# Patient Record
Sex: FEMALE | Race: WHITE | Employment: FULL TIME | URBAN - METROPOLITAN AREA
[De-identification: names, ages, dates, MRNs, and addresses within clinical notes are randomized per-mention and may not be internally consistent; named-entity substitution may affect disease eponyms.]

---

## 2018-04-10 ENCOUNTER — HOSPITAL ENCOUNTER (EMERGENCY)
Age: 43
Discharge: HOME | End: 2018-04-10
Attending: EMERGENCY MEDICINE
Payer: COMMERCIAL

## 2018-04-10 VITALS
DIASTOLIC BLOOD PRESSURE: 84 MMHG | BODY MASS INDEX: 34.46 KG/M2 | WEIGHT: 220 LBS | OXYGEN SATURATION: 99 % | TEMPERATURE: 98.3 F | HEART RATE: 106 BPM | SYSTOLIC BLOOD PRESSURE: 120 MMHG | RESPIRATION RATE: 16 BRPM

## 2018-04-10 DIAGNOSIS — L03.211 CELLULITIS OF FACE: Primary | ICD-10-CM

## 2018-04-10 DIAGNOSIS — H00.012 HORDEOLUM EXTERNUM OF RIGHT LOWER EYELID: ICD-10-CM

## 2018-04-10 PROCEDURE — 99282 EMERGENCY DEPT VISIT SF MDM: CPT

## 2018-04-10 RX ORDER — SULFAMETHOXAZOLE AND TRIMETHOPRIM 800; 160 MG/1; MG/1
2 TABLET ORAL 2 TIMES DAILY
Qty: 40 TABLET | Refills: 0 | Status: SHIPPED | OUTPATIENT
Start: 2018-04-10 | End: 2018-04-20

## 2018-04-10 RX ORDER — CEPHALEXIN 500 MG/1
500 CAPSULE ORAL 4 TIMES DAILY
Qty: 40 CAPSULE | Refills: 0 | Status: SHIPPED | OUTPATIENT
Start: 2018-04-10 | End: 2018-04-20

## 2018-04-10 RX ORDER — GENTAMICIN SULFATE 3 MG/ML
1 SOLUTION/ DROPS OPHTHALMIC EVERY 4 HOURS
Qty: 1 BOTTLE | Refills: 0 | Status: SHIPPED | OUTPATIENT
Start: 2018-04-10 | End: 2018-04-17

## 2018-04-10 ASSESSMENT — PAIN SCALES - GENERAL: PAINLEVEL_OUTOF10: 4

## 2018-04-10 ASSESSMENT — PAIN DESCRIPTION - FREQUENCY: FREQUENCY: CONTINUOUS

## 2018-04-10 ASSESSMENT — PAIN DESCRIPTION - DESCRIPTORS: DESCRIPTORS: SORE

## 2018-04-10 ASSESSMENT — PAIN DESCRIPTION - PAIN TYPE: TYPE: ACUTE PAIN

## 2018-04-10 ASSESSMENT — PAIN DESCRIPTION - PROGRESSION
CLINICAL_PROGRESSION: NOT CHANGED
CLINICAL_PROGRESSION: NOT CHANGED

## 2018-04-12 ENCOUNTER — HOSPITAL ENCOUNTER (EMERGENCY)
Age: 43
Discharge: HOME | End: 2018-04-12
Attending: EMERGENCY MEDICINE
Payer: COMMERCIAL

## 2018-04-12 VITALS
SYSTOLIC BLOOD PRESSURE: 159 MMHG | HEART RATE: 104 BPM | TEMPERATURE: 98.4 F | BODY MASS INDEX: 33.34 KG/M2 | RESPIRATION RATE: 16 BRPM | OXYGEN SATURATION: 98 % | WEIGHT: 220 LBS | HEIGHT: 68 IN | DIASTOLIC BLOOD PRESSURE: 91 MMHG

## 2018-04-12 DIAGNOSIS — L30.9 DERMATITIS: Primary | ICD-10-CM

## 2018-04-12 LAB
ANION GAP SERPL CALCULATED.3IONS-SCNC: 14 MMOL/L (ref 7–16)
BASOPHILS ABSOLUTE: 0.05 E9/L (ref 0–0.2)
BASOPHILS RELATIVE PERCENT: 0.5 % (ref 0–2)
BUN BLDV-MCNC: 14 MG/DL (ref 6–20)
CALCIUM SERPL-MCNC: 9.6 MG/DL (ref 8.6–10.2)
CHLORIDE BLD-SCNC: 100 MMOL/L (ref 98–107)
CO2: 27 MMOL/L (ref 22–29)
CREAT SERPL-MCNC: 0.9 MG/DL (ref 0.5–1)
EOSINOPHILS ABSOLUTE: 0.08 E9/L (ref 0.05–0.5)
EOSINOPHILS RELATIVE PERCENT: 0.8 % (ref 0–6)
GFR AFRICAN AMERICAN: >60
GFR NON-AFRICAN AMERICAN: >60 ML/MIN/1.73
GLUCOSE BLD-MCNC: 176 MG/DL (ref 74–109)
HCT VFR BLD CALC: 43.1 % (ref 34–48)
HEMOGLOBIN: 14.2 G/DL (ref 11.5–15.5)
IMMATURE GRANULOCYTES #: 0.05 E9/L
IMMATURE GRANULOCYTES %: 0.5 % (ref 0–5)
LYMPHOCYTES ABSOLUTE: 3.03 E9/L (ref 1.5–4)
LYMPHOCYTES RELATIVE PERCENT: 29.9 % (ref 20–42)
MCH RBC QN AUTO: 28.1 PG (ref 26–35)
MCHC RBC AUTO-ENTMCNC: 32.9 % (ref 32–34.5)
MCV RBC AUTO: 85.3 FL (ref 80–99.9)
MONOCYTES ABSOLUTE: 0.5 E9/L (ref 0.1–0.95)
MONOCYTES RELATIVE PERCENT: 4.9 % (ref 2–12)
NEUTROPHILS ABSOLUTE: 6.44 E9/L (ref 1.8–7.3)
NEUTROPHILS RELATIVE PERCENT: 63.4 % (ref 43–80)
PDW BLD-RTO: 13.8 FL (ref 11.5–15)
PLATELET # BLD: 314 E9/L (ref 130–450)
PMV BLD AUTO: 10.2 FL (ref 7–12)
POTASSIUM SERPL-SCNC: 4.6 MMOL/L (ref 3.5–5)
RBC # BLD: 5.05 E12/L (ref 3.5–5.5)
SODIUM BLD-SCNC: 141 MMOL/L (ref 132–146)
WBC # BLD: 10.2 E9/L (ref 4.5–11.5)

## 2018-04-12 PROCEDURE — 36415 COLL VENOUS BLD VENIPUNCTURE: CPT

## 2018-04-12 PROCEDURE — 85025 COMPLETE CBC W/AUTO DIFF WBC: CPT

## 2018-04-12 PROCEDURE — 80048 BASIC METABOLIC PNL TOTAL CA: CPT

## 2018-04-12 PROCEDURE — 99283 EMERGENCY DEPT VISIT LOW MDM: CPT

## 2018-04-12 ASSESSMENT — PAIN DESCRIPTION - FREQUENCY: FREQUENCY: CONTINUOUS

## 2018-04-12 ASSESSMENT — PAIN DESCRIPTION - DESCRIPTORS: DESCRIPTORS: BURNING

## 2018-04-12 ASSESSMENT — PAIN DESCRIPTION - ONSET: ONSET: ON-GOING

## 2018-04-12 ASSESSMENT — PAIN DESCRIPTION - PAIN TYPE: TYPE: ACUTE PAIN

## 2018-04-12 ASSESSMENT — PAIN SCALES - GENERAL: PAINLEVEL_OUTOF10: 7

## 2018-04-12 ASSESSMENT — PAIN DESCRIPTION - ORIENTATION: ORIENTATION: RIGHT

## 2018-04-12 ASSESSMENT — PAIN DESCRIPTION - LOCATION: LOCATION: EYE;FACE

## 2018-05-07 ENCOUNTER — HOSPITAL ENCOUNTER (OUTPATIENT)
Dept: GENERAL RADIOLOGY | Age: 43
Discharge: HOME | End: 2018-05-09
Payer: COMMERCIAL

## 2018-05-07 DIAGNOSIS — Z85.3 PERSONAL HISTORY OF MALIGNANT NEOPLASM OF BREAST: ICD-10-CM

## 2018-05-07 PROCEDURE — 77066 DX MAMMO INCL CAD BI: CPT

## 2018-08-30 ENCOUNTER — HOSPITAL ENCOUNTER (OUTPATIENT)
Dept: RADIATION ONCOLOGY | Age: 43
Discharge: HOME | End: 2018-08-30
Payer: COMMERCIAL

## 2018-08-30 VITALS
BODY MASS INDEX: 35.61 KG/M2 | SYSTOLIC BLOOD PRESSURE: 112 MMHG | HEART RATE: 88 BPM | RESPIRATION RATE: 18 BRPM | DIASTOLIC BLOOD PRESSURE: 76 MMHG | WEIGHT: 234.2 LBS | TEMPERATURE: 98.1 F

## 2018-08-30 DIAGNOSIS — C50.511 MALIGNANT NEOPLASM OF LOWER-OUTER QUADRANT OF RIGHT FEMALE BREAST, UNSPECIFIED ESTROGEN RECEPTOR STATUS (HCC): Primary | ICD-10-CM

## 2018-08-30 PROCEDURE — 99212 OFFICE O/P EST SF 10 MIN: CPT

## 2018-08-30 PROCEDURE — 99213 OFFICE O/P EST LOW 20 MIN: CPT | Performed by: NURSE PRACTITIONER

## 2018-08-30 RX ORDER — ERTUGLIFLOZIN 5 MG/1
5 TABLET, FILM COATED ORAL DAILY
COMMUNITY
Start: 2018-08-24 | End: 2022-05-12

## 2018-08-30 NOTE — PROGRESS NOTES
Radiation Oncology   Follow Up Note        8/30/2018    Itz Morel  Vitals:    08/30/18 1004   BP: 112/76   Pulse: 88   Resp: 18   Temp: 98.1 °F (36.7 °C)     Wt Readings from Last 1 Encounters:   08/30/18 234 lb 3.2 oz (106.2 kg)         Gianluca Orantes MD,      CC: Patient is here for follow up for post-radiation completion. HPI:  Itz Morel is a pleasant 38 y.o. female with a diagnosis of invasive adenocarcinoma right breast, status post conservative surgery, chemotherapy, and adjuvant RT. Stage TII Na M0 (2B) ER positive [place] negative HER-2 negative. The patient underwent a course of radiation therapy to the right supraclavicular fossa, the right axilla and also the right breast, which was completed on 9/15/16. She completed 6040 cGy in 33 fractions. States that she is doing well. Notices improved fatigue. Sometimes she notes swelling to her right upper chest wall to which she applies ice and the swelling goes down. Denies redness, pain, or warmth. This happens after using her arms a lot as well. Denies any new lumps, bumps or discharge from the nipple. States that she does do monthly self breast exams. Patient is following with Dr Sahil Carrasco for medical oncology. Last seen in office in April 2018. Tolerating Tamoxifen well but notes increased weight gain. She has tried dieting and exercise with no results. Last mammogram on 5/7/18, see results below. Next appt tomorrow. Plans to discuss weight gain with Dr Sahil Carrasco at this time as well.     Past Medical History:   Diagnosis Date    Breast cancer ([address]     Cancer ([facility]     Breast    Diabetes mellitus ([facility]     Headache(784.0)     migraines    Hyperlipidemia     Hypertension     Lumbar herniated disc 2012    x3    Migraine          Past Surgical History:   Procedure Laterality Date    ANKLE SURGERY      BREAST BIOPSY      BREAST LUMPECTOMY      CHOLECYSTECTOMY      HYSTERECTOMY      LEEP      4 PROCEDURES    TIBIA FRACTURE SURGERY SYRINGE 0.5CC/31GX5/16 31G X 5/16\" 0.5 ML MISC   0    glucose blood VI test strips (ONE TOUCH ULTRA TEST) strip Check blood sugar 4 times a day 150 each 12    Blood Glucose Monitoring Suppl (ONE TOUCH ULTRA MINI) W/DEVICE KIT Check blood sugar bid 1 kit 0    ONE TOUCH LANCETS MISC Check blood sugar bid 100 each 12    naproxen (NAPROSYN) 500 MG tablet Take 1 tablet by mouth 2 times daily for 7 days 14 tablet 0     No current facility-administered medications for this encounter. REVIEW OF SYSTEMS:      Constitutional:  No fever chills or rigors. Eyes: No changes in vision, discharge, or pain  ENT: No Headaches, hearing loss or vertigo. No mouth sores or sore throat. No change in taste or smell. Cardiovascular: No chest discomfort, dyspnea on exertion, palpitations or loss of consciousness or phlebitis. Respiratory: Has no cough or wheezing, Has no sputum production. Has no hemoptysis, has no pleuritic pain. Gastrointestinal: No abdominal pain, appetite loss, blood in stools. No change in bowel habits. No hematemesis   Genitourinary: Patient acknowledges no dysuria, trouble voiding, or hematuria. No nocturia or increased frequency. Musculoskeletal: No gait disturbance, weakness or joint complaints. Integumentary: No rash or pruritis. Neurological: No headache, diplopia, change in muscle strength, numbness or tingling. No change in gait, balance, coordination, mood, affect, memory, mentation, behavior. Psychiatric: No anxiety, or depression. Endocrine: No temperature intolerance. No excessive thirst, fluid intake, or urination. No tremor. Hematologic/Lymphatic: No abnormal bruising or bleeding, blood clots or swollen lymph nodes. Allergic/Immunologic: No nasal congestion or hives.             PHYSICAL EXAMINATION:   Vitals:    08/30/18 1004   BP: 112/76   Pulse: 88   Resp: 18   Temp: 98.1 °F (36.7 °C)   TempSrc: Oral   Weight: 234 lb 3.2 oz (106.2 kg)     Constitutional: A well developed, well nourished 38 y.o. female who is alert, oriented, cooperative and in no apparent distress. Head was normocephalic and atraumatic. EENT: EOMI REJI. MMM. No icterus. External canals are patent and no discharge was appreciated. Septum was midline, mucosa was without erythema, exudates or cobblestoning. No thrush was noted. Neck: Supple without thyromegaly. Trachea was midline. No carotid bruits. No stridor or subglottic wheeze. Respiratory: Chest expansion was symmetrical. Breath sounds bilaterally were clear to auscultation. No wheezes, rhonchi or rales. No intercostal retraction or use of accessory muscles. Cardiovascular: Regular without murmur, clicks, gallops or rubs. Abdomen: Obese, rounded and soft without organomegaly. No rebound, guarding or  rigidity. Lymphatic: No lymphadenopathy. Musculoskeletal: Ambulates without assistance. Normal curvature of the spine. No gross muscle weakness. Muscle size, tone and strength are normal. No involuntary movements. Extremities:  No lower extremity edema, ulcerations, tenderness, varicosities or erythema. Coordination appears adequate. Sensory function appears intact. Skin:  Warm and dry. Examination of color, turgor and pigmentation was relatively normal. No bruises or skin rashes. Old surgical scars are noted. Neurological/Psychiatric: General behavior, level of consciousness, thought content is normal. The patient's emotional status is normal.  Cranial nerves II-XII are grossly intact. Breasts: Left breast normal without mass, skin or nipple changes or axillary nodes. Surgical scars noted to right breast with scar tissue palpable. Otherwise right breast with no mass, skin or nipple changes or axillary nodes.         IMAGING:    MAMMOGRAM Bilateral, 5/7/18:  Narrative   TISSUE DENSITY:   There are scattered fibroglandular densities (Type 2 density).       MAMMOGRAM FINDINGS:   Finding 1:   There is a post-surgical scar seen in the

## 2018-09-11 ENCOUNTER — TELEPHONE (OUTPATIENT)
Dept: RADIATION ONCOLOGY | Age: 43
End: 2018-09-11

## 2018-09-11 NOTE — TELEPHONE ENCOUNTER
Attempted to contact patient per referral, questions about weight loss. Patient unavailable, voicemail left, encouraged return call as able.  Sky Jerez RD,,LD

## 2018-09-12 ENCOUNTER — HOSPITAL ENCOUNTER (EMERGENCY)
Age: 43
Discharge: HOME | End: 2018-09-12
Attending: EMERGENCY MEDICINE
Payer: COMMERCIAL

## 2018-09-12 VITALS
TEMPERATURE: 98.4 F | HEART RATE: 94 BPM | DIASTOLIC BLOOD PRESSURE: 80 MMHG | SYSTOLIC BLOOD PRESSURE: 123 MMHG | RESPIRATION RATE: 16 BRPM | HEIGHT: 68 IN | WEIGHT: 243 LBS | OXYGEN SATURATION: 100 % | BODY MASS INDEX: 36.83 KG/M2

## 2018-09-12 DIAGNOSIS — J06.9 ACUTE UPPER RESPIRATORY INFECTION: Primary | ICD-10-CM

## 2018-09-12 DIAGNOSIS — R05.9 COUGH: ICD-10-CM

## 2018-09-12 DIAGNOSIS — S29.019A THORACIC MYOFASCIAL STRAIN, INITIAL ENCOUNTER: ICD-10-CM

## 2018-09-12 PROCEDURE — 99282 EMERGENCY DEPT VISIT SF MDM: CPT

## 2018-09-12 PROCEDURE — G0381 LEV 2 HOSP TYPE B ED VISIT: HCPCS

## 2018-09-12 RX ORDER — IBUPROFEN 800 MG/1
800 TABLET ORAL EVERY 8 HOURS PRN
Qty: 21 TABLET | Refills: 0 | Status: SHIPPED | OUTPATIENT
Start: 2018-09-12 | End: 2018-10-18 | Stop reason: ALTCHOICE

## 2018-09-12 RX ORDER — BROMPHENIRAMINE MALEATE, PSEUDOEPHEDRINE HYDROCHLORIDE, AND DEXTROMETHORPHAN HYDROBROMIDE 2; 30; 10 MG/5ML; MG/5ML; MG/5ML
5 SYRUP ORAL 4 TIMES DAILY PRN
Qty: 120 ML | Refills: 1 | Status: SHIPPED | OUTPATIENT
Start: 2018-09-12 | End: 2018-09-22

## 2018-09-12 ASSESSMENT — PAIN DESCRIPTION - DESCRIPTORS: DESCRIPTORS: RADIATING

## 2018-09-12 ASSESSMENT — PAIN DESCRIPTION - ORIENTATION: ORIENTATION: LOWER

## 2018-09-12 ASSESSMENT — PAIN SCALES - GENERAL: PAINLEVEL_OUTOF10: 6

## 2018-09-12 ASSESSMENT — PAIN DESCRIPTION - PAIN TYPE: TYPE: ACUTE PAIN

## 2018-09-12 ASSESSMENT — ENCOUNTER SYMPTOMS
GASTROINTESTINAL NEGATIVE: 1
RHINORRHEA: 1
COUGH: 1
BACK PAIN: 1
EYES NEGATIVE: 1

## 2018-09-12 ASSESSMENT — PAIN DESCRIPTION - LOCATION: LOCATION: BACK

## 2018-09-12 NOTE — ED PROVIDER NOTES
Cough, cold symptoms, threw mid back out coughing so hard            Review of Systems   Constitutional: Positive for activity change. HENT: Positive for congestion and rhinorrhea. Eyes: Negative. Respiratory: Positive for cough. Cardiovascular: Negative. Gastrointestinal: Negative. Endocrine: Negative. Genitourinary: Negative. Musculoskeletal: Positive for back pain. Skin: Negative. Neurological: Negative. Hematological: Negative. Psychiatric/Behavioral: Negative. All other systems reviewed and are negative. Physical Exam   Constitutional: She is oriented to person, place, and time. She appears well-developed and well-nourished. HENT:   Head: Normocephalic. Right Ear: A middle ear effusion is present. Left Ear: A middle ear effusion is present. Nose: Mucosal edema and rhinorrhea present. Eyes: Pupils are equal, round, and reactive to light. Neck: Normal range of motion. Cardiovascular: Normal rate and regular rhythm. Pulmonary/Chest: Effort normal.   Abdominal: Soft. Musculoskeletal: Normal range of motion. Neurological: She is alert and oriented to person, place, and time. Skin: Skin is warm. Nursing note and vitals reviewed. --------------------------------------------- PAST HISTORY ---------------------------------------------  Past Medical History:  has a past medical history of Breast cancer ([facility] Diabetes mellitus ([facility] Headache(784.0); Hyperlipidemia; Hypertension; Lumbar herniated disc; and Migraine. Past Surgical History:  has a past surgical history that includes Cholecystectomy; Tubal ligation; LEEP; Tonsillectomy; Tibia fracture surgery; Ankle surgery; Breast biopsy; Breast lumpectomy; and Hysterectomy. Social History:  reports that she quit smoking about 3 years ago. She smoked 0.00 packs per day for 0.00 years.  She has never used smokeless tobacco. She reports that she does not drink alcohol or use hours as needed for Pain   Previous Medications    B-D INS SYRINGE 0.5CC/31GX5/16 31G X 5/16\" 0.5 ML MISC        BLOOD GLUCOSE MONITORING SUPPL (FREESTYLE LITE) BIRDIE        BLOOD GLUCOSE MONITORING SUPPL (ONE TOUCH ULTRA MINI) W/DEVICE KIT    Check blood sugar bid    GLUCOSE BLOOD VI TEST STRIPS (ONE TOUCH ULTRA TEST) STRIP    Check blood sugar 4 times a day    INSULIN GLARGINE (BASAGLAR KWIKPEN) 100 UNIT/ML INJECTION PEN    Inject 60 Units into the skin nightly    INSULIN PEN NEEDLE 32G X 6 MM MISC    1 Device by Does not apply route daily    INSULIN SYRINGE .5CC/29G (AIMSCO INSULIN SYR ULTRA THIN) 29G X 1/2\" 0.5 ML MISC    Use as directed    METFORMIN (GLUCOPHAGE) 500 MG TABLET    Take 1 tablet by mouth 2 times daily (with meals)    NOVOLOG 100 UNIT/ML INJECTION VIAL    inject 10 units subcutaneously three times a day if needed    OMEPRAZOLE (PRILOSEC) 20 MG DELAYED RELEASE CAPSULE    Take 20 mg by mouth daily    ONE TOUCH LANCETS MISC    Check blood sugar bid    STEGLATRO 5 MG TABS    Take 10 mg by mouth daily    TAMOXIFEN (NOLVADEX) 10 MG TABLET    Take 20 mg by mouth daily   Modified Medications    No medications on file   Discontinued Medications    NAPROXEN (NAPROSYN) 500 MG TABLET    Take 1 tablet by mouth 2 times daily for 7 days         Procedures    BISI Mcdonough DO  09/12/18 3098

## 2018-09-25 ENCOUNTER — TELEPHONE (OUTPATIENT)
Dept: RADIATION ONCOLOGY | Age: 43
End: 2018-09-25

## 2018-10-18 ENCOUNTER — HOSPITAL ENCOUNTER (EMERGENCY)
Age: 43
Discharge: HOME | End: 2018-10-18
Attending: EMERGENCY MEDICINE
Payer: COMMERCIAL

## 2018-10-18 ENCOUNTER — APPOINTMENT (OUTPATIENT)
Dept: CT IMAGING | Age: 43
End: 2018-10-18
Payer: COMMERCIAL

## 2018-10-18 VITALS
TEMPERATURE: 98.4 F | DIASTOLIC BLOOD PRESSURE: 76 MMHG | RESPIRATION RATE: 14 BRPM | SYSTOLIC BLOOD PRESSURE: 143 MMHG | OXYGEN SATURATION: 98 % | HEIGHT: 68 IN | HEART RATE: 84 BPM | BODY MASS INDEX: 30.31 KG/M2 | WEIGHT: 200 LBS

## 2018-10-18 DIAGNOSIS — R59.1 LYMPHADENOPATHY: ICD-10-CM

## 2018-10-18 DIAGNOSIS — R07.9 CHEST PAIN, UNSPECIFIED TYPE: Primary | ICD-10-CM

## 2018-10-18 LAB
ANION GAP SERPL CALCULATED.3IONS-SCNC: 14 MMOL/L (ref 7–16)
BUN BLDV-MCNC: 14 MG/DL (ref 6–20)
CALCIUM SERPL-MCNC: 9.1 MG/DL (ref 8.6–10.2)
CHLORIDE BLD-SCNC: 100 MMOL/L (ref 98–107)
CHP ED QC CHECK: YES
CO2: 23 MMOL/L (ref 22–29)
CREAT SERPL-MCNC: 0.6 MG/DL (ref 0.5–1)
EKG ATRIAL RATE: 79 BPM
EKG P AXIS: 47 DEGREES
EKG P-R INTERVAL: 140 MS
EKG Q-T INTERVAL: 378 MS
EKG QRS DURATION: 90 MS
EKG QTC CALCULATION (BAZETT): 433 MS
EKG R AXIS: 47 DEGREES
EKG T AXIS: 32 DEGREES
EKG VENTRICULAR RATE: 79 BPM
GFR AFRICAN AMERICAN: >60
GFR NON-AFRICAN AMERICAN: >60 ML/MIN/1.73
GLUCOSE BLD-MCNC: 177 MG/DL (ref 74–109)
GLUCOSE BLD-MCNC: 182 MG/DL
HCT VFR BLD CALC: 43.2 % (ref 34–48)
HEMOGLOBIN: 14 G/DL (ref 11.5–15.5)
MCH RBC QN AUTO: 27.4 PG (ref 26–35)
MCHC RBC AUTO-ENTMCNC: 32.4 % (ref 32–34.5)
MCV RBC AUTO: 84.5 FL (ref 80–99.9)
PDW BLD-RTO: 13.2 FL (ref 11.5–15)
PLATELET # BLD: 289 E9/L (ref 130–450)
PMV BLD AUTO: 10.8 FL (ref 7–12)
POC ANION GAP: 19
POC BUN: 14
POC CHLORIDE: 102
POC CO2: 23
POC CREATININE: 0.6
POC POTASSIUM: 4
POC SODIUM: 139
POTASSIUM SERPL-SCNC: 4.1 MMOL/L (ref 3.5–5)
RBC # BLD: 5.11 E12/L (ref 3.5–5.5)
SODIUM BLD-SCNC: 137 MMOL/L (ref 132–146)
TROPONIN: <0.01 NG/ML (ref 0–0.03)
WBC # BLD: 9.4 E9/L (ref 4.5–11.5)

## 2018-10-18 PROCEDURE — 85027 COMPLETE CBC AUTOMATED: CPT

## 2018-10-18 PROCEDURE — 84484 ASSAY OF TROPONIN QUANT: CPT

## 2018-10-18 PROCEDURE — 99285 EMERGENCY DEPT VISIT HI MDM: CPT

## 2018-10-18 PROCEDURE — 93005 ELECTROCARDIOGRAM TRACING: CPT

## 2018-10-18 PROCEDURE — 36415 COLL VENOUS BLD VENIPUNCTURE: CPT

## 2018-10-18 PROCEDURE — 80048 BASIC METABOLIC PNL TOTAL CA: CPT

## 2018-10-18 PROCEDURE — 6360000004 HC RX CONTRAST MEDICATION: Performed by: RADIOLOGY

## 2018-10-18 PROCEDURE — 71275 CT ANGIOGRAPHY CHEST: CPT

## 2018-10-18 PROCEDURE — 6370000000 HC RX 637 (ALT 250 FOR IP): Performed by: EMERGENCY MEDICINE

## 2018-10-18 RX ORDER — ATORVASTATIN CALCIUM 20 MG/1
20 TABLET, FILM COATED ORAL DAILY
COMMUNITY
End: 2020-11-16

## 2018-10-18 RX ORDER — TAMOXIFEN CITRATE 20 MG/1
20 TABLET ORAL NIGHTLY
COMMUNITY

## 2018-10-18 RX ORDER — ASPIRIN 81 MG/1
324 TABLET, CHEWABLE ORAL ONCE
Status: COMPLETED | OUTPATIENT
Start: 2018-10-18 | End: 2018-10-18

## 2018-10-18 RX ADMIN — IOPAMIDOL 80 ML: 755 INJECTION, SOLUTION INTRAVENOUS at 11:01

## 2018-10-18 RX ADMIN — ASPIRIN 81 MG CHEWABLE TABLET 324 MG: 81 TABLET CHEWABLE at 10:06

## 2018-10-18 ASSESSMENT — PAIN DESCRIPTION - PAIN TYPE: TYPE: ACUTE PAIN

## 2018-10-18 ASSESSMENT — PAIN SCALES - GENERAL: PAINLEVEL_OUTOF10: 6

## 2018-10-18 ASSESSMENT — PAIN DESCRIPTION - LOCATION: LOCATION: CHEST

## 2018-10-18 ASSESSMENT — PAIN DESCRIPTION - FREQUENCY: FREQUENCY: INTERMITTENT

## 2018-10-18 ASSESSMENT — ENCOUNTER SYMPTOMS
ABDOMINAL PAIN: 0
VOMITING: 0
SHORTNESS OF BREATH: 1
NAUSEA: 0

## 2018-10-18 ASSESSMENT — PAIN DESCRIPTION - DESCRIPTORS: DESCRIPTORS: STABBING

## 2018-10-18 ASSESSMENT — PAIN DESCRIPTION - ORIENTATION: ORIENTATION: LEFT

## 2018-10-18 NOTE — ED NOTES
Pt clothes changed and gown placed  Cardiac monitoring and continuous SpO2       Inessa Dupont  10/18/18 [id number]

## 2018-10-22 ENCOUNTER — HOSPITAL ENCOUNTER (OUTPATIENT)
Dept: NUCLEAR MEDICINE | Age: 43
Discharge: HOME | End: 2018-10-24
Payer: COMMERCIAL

## 2018-10-22 ENCOUNTER — HOSPITAL ENCOUNTER (OUTPATIENT)
Dept: PULMONOLOGY | Age: 43
Discharge: HOME | End: 2018-10-22
Payer: COMMERCIAL

## 2018-10-22 DIAGNOSIS — R06.02 BREATH SHORTNESS: ICD-10-CM

## 2018-10-22 PROCEDURE — 3430000000 HC RX DIAGNOSTIC RADIOPHARMACEUTICAL: Performed by: RADIOLOGY

## 2018-10-22 PROCEDURE — A9540 TC99M MAA: HCPCS | Performed by: RADIOLOGY

## 2018-10-22 PROCEDURE — 78582 LUNG VENTILAT&PERFUS IMAGING: CPT

## 2018-10-22 PROCEDURE — A9558 XE133 XENON 10MCI: HCPCS | Performed by: RADIOLOGY

## 2018-10-22 RX ORDER — XENON XE-133 10 MCI/1
26.5 GAS RESPIRATORY (INHALATION)
Status: COMPLETED | OUTPATIENT
Start: 2018-10-22 | End: 2018-10-22

## 2018-10-22 RX ADMIN — XENON XE-133 26.5 MILLICURIE: 10 GAS RESPIRATORY (INHALATION) at 14:55

## 2018-10-22 RX ADMIN — Medication 7.6 MILLICURIE: at 15:05

## 2019-01-07 ENCOUNTER — HOSPITAL ENCOUNTER (EMERGENCY)
Age: 44
Discharge: HOME | End: 2019-01-07
Attending: EMERGENCY MEDICINE
Payer: COMMERCIAL

## 2019-01-07 VITALS
OXYGEN SATURATION: 98 % | HEART RATE: 98 BPM | TEMPERATURE: 98.7 F | RESPIRATION RATE: 16 BRPM | BODY MASS INDEX: 34.53 KG/M2 | HEIGHT: 67 IN | WEIGHT: 220 LBS | DIASTOLIC BLOOD PRESSURE: 91 MMHG | SYSTOLIC BLOOD PRESSURE: 134 MMHG

## 2019-01-07 DIAGNOSIS — J01.90 ACUTE SINUSITIS, RECURRENCE NOT SPECIFIED, UNSPECIFIED LOCATION: Primary | ICD-10-CM

## 2019-01-07 PROCEDURE — G0381 LEV 2 HOSP TYPE B ED VISIT: HCPCS

## 2019-01-07 PROCEDURE — 99282 EMERGENCY DEPT VISIT SF MDM: CPT

## 2019-01-07 RX ORDER — BROMPHENIRAMINE MALEATE, PSEUDOEPHEDRINE HYDROCHLORIDE, AND DEXTROMETHORPHAN HYDROBROMIDE 2; 30; 10 MG/5ML; MG/5ML; MG/5ML
5 SYRUP ORAL 4 TIMES DAILY PRN
Qty: 120 ML | Refills: 0 | Status: SHIPPED | OUTPATIENT
Start: 2019-01-07 | End: 2019-03-08

## 2019-01-07 RX ORDER — AMOXICILLIN AND CLAVULANATE POTASSIUM 875; 125 MG/1; MG/1
1 TABLET, FILM COATED ORAL 2 TIMES DAILY
Qty: 20 TABLET | Refills: 0 | Status: SHIPPED | OUTPATIENT
Start: 2019-01-07 | End: 2019-01-17

## 2019-01-07 ASSESSMENT — ENCOUNTER SYMPTOMS
EYE PAIN: 0
ABDOMINAL DISTENTION: 0
SHORTNESS OF BREATH: 0
SORE THROAT: 0
BACK PAIN: 0
WHEEZING: 0
EYE DISCHARGE: 0
VOMITING: 0
RHINORRHEA: 1
SINUS PRESSURE: 0
NAUSEA: 0
EYE REDNESS: 0
COUGH: 1
DIARRHEA: 0

## 2019-01-16 ENCOUNTER — HOSPITAL ENCOUNTER (OUTPATIENT)
Dept: ULTRASOUND IMAGING | Age: 44
End: 2019-01-16
Payer: COMMERCIAL

## 2019-01-16 ENCOUNTER — HOSPITAL ENCOUNTER (OUTPATIENT)
Dept: MAMMOGRAPHY | Age: 44
Discharge: HOME | End: 2019-01-18
Payer: COMMERCIAL

## 2019-01-16 DIAGNOSIS — Z09 FOLLOW-UP EXAM, 3-6 MONTHS SINCE PREVIOUS EXAM: ICD-10-CM

## 2019-01-16 PROCEDURE — G0279 TOMOSYNTHESIS, MAMMO: HCPCS

## 2019-03-08 ENCOUNTER — HOSPITAL ENCOUNTER (EMERGENCY)
Age: 44
Discharge: HOME | End: 2019-03-08
Attending: EMERGENCY MEDICINE
Payer: COMMERCIAL

## 2019-03-08 VITALS
SYSTOLIC BLOOD PRESSURE: 129 MMHG | RESPIRATION RATE: 14 BRPM | DIASTOLIC BLOOD PRESSURE: 86 MMHG | BODY MASS INDEX: 31.39 KG/M2 | TEMPERATURE: 98.1 F | WEIGHT: 200 LBS | OXYGEN SATURATION: 98 % | HEART RATE: 104 BPM | HEIGHT: 67 IN

## 2019-03-08 DIAGNOSIS — J01.00 ACUTE MAXILLARY SINUSITIS, RECURRENCE NOT SPECIFIED: ICD-10-CM

## 2019-03-08 DIAGNOSIS — J02.9 ACUTE PHARYNGITIS, UNSPECIFIED ETIOLOGY: Primary | ICD-10-CM

## 2019-03-08 PROCEDURE — 99282 EMERGENCY DEPT VISIT SF MDM: CPT

## 2019-03-08 RX ORDER — AZITHROMYCIN 250 MG/1
TABLET, FILM COATED ORAL
Qty: 1 PACKET | Refills: 0 | Status: SHIPPED | OUTPATIENT
Start: 2019-03-08 | End: 2019-03-18

## 2019-03-08 RX ORDER — ECHINACEA PURPUREA EXTRACT 125 MG
1 TABLET ORAL PRN
Qty: 1 BOTTLE | Refills: 3 | Status: SHIPPED | OUTPATIENT
Start: 2019-03-08 | End: 2021-07-23

## 2019-03-08 ASSESSMENT — ENCOUNTER SYMPTOMS
ALLERGIC/IMMUNOLOGIC NEGATIVE: 1
RESPIRATORY NEGATIVE: 1
GASTROINTESTINAL NEGATIVE: 1
EYES NEGATIVE: 1
SORE THROAT: 1

## 2019-03-08 ASSESSMENT — PAIN SCALES - GENERAL: PAINLEVEL_OUTOF10: 8

## 2019-03-08 ASSESSMENT — PAIN DESCRIPTION - DESCRIPTORS: DESCRIPTORS: SORE

## 2019-03-08 ASSESSMENT — PAIN DESCRIPTION - LOCATION: LOCATION: THROAT

## 2019-05-28 ENCOUNTER — HOSPITAL ENCOUNTER (OUTPATIENT)
Dept: MAMMOGRAPHY | Age: 44
Discharge: HOME | End: 2019-05-30
Payer: COMMERCIAL

## 2019-05-28 ENCOUNTER — HOSPITAL ENCOUNTER (OUTPATIENT)
Dept: ULTRASOUND IMAGING | Age: 44
Discharge: HOME | End: 2019-05-28
Payer: COMMERCIAL

## 2019-05-28 DIAGNOSIS — R92.2 BREAST DENSITY: ICD-10-CM

## 2019-05-28 DIAGNOSIS — C50.511 MALIGNANT NEOPLASM OF LOWER-OUTER QUADRANT OF RIGHT FEMALE BREAST, UNSPECIFIED ESTROGEN RECEPTOR STATUS (HCC): ICD-10-CM

## 2019-05-28 PROCEDURE — 76642 ULTRASOUND BREAST LIMITED: CPT

## 2019-05-28 PROCEDURE — G0279 TOMOSYNTHESIS, MAMMO: HCPCS

## 2019-05-28 PROCEDURE — 77063 BREAST TOMOSYNTHESIS BI: CPT

## 2019-05-28 PROCEDURE — 77066 DX MAMMO INCL CAD BI: CPT

## 2019-08-23 ENCOUNTER — HOSPITAL ENCOUNTER (OUTPATIENT)
Age: 44
Discharge: HOME | End: 2019-08-23
Payer: COMMERCIAL

## 2019-08-23 ENCOUNTER — HOSPITAL ENCOUNTER (OUTPATIENT)
Dept: CT IMAGING | Age: 44
Discharge: HOME | End: 2019-08-23
Payer: COMMERCIAL

## 2019-08-23 DIAGNOSIS — C50.511 MALIGNANT NEOPLASM OF LOWER-OUTER QUADRANT OF RIGHT FEMALE BREAST, UNSPECIFIED ESTROGEN RECEPTOR STATUS (HCC): ICD-10-CM

## 2019-08-23 LAB
ALBUMIN SERPL-MCNC: 3.8 G/DL (ref 3.5–5.2)
ALP BLD-CCNC: 142 U/L (ref 35–104)
ALT SERPL-CCNC: 47 U/L (ref 0–32)
ANION GAP SERPL CALCULATED.3IONS-SCNC: 11 MMOL/L (ref 7–16)
AST SERPL-CCNC: 43 U/L (ref 0–31)
BASOPHILS ABSOLUTE: 0.05 E9/L (ref 0–0.2)
BASOPHILS RELATIVE PERCENT: 0.8 % (ref 0–2)
BILIRUB SERPL-MCNC: <0.2 MG/DL (ref 0–1.2)
BILIRUBIN URINE: NEGATIVE
BLOOD, URINE: NEGATIVE
BUN BLDV-MCNC: 16 MG/DL (ref 6–20)
CALCIUM SERPL-MCNC: 9 MG/DL (ref 8.6–10.2)
CHLORIDE BLD-SCNC: 103 MMOL/L (ref 98–107)
CHOLESTEROL, TOTAL: 197 MG/DL (ref 0–199)
CLARITY: CLEAR
CO2: 27 MMOL/L (ref 22–29)
COLOR: YELLOW
CREAT SERPL-MCNC: 0.7 MG/DL (ref 0.5–1)
CREATININE URINE: 64 MG/DL (ref 29–226)
EOSINOPHILS ABSOLUTE: 0.13 E9/L (ref 0.05–0.5)
EOSINOPHILS RELATIVE PERCENT: 2 % (ref 0–6)
GFR AFRICAN AMERICAN: >60
GFR NON-AFRICAN AMERICAN: >60 ML/MIN/1.73
GLUCOSE BLD-MCNC: 255 MG/DL (ref 74–99)
GLUCOSE URINE: 250 MG/DL
HBA1C MFR BLD: 7.3 % (ref 4–5.6)
HCT VFR BLD CALC: 43.4 % (ref 34–48)
HDLC SERPL-MCNC: 36 MG/DL
HEMOGLOBIN: 13.8 G/DL (ref 11.5–15.5)
IMMATURE GRANULOCYTES #: 0.04 E9/L
IMMATURE GRANULOCYTES %: 0.6 % (ref 0–5)
KETONES, URINE: NEGATIVE MG/DL
LDL CHOLESTEROL CALCULATED: 128 MG/DL (ref 0–99)
LEUKOCYTE ESTERASE, URINE: NEGATIVE
LYMPHOCYTES ABSOLUTE: 2.09 E9/L (ref 1.5–4)
LYMPHOCYTES RELATIVE PERCENT: 32.4 % (ref 20–42)
MCH RBC QN AUTO: 27.8 PG (ref 26–35)
MCHC RBC AUTO-ENTMCNC: 31.8 % (ref 32–34.5)
MCV RBC AUTO: 87.3 FL (ref 80–99.9)
MICROALBUMIN UR-MCNC: <12 MG/L
MICROALBUMIN/CREAT UR-RTO: ABNORMAL (ref 0–30)
MONOCYTES ABSOLUTE: 0.45 E9/L (ref 0.1–0.95)
MONOCYTES RELATIVE PERCENT: 7 % (ref 2–12)
NEUTROPHILS ABSOLUTE: 3.69 E9/L (ref 1.8–7.3)
NEUTROPHILS RELATIVE PERCENT: 57.2 % (ref 43–80)
NITRITE, URINE: NEGATIVE
PDW BLD-RTO: 13.3 FL (ref 11.5–15)
PH UA: 5 (ref 5–9)
PLATELET # BLD: 258 E9/L (ref 130–450)
PMV BLD AUTO: 10.5 FL (ref 7–12)
POTASSIUM SERPL-SCNC: 4.4 MMOL/L (ref 3.5–5)
PROTEIN UA: NEGATIVE MG/DL
RBC # BLD: 4.97 E12/L (ref 3.5–5.5)
SODIUM BLD-SCNC: 141 MMOL/L (ref 132–146)
SPECIFIC GRAVITY UA: 1.02 (ref 1–1.03)
TOTAL PROTEIN: 7 G/DL (ref 6.4–8.3)
TRIGL SERPL-MCNC: 167 MG/DL (ref 0–149)
TSH SERPL DL<=0.05 MIU/L-ACNC: 2.27 UIU/ML (ref 0.27–4.2)
UROBILINOGEN, URINE: 0.2 E.U./DL
VITAMIN B-12: 413 PG/ML (ref 211–946)
VITAMIN D 25-HYDROXY: 37 NG/ML (ref 30–100)
VLDLC SERPL CALC-MCNC: 33 MG/DL
WBC # BLD: 6.5 E9/L (ref 4.5–11.5)

## 2019-08-23 PROCEDURE — 85025 COMPLETE CBC W/AUTO DIFF WBC: CPT

## 2019-08-23 PROCEDURE — 6360000004 HC RX CONTRAST MEDICATION: Performed by: RADIOLOGY

## 2019-08-23 PROCEDURE — 80061 LIPID PANEL: CPT

## 2019-08-23 PROCEDURE — 84443 ASSAY THYROID STIM HORMONE: CPT

## 2019-08-23 PROCEDURE — 80053 COMPREHEN METABOLIC PANEL: CPT

## 2019-08-23 PROCEDURE — 81003 URINALYSIS AUTO W/O SCOPE: CPT

## 2019-08-23 PROCEDURE — 83036 HEMOGLOBIN GLYCOSYLATED A1C: CPT

## 2019-08-23 PROCEDURE — 36415 COLL VENOUS BLD VENIPUNCTURE: CPT

## 2019-08-23 PROCEDURE — 82607 VITAMIN B-12: CPT

## 2019-08-23 PROCEDURE — 82306 VITAMIN D 25 HYDROXY: CPT

## 2019-08-23 PROCEDURE — 82570 ASSAY OF URINE CREATININE: CPT

## 2019-08-23 PROCEDURE — 82044 UR ALBUMIN SEMIQUANTITATIVE: CPT

## 2019-08-23 PROCEDURE — 74177 CT ABD & PELVIS W/CONTRAST: CPT

## 2019-08-23 PROCEDURE — 71260 CT THORAX DX C+: CPT

## 2019-08-23 RX ADMIN — IOPAMIDOL 80 ML: 755 INJECTION, SOLUTION INTRAVENOUS at 07:05

## 2019-08-23 RX ADMIN — IOHEXOL 50 ML: 240 INJECTION, SOLUTION INTRATHECAL; INTRAVASCULAR; INTRAVENOUS; ORAL at 07:05

## 2019-08-30 ENCOUNTER — HOSPITAL ENCOUNTER (OUTPATIENT)
Dept: RADIATION ONCOLOGY | Age: 44
Discharge: HOME | End: 2019-08-30
Payer: COMMERCIAL

## 2019-08-30 VITALS
DIASTOLIC BLOOD PRESSURE: 77 MMHG | WEIGHT: 234.4 LBS | TEMPERATURE: 98.4 F | BODY MASS INDEX: 36.71 KG/M2 | RESPIRATION RATE: 18 BRPM | SYSTOLIC BLOOD PRESSURE: 133 MMHG | HEART RATE: 86 BPM

## 2019-08-30 DIAGNOSIS — I89.0 LYMPHEDEMA: ICD-10-CM

## 2019-08-30 DIAGNOSIS — C50.511 MALIGNANT NEOPLASM OF LOWER-OUTER QUADRANT OF RIGHT FEMALE BREAST, UNSPECIFIED ESTROGEN RECEPTOR STATUS (HCC): Primary | ICD-10-CM

## 2019-08-30 PROCEDURE — 99212 OFFICE O/P EST SF 10 MIN: CPT

## 2019-08-30 PROCEDURE — 99212 OFFICE O/P EST SF 10 MIN: CPT | Performed by: RADIOLOGY

## 2019-09-17 ENCOUNTER — TELEPHONE (OUTPATIENT)
Dept: OCCUPATIONAL THERAPY | Age: 44
End: 2019-09-17

## 2019-09-17 NOTE — TELEPHONE ENCOUNTER
Therapist called patient to schedule evaluation. Patient did not answer and therapist left voice message to call therapist at [facility] to schedule evaluation with lymphedema clinic.

## 2019-10-13 ENCOUNTER — APPOINTMENT (OUTPATIENT)
Dept: GENERAL RADIOLOGY | Age: 44
End: 2019-10-13
Payer: COMMERCIAL

## 2019-10-13 ENCOUNTER — HOSPITAL ENCOUNTER (EMERGENCY)
Age: 44
Discharge: HOME | End: 2019-10-13
Payer: COMMERCIAL

## 2019-10-13 VITALS
SYSTOLIC BLOOD PRESSURE: 157 MMHG | OXYGEN SATURATION: 98 % | HEART RATE: 90 BPM | HEIGHT: 67 IN | WEIGHT: 200 LBS | RESPIRATION RATE: 18 BRPM | BODY MASS INDEX: 31.39 KG/M2 | TEMPERATURE: 98.3 F | DIASTOLIC BLOOD PRESSURE: 93 MMHG

## 2019-10-13 DIAGNOSIS — S20.211A CONTUSION OF RIB ON RIGHT SIDE, INITIAL ENCOUNTER: ICD-10-CM

## 2019-10-13 DIAGNOSIS — S63.502A SPRAIN OF LEFT WRIST, INITIAL ENCOUNTER: Primary | ICD-10-CM

## 2019-10-13 PROCEDURE — 99284 EMERGENCY DEPT VISIT MOD MDM: CPT

## 2019-10-13 PROCEDURE — 71046 X-RAY EXAM CHEST 2 VIEWS: CPT

## 2019-10-13 PROCEDURE — 73110 X-RAY EXAM OF WRIST: CPT

## 2019-10-13 RX ORDER — IBUPROFEN 800 MG/1
800 TABLET ORAL EVERY 6 HOURS PRN
Qty: 21 TABLET | Refills: 0 | Status: SHIPPED | OUTPATIENT
Start: 2019-10-13 | End: 2019-12-24 | Stop reason: ALTCHOICE

## 2019-10-13 ASSESSMENT — PAIN SCALES - GENERAL: PAINLEVEL_OUTOF10: 8

## 2019-10-13 ASSESSMENT — PAIN DESCRIPTION - FREQUENCY: FREQUENCY: CONTINUOUS

## 2019-10-13 ASSESSMENT — PAIN DESCRIPTION - PROGRESSION: CLINICAL_PROGRESSION: GRADUALLY WORSENING

## 2019-12-24 ENCOUNTER — HOSPITAL ENCOUNTER (EMERGENCY)
Age: 44
Discharge: HOME | End: 2019-12-24
Attending: EMERGENCY MEDICINE
Payer: COMMERCIAL

## 2019-12-24 VITALS
TEMPERATURE: 98.1 F | DIASTOLIC BLOOD PRESSURE: 94 MMHG | WEIGHT: 240 LBS | HEART RATE: 99 BPM | RESPIRATION RATE: 20 BRPM | OXYGEN SATURATION: 98 % | SYSTOLIC BLOOD PRESSURE: 150 MMHG | BODY MASS INDEX: 37.59 KG/M2

## 2019-12-24 DIAGNOSIS — K08.89 PAIN, DENTAL: Primary | ICD-10-CM

## 2019-12-24 PROCEDURE — G0381 LEV 2 HOSP TYPE B ED VISIT: HCPCS

## 2019-12-24 RX ORDER — TRAMADOL HYDROCHLORIDE 50 MG/1
50 TABLET ORAL EVERY 6 HOURS PRN
Qty: 12 TABLET | Refills: 0 | Status: SHIPPED | OUTPATIENT
Start: 2019-12-24 | End: 2019-12-27

## 2019-12-24 RX ORDER — AMOXICILLIN 500 MG/1
500 CAPSULE ORAL 3 TIMES DAILY
Qty: 30 CAPSULE | Refills: 0 | Status: SHIPPED | OUTPATIENT
Start: 2019-12-24 | End: 2020-01-03

## 2019-12-24 RX ORDER — IBUPROFEN 800 MG/1
800 TABLET ORAL EVERY 8 HOURS PRN
Qty: 30 TABLET | Refills: 0 | Status: SHIPPED | OUTPATIENT
Start: 2019-12-24 | End: 2021-09-06

## 2019-12-24 ASSESSMENT — PAIN DESCRIPTION - ORIENTATION: ORIENTATION: RIGHT;LOWER

## 2019-12-24 ASSESSMENT — ENCOUNTER SYMPTOMS
NAUSEA: 0
EYE PAIN: 0
EYE DISCHARGE: 0
SHORTNESS OF BREATH: 0
WHEEZING: 0
SINUS PRESSURE: 0
COUGH: 0
BACK PAIN: 0
VOMITING: 0
SORE THROAT: 0
EYE REDNESS: 0
ABDOMINAL DISTENTION: 0
DIARRHEA: 0

## 2019-12-24 ASSESSMENT — PAIN DESCRIPTION - DESCRIPTORS: DESCRIPTORS: THROBBING

## 2019-12-24 ASSESSMENT — PAIN DESCRIPTION - ONSET: ONSET: GRADUAL

## 2019-12-24 ASSESSMENT — PAIN DESCRIPTION - LOCATION: LOCATION: TEETH

## 2019-12-24 ASSESSMENT — PAIN - FUNCTIONAL ASSESSMENT: PAIN_FUNCTIONAL_ASSESSMENT: 0-10

## 2019-12-24 ASSESSMENT — PAIN SCALES - GENERAL
PAINLEVEL_OUTOF10: 10
PAINLEVEL_OUTOF10: 10

## 2019-12-24 ASSESSMENT — PAIN DESCRIPTION - PAIN TYPE: TYPE: ACUTE PAIN

## 2019-12-24 ASSESSMENT — PAIN DESCRIPTION - PROGRESSION: CLINICAL_PROGRESSION: GRADUALLY WORSENING

## 2019-12-24 ASSESSMENT — PAIN DESCRIPTION - FREQUENCY: FREQUENCY: CONTINUOUS

## 2020-03-06 ENCOUNTER — HOSPITAL ENCOUNTER (OUTPATIENT)
Dept: MAMMOGRAPHY | Age: 45
Discharge: HOME | End: 2020-03-08
Payer: COMMERCIAL

## 2020-03-06 ENCOUNTER — HOSPITAL ENCOUNTER (OUTPATIENT)
Dept: ULTRASOUND IMAGING | Age: 45
End: 2020-03-06
Payer: COMMERCIAL

## 2020-03-06 PROCEDURE — G0279 TOMOSYNTHESIS, MAMMO: HCPCS

## 2020-09-18 ENCOUNTER — HOSPITAL ENCOUNTER (OUTPATIENT)
Dept: ULTRASOUND IMAGING | Age: 45
End: 2020-09-18
Payer: COMMERCIAL

## 2020-09-18 ENCOUNTER — HOSPITAL ENCOUNTER (OUTPATIENT)
Dept: MAMMOGRAPHY | Age: 45
Discharge: HOME | End: 2020-09-20
Payer: COMMERCIAL

## 2020-09-18 PROCEDURE — G0279 TOMOSYNTHESIS, MAMMO: HCPCS

## 2020-09-21 ENCOUNTER — HOSPITAL ENCOUNTER (EMERGENCY)
Age: 45
Discharge: HOME | End: 2020-09-21
Attending: EMERGENCY MEDICINE
Payer: COMMERCIAL

## 2020-09-21 ENCOUNTER — APPOINTMENT (OUTPATIENT)
Dept: GENERAL RADIOLOGY | Age: 45
End: 2020-09-21
Payer: COMMERCIAL

## 2020-09-21 VITALS
DIASTOLIC BLOOD PRESSURE: 91 MMHG | WEIGHT: 240 LBS | OXYGEN SATURATION: 97 % | HEART RATE: 90 BPM | BODY MASS INDEX: 37.67 KG/M2 | TEMPERATURE: 98.2 F | HEIGHT: 67 IN | SYSTOLIC BLOOD PRESSURE: 157 MMHG | RESPIRATION RATE: 18 BRPM

## 2020-09-21 LAB
ANION GAP SERPL CALCULATED.3IONS-SCNC: 12 MMOL/L (ref 7–16)
BASOPHILS ABSOLUTE: 0.07 E9/L (ref 0–0.2)
BASOPHILS RELATIVE PERCENT: 0.8 % (ref 0–2)
BUN BLDV-MCNC: 13 MG/DL (ref 6–20)
CALCIUM SERPL-MCNC: 9.2 MG/DL (ref 8.6–10.2)
CHLORIDE BLD-SCNC: 97 MMOL/L (ref 98–107)
CO2: 27 MMOL/L (ref 22–29)
CREAT SERPL-MCNC: 0.8 MG/DL (ref 0.5–1)
D DIMER: <200 NG/ML DDU
EOSINOPHILS ABSOLUTE: 0.12 E9/L (ref 0.05–0.5)
EOSINOPHILS RELATIVE PERCENT: 1.4 % (ref 0–6)
GFR AFRICAN AMERICAN: >60
GFR NON-AFRICAN AMERICAN: >60 ML/MIN/1.73
GLUCOSE BLD-MCNC: 282 MG/DL (ref 74–99)
HCT VFR BLD CALC: 41.2 % (ref 34–48)
HEMOGLOBIN: 13.1 G/DL (ref 11.5–15.5)
IMMATURE GRANULOCYTES #: 0.05 E9/L
IMMATURE GRANULOCYTES %: 0.6 % (ref 0–5)
LYMPHOCYTES ABSOLUTE: 2.96 E9/L (ref 1.5–4)
LYMPHOCYTES RELATIVE PERCENT: 33.6 % (ref 20–42)
MCH RBC QN AUTO: 27.3 PG (ref 26–35)
MCHC RBC AUTO-ENTMCNC: 31.8 % (ref 32–34.5)
MCV RBC AUTO: 85.8 FL (ref 80–99.9)
MONOCYTES ABSOLUTE: 0.52 E9/L (ref 0.1–0.95)
MONOCYTES RELATIVE PERCENT: 5.9 % (ref 2–12)
NEUTROPHILS ABSOLUTE: 5.1 E9/L (ref 1.8–7.3)
NEUTROPHILS RELATIVE PERCENT: 57.7 % (ref 43–80)
PDW BLD-RTO: 13.3 FL (ref 11.5–15)
PLATELET # BLD: 272 E9/L (ref 130–450)
PMV BLD AUTO: 10.2 FL (ref 7–12)
POTASSIUM REFLEX MAGNESIUM: 3.7 MMOL/L (ref 3.5–5)
RBC # BLD: 4.8 E12/L (ref 3.5–5.5)
SODIUM BLD-SCNC: 136 MMOL/L (ref 132–146)
TROPONIN: <0.01 NG/ML (ref 0–0.03)
WBC # BLD: 8.8 E9/L (ref 4.5–11.5)

## 2020-09-21 PROCEDURE — 71045 X-RAY EXAM CHEST 1 VIEW: CPT

## 2020-09-21 PROCEDURE — 93005 ELECTROCARDIOGRAM TRACING: CPT | Performed by: EMERGENCY MEDICINE

## 2020-09-21 PROCEDURE — 84484 ASSAY OF TROPONIN QUANT: CPT

## 2020-09-21 PROCEDURE — 99283 EMERGENCY DEPT VISIT LOW MDM: CPT

## 2020-09-21 PROCEDURE — 85378 FIBRIN DEGRADE SEMIQUANT: CPT

## 2020-09-21 PROCEDURE — 99282 EMERGENCY DEPT VISIT SF MDM: CPT

## 2020-09-21 PROCEDURE — 80048 BASIC METABOLIC PNL TOTAL CA: CPT

## 2020-09-21 PROCEDURE — 85025 COMPLETE CBC W/AUTO DIFF WBC: CPT

## 2020-09-21 PROCEDURE — 6370000000 HC RX 637 (ALT 250 FOR IP): Performed by: EMERGENCY MEDICINE

## 2020-09-21 RX ORDER — ASPIRIN 81 MG/1
324 TABLET, CHEWABLE ORAL ONCE
Status: COMPLETED | OUTPATIENT
Start: 2020-09-21 | End: 2020-09-21

## 2020-09-21 RX ADMIN — ASPIRIN 81 MG CHEWABLE TABLET 324 MG: 81 TABLET CHEWABLE at 21:26

## 2020-09-21 ASSESSMENT — ENCOUNTER SYMPTOMS
EYE REDNESS: 0
CONSTIPATION: 0
DIARRHEA: 0
EYE PAIN: 0
BACK PAIN: 0
VOMITING: 0
EYE DISCHARGE: 0
SINUS PRESSURE: 0
RHINORRHEA: 0
ABDOMINAL PAIN: 0
COUGH: 0
SORE THROAT: 0
NAUSEA: 0
WHEEZING: 0
BLOOD IN STOOL: 0
ABDOMINAL DISTENTION: 0
SHORTNESS OF BREATH: 1

## 2020-09-21 ASSESSMENT — PAIN DESCRIPTION - LOCATION: LOCATION: CHEST

## 2020-09-21 ASSESSMENT — PAIN SCALES - GENERAL: PAINLEVEL_OUTOF10: 6

## 2020-09-22 NOTE — ED PROVIDER NOTES
HPI  Lydia Ndiaye is a 45 y.o. female with a PMHx significant for hypertension, hyperlipidemia, migraines, type 2 diabetes mellitus and breast cancer (in remission) who presents with several days of intermittent chest pain that last for seconds at a time. The patient describes the pain as a sharp stabbing sensation that localizes to her mid chest.  She states that at one point she thought she felt it in her left shoulder, but states it has mostly just been in her chest.  She states it is present both at rest and with activity. She states that nothing seems to make it better or worse. She says that she has taken ibuprofen for the discomfort but has not gotten any meaningful relief of symptoms. She also endorses a few episodes of mild intermittent shortness of breath. Patient states she has no history of breathing problems in the past.  The patient denies recent trauma, fever, chills, fatigue, HA, dizziness, vision changes, congestion, rhinorrhea, sore throat, palpitations, hx of MI, hx of blood clots, LE edema, cough, wheezing, abdominal pain, N/V/D/C, hematochezia, melena, dysuria, hematuria, generalized weakness and paresthesias. The patient is currently taking no blood thinners. Tobacco Hx:   reports that she quit smoking about 5 years ago. She smoked 0.00 packs per day for 0.00 years. She has never used smokeless tobacco.    Alcohol Hx:   reports no history of alcohol use. Illicit Drug Hx:  Reports no history of illicit drug use. The history is provided by the patient. Last Tetanus (if applicable): N/A    Review of Systems   Constitutional: Negative for chills, diaphoresis, fatigue and fever. HENT: Negative for congestion, ear pain, postnasal drip, rhinorrhea, sinus pressure and sore throat. Eyes: Negative for pain, discharge and redness. Respiratory: Positive for shortness of breath. Negative for cough and wheezing.     Cardiovascular: Positive for chest pain (Re-created with deep breathing and palpation). Negative for palpitations and leg swelling. Gastrointestinal: Negative for abdominal distention, abdominal pain, blood in stool, constipation, diarrhea, nausea and vomiting. Genitourinary: Negative for difficulty urinating, dysuria, flank pain, frequency and hematuria. Musculoskeletal: Negative for arthralgias, back pain, myalgias and neck pain. Skin: Negative for rash and wound. Neurological: Negative for dizziness, syncope, weakness, light-headedness, numbness and headaches. Hematological: Negative for adenopathy. All other systems reviewed and are negative. Physical Exam  Vitals signs and nursing note reviewed. Constitutional:       General: She is awake. She is not in acute distress. Appearance: She is not diaphoretic. HENT:      Head: Normocephalic and atraumatic. Right Ear: External ear normal.      Left Ear: External ear normal.      Nose: Nose normal. No congestion or rhinorrhea. Mouth/Throat:      Mouth: Mucous membranes are moist.      Pharynx: Oropharynx is clear. No oropharyngeal exudate or posterior oropharyngeal erythema. Eyes:      General: No scleral icterus. Right eye: No discharge. Left eye: No discharge. Extraocular Movements: Extraocular movements intact. Conjunctiva/sclera: Conjunctivae normal.   Neck:      Musculoskeletal: Normal range of motion and neck supple. No neck rigidity or muscular tenderness. Cardiovascular:      Rate and Rhythm: Normal rate and regular rhythm. Heart sounds: Normal heart sounds. No murmur. No friction rub. No gallop. Comments: Upper extremity and lower extremity distal pulses intact bilaterally +2/4  Pulmonary:      Effort: Pulmonary effort is normal. No respiratory distress. Breath sounds: Normal breath sounds. No wheezing, rhonchi or rales. Comments: Lungs clear to auscultation bilaterally. Good air movement noted throughout.   Chest:      Chest wall: Tenderness (Mid chest) present. Abdominal:      General: Bowel sounds are normal. There is no distension. Palpations: Abdomen is soft. Tenderness: There is no abdominal tenderness. There is no guarding or rebound. Musculoskeletal: Normal range of motion. General: No tenderness or deformity. Right lower leg: No edema. Left lower leg: No edema. Lymphadenopathy:      Cervical: No cervical adenopathy. Skin:     General: Skin is warm and dry. Capillary Refill: Capillary refill takes less than 2 seconds. Findings: No erythema or rash. Neurological:      General: No focal deficit present. Mental Status: She is alert and oriented to person, place, and time. Sensory: No sensory deficit. Motor: No weakness. Coordination: Coordination normal.        ---------------------------------- PAST HISTORY ---------------------------------------------  Past Medical History:  has a past medical history of [facility] cancer ([facility] Cancer [address] Diabetes mellitus ([facility] Headache(784.0), Hyperlipidemia, Hypertension, Lumbar herniated disc, and Migraine. Past Surgical History:  has a past surgical history that includes Cholecystectomy; Tubal ligation; LEEP; Tonsillectomy; Tibia fracture surgery; Ankle surgery; Breast biopsy; Breast lumpectomy; and Hysterectomy. Social History:  reports that she quit smoking about 5 years ago. She smoked 0.00 packs per day for 0.00 years. She has never used smokeless tobacco. She reports that she does not drink alcohol or use drugs. Family History: family history includes Alcohol Abuse in her father; Asthma in her daughter and son; Cancer in her father, maternal grandfather, maternal grandmother, paternal grandfather, and paternal grandmother; Diabetes in her maternal grandmother and mother; Heart Attack in her father; Hypotension in her father. Home Meds: Not in a hospital admission.   The patients home medications have been reviewed. Allergies: Patient has no known allergies. ------------------------- NURSING NOTES AND VITALS REVIEWED ---------------------------  Date / Time Roomed:  9/21/2020  8:46 PM  ED Bed Assignment:  12/12    The nursing notes within the ED encounter and vital signs as below have been reviewed. BP (!) 157/91   Pulse 90   Temp 98.2 °F (36.8 °C) (Oral)   Resp 18   Ht 5' 7\" (1.702 m)   Wt 240 lb (108.9 kg)   LMP 08/16/2011   SpO2 97%   BMI 37.59 kg/m²   -------------------------------------------------- RESULTS / INTERVENTIONS -------------------------------------------------  All laboratory and radiology tests have been reviewed by this physician.     LABS:  Results for orders placed or performed during the hospital encounter of 09/21/20   CBC Auto Differential   Result Value Ref Range    WBC 8.8 4.5 - 11.5 E9/L    RBC 4.80 3.50 - 5.50 E12/L    Hemoglobin 13.1 11.5 - 15.5 g/dL    Hematocrit 41.2 34.0 - 48.0 %    MCV 85.8 80.0 - 99.9 fL    MCH 27.3 26.0 - 35.0 pg    MCHC 31.8 (L) 32.0 - 34.5 %    RDW 13.3 11.5 - 15.0 fL    Platelets 555 805 - 190 E9/L    MPV 10.2 7.0 - 12.0 fL    Neutrophils % 57.7 43.0 - 80.0 %    Immature Granulocytes % 0.6 0.0 - 5.0 %    Lymphocytes % 33.6 20.0 - 42.0 %    Monocytes % 5.9 2.0 - 12.0 %    Eosinophils % 1.4 0.0 - 6.0 %    Basophils % 0.8 0.0 - 2.0 %    Neutrophils Absolute 5.10 1.80 - 7.30 E9/L    Immature Granulocytes # 0.05 E9/L    Lymphocytes Absolute 2.96 1.50 - 4.00 E9/L    Monocytes Absolute 0.52 0.10 - 0.95 E9/L    Eosinophils Absolute 0.12 0.05 - 0.50 E9/L    Basophils Absolute 0.07 0.00 - 0.20 O3/K   Basic Metabolic Panel w/ Reflex to MG   Result Value Ref Range    Sodium 136 132 - 146 mmol/L    Potassium reflex Magnesium 3.7 3.5 - 5.0 mmol/L    Chloride 97 (L) 98 - 107 mmol/L    CO2 27 22 - 29 mmol/L    Anion Gap 12 7 - 16 mmol/L    Glucose 282 (H) 74 - 99 mg/dL    BUN 13 6 - 20 mg/dL    CREATININE 0.8 0.5 - 1.0 mg/dL    GFR Non-African American >60 >=60 presents the chief complaint of chest pain. Patient states that the past couple of days she has had sporadic episodes of substernal chest pain that she describes as being sharp, stabbing sensations that is nonradiating. Patient states that time she will feel little short of breath associate with the chest pain. Nothing makes it better or worse. She denies any other lightheadedness, dizziness, diaphoresis, abdominal pain, nausea, vomiting. Patient denies any history of MI in the past.  No recent stress test.  She denies any history of hypertension, hyperlipidemia, smoking. She is a diabetic and uses insulin. Her father did have a heart attack at the age of 64. Her mother just had open heart surgery. She has been taking ibuprofen for her symptoms. She is not currently having any chest pain at this time. On examination the patient is resting comfortably in bed. Clear breath sounds in all lung fields. Regular rate and rhythm of the heart. No abdominal tenderness palpation. No lower extremity edema. Patient is awake, alert, oriented x3. No focal neurological deficit. Normal HEENT exam.  EKG shows no acute ischemic changes. Eboni Espinoza DO      [MS]   [phone] On reevaluation, the patient is resting comfortably in bed. I informed her the status of her work-up and our plan to discharge her with recommended follow-up in 2 to 3 days with her PCP as soon as possible with cardiology. The patient verbalized understanding and agree with that plan and stated her intention to follow-up. At this time she is stable and safe for discharge. [ML]      ED Course User Index  [ML] Elsa Officer,   [MS] Paddy Pope DO     8953: All results were discussed with the patient and I have provided specific details regarding the plan of care, diagnosis and associated prognosis.  The patient tolerated the visit well and, at the time of discharge, she was without objective evidence of hemodynamic instability or physician.        Ibis Herrmann, DO  Resident  09/21/20 8027

## 2020-09-23 LAB
EKG ATRIAL RATE: 90 BPM
EKG P AXIS: 48 DEGREES
EKG P-R INTERVAL: 148 MS
EKG Q-T INTERVAL: 374 MS
EKG QRS DURATION: 86 MS
EKG QTC CALCULATION (BAZETT): 457 MS
EKG R AXIS: 51 DEGREES
EKG T AXIS: 43 DEGREES
EKG VENTRICULAR RATE: 90 BPM

## 2020-09-23 PROCEDURE — 93010 ELECTROCARDIOGRAM REPORT: CPT | Performed by: INTERNAL MEDICINE

## 2020-11-16 ENCOUNTER — OFFICE VISIT (OUTPATIENT)
Dept: PRIMARY CARE CLINIC | Age: 45
End: 2020-11-16
Payer: COMMERCIAL

## 2020-11-16 VITALS
SYSTOLIC BLOOD PRESSURE: 156 MMHG | WEIGHT: 200 LBS | DIASTOLIC BLOOD PRESSURE: 92 MMHG | RESPIRATION RATE: 16 BRPM | BODY MASS INDEX: 30.31 KG/M2 | HEIGHT: 68 IN | OXYGEN SATURATION: 98 % | HEART RATE: 102 BPM | TEMPERATURE: 97.4 F

## 2020-11-16 LAB — S PYO AG THROAT QL: NORMAL

## 2020-11-16 PROCEDURE — 87880 STREP A ASSAY W/OPTIC: CPT | Performed by: NURSE PRACTITIONER

## 2020-11-16 PROCEDURE — 99213 OFFICE O/P EST LOW 20 MIN: CPT | Performed by: NURSE PRACTITIONER

## 2020-11-16 RX ORDER — INSULIN GLARGINE 100 [IU]/ML
INJECTION, SOLUTION SUBCUTANEOUS
COMMUNITY
End: 2021-07-23

## 2020-11-16 RX ORDER — BENZONATATE 100 MG/1
CAPSULE ORAL
Qty: 30 CAPSULE | Refills: 0 | Status: SHIPPED
Start: 2020-11-16 | End: 2021-07-23

## 2020-11-16 NOTE — LETTER
[address] [place] [address]  Phone: [address] Flor Rodriguez CNP        November 16, 2020     Patient: Shruthi Salvador   YOB: 1975   Date of Visit: 11/16/2020       To Whom it May Concern:    Kristina Flynn was seen in my clinic on 11/16/2020. She may return to work on 11/21/20. If you have any questions or concerns, please don't hesitate to call.     Sincerely,         ENEDINA Fowler - CNP

## 2020-11-16 NOTE — PROGRESS NOTES
20  Adelia Restrepo : 1975 Sex: female  Age 45 y.o. Subjective:  Chief Complaint   Patient presents with    Pharyngitis    Cough     clear phlegm x 2 days    Shortness of Breath       HPI:   Adelia Restrepo , 45 y.o. female presents to the clinic for evaluation of sore throat, intermittent productive cough, and intermittent mild shortness of breath with activity x 2 days. The patient has not taken treatments for symptoms. The patient reports unchanged symptoms over time. The patient reports positive COVID-19 known ill exposure. The patient denies acute loss of taste or smell, headache, sinus congestion, rash, and ear pain. The patient denies body aches, chills, fever, and fatigue. The patient also denies chest pain, abdominal pain, wheezing, and nausea / vomiting / diarrhea. ROS:   Unless otherwise stated in this report the patient's positive and negative responses for review of systems for constitutional, eyes, ENT, cardiovascular, respiratory, gastrointestinal, neurological, , musculoskeletal, and integument systems and related systems to the presenting problem are either stated in the history of present illness or were not pertinent or were negative for the symptoms and/or complaints related to the presenting medical problem. Positives and pertinent negatives as per HPI. All others reviewed and are negative.       PMH:     Past Medical History:   Diagnosis Date    [facility] cancer ([facility] [address]     Cancer ([facility] [address] [facility]     Breast    Diabetes mellitus ([facility] [facility]     Headache(784.0)     migraines    Hyperlipidemia     Hypertension     Lumbar herniated disc 2012    x3    Migraine        Past Surgical History:   Procedure Laterality Date    ANKLE SURGERY      BREAST BIOPSY      BREAST LUMPECTOMY      CHOLECYSTECTOMY      HYSTERECTOMY      LEEP      4 PROCEDURES    TIBIA FRACTURE SURGERY      TONSILLECTOMY      TUBAL LIGATION         Family History   Problem Relation Age of Onset    Asthma Daughter     Diabetes Mother     Alcohol Abuse Father     Heart Attack Father     Hypotension Father     Cancer Father         lung    Cancer Maternal Grandmother     Diabetes Maternal Grandmother     Cancer Maternal Grandfather     Cancer Paternal Grandmother     Cancer Paternal Grandfather     Asthma Son        Medications:     Current Outpatient Medications:     insulin glargine (BASAGLAR KWIKPEN) 100 UNIT/ML injection pen, Basaglar KwikPen U-100 Insulin 100 unit/mL (3 mL) subcutaneous  Inject 60 units every day by subcutaneous route for 30 days. , Disp: , Rfl:     benzonatate (TESSALON PERLES) 100 MG capsule, 1-2 tablets every 8 hours as needed for cough, Disp: 30 capsule, Rfl: 0    sodium chloride (ALTAMIST SPRAY) 0.65 % nasal spray, 1 spray by Nasal route as needed for Congestion, Disp: 1 Bottle, Rfl: 3    metFORMIN (GLUCOPHAGE) 1000 MG tablet, Take 1,000 mg by mouth 2 times daily (with meals), Disp: , Rfl:     tamoxifen (NOLVADEX) 20 MG tablet, Take 20 mg by mouth nightly, Disp: , Rfl:     insulin aspart (NOVOLOG) 100 UNIT/ML injection vial, Inject 0-12 Units into the skin 3 times daily (before meals) *Per Sliding Scale*, Disp: , Rfl:     STEGLATRO 5 MG TABS, Take 5 mg by mouth daily , Disp: , Rfl:     insulin glargine (BASAGLAR KWIKPEN) 100 UNIT/ML injection pen, Inject 60 Units into the skin nightly, Disp: , Rfl:     omeprazole (PRILOSEC) 20 MG delayed release capsule, Take 20 mg by mouth daily, Disp: , Rfl:     INSULIN SYRINGE .5CC/29G (AIMSCO INSULIN SYR ULTRA THIN) 29G X 1/2\" 0.5 ML MISC, Use as directed, Disp: 50 each, Rfl: 3    Insulin Pen Needle 32G X 6 MM MISC, 1 Device by Does not apply route daily, Disp: 100 each, Rfl: 12    Blood Glucose Monitoring Suppl (FREESTYLE LITE) BIRDIE, , Disp: , Rfl: 0    B-D INS SYRINGE 0.5CC/31GX5/16 31G X 5/16\" 0.5 ML MISC, , Disp: , Rfl: 0    glucose blood VI test strips (ONE TOUCH ULTRA TEST) strip, Check blood sugar 4 times a day, Disp: 150 each, Rfl: 12    Blood Glucose Monitoring Suppl (ONE TOUCH ULTRA MINI) W/DEVICE KIT, Check blood sugar bid, Disp: 1 kit, Rfl: 0    ONE TOUCH LANCETS MISC, Check blood sugar bid, Disp: 100 each, Rfl: 12    ibuprofen (IBU) 800 MG tablet, Take 1 tablet by mouth every 8 hours as needed for Pain, Disp: 30 tablet, Rfl: 0    Allergies:   No Known Allergies    Social History:     Social History     Tobacco Use    Smoking status: Former Smoker     Packs/day: 0.00     Years: 0.00     Pack years: 0.00     Last attempt to quit:      Years since quittin.8    Smokeless tobacco: Never Used   Substance Use Topics    Alcohol use: No    Drug use: No     Types: Other-see comments     Comment: She admits to Cannabidiol use (2016)       Patient lives at home. Physical Exam:     Vitals:    20 0900 20 0912   BP: (!) 156/92    Site: Left Upper Arm    Position: Sitting    Cuff Size: Large Adult    Pulse: 112 102   Resp: 18 16   Temp: 97.4 °F (36.3 °C)    TempSrc: Skin    SpO2: 98% 98%   Weight: 200 lb (90.7 kg)    Height: 5' 8\" (1.727 m)        Physical Exam (PE)    Physical Exam  Constitutional:       Appearance: Normal appearance. HENT:      Head: Normocephalic. Right Ear: External ear normal.      Left Ear: External ear normal.      Nose: Rhinorrhea present. Mouth/Throat:      Mouth: Mucous membranes are moist.      Pharynx: Oropharynx is clear. Posterior oropharyngeal erythema present. No oropharyngeal exudate. Eyes:      Pupils: Pupils are equal, round, and reactive to light. Neck:      Musculoskeletal: Normal range of motion and neck supple. Cardiovascular:      Rate and Rhythm: Normal rate and regular rhythm. Pulses: Normal pulses. Heart sounds: Normal heart sounds. Pulmonary:      Effort: Pulmonary effort is normal. No respiratory distress. Breath sounds: Normal breath sounds. No wheezing, rhonchi or rales.    Abdominal:      General: Bowel sounds are normal. worsen or change. Pt verbalizes understanding and is in agreement with plan of care. All questions answered.     SIGNATURE: ENEDINA Walters-CNP

## 2020-11-16 NOTE — PATIENT INSTRUCTIONS
chances of quitting for good. · Use a vaporizer or humidifier to add moisture to your bedroom. Follow the directions for cleaning the machine. When should you call for help? Call your doctor now or seek immediate medical care if:    · You have new or worse trouble swallowing.     · Your sore throat gets much worse on one side. Watch closely for changes in your health, and be sure to contact your doctor if you do not get better as expected. Where can you learn more? Go to [email or web address] and sign in to your [email or web address] account. Enter U898 in the [employer] box to learn more about \"Sore Throat: Care Instructions. \"     If you do not have an account, please click on the \"Sign Up Now\" link. Current as of: April 15, 2020               Content Version: 12.6  © 5887-0660 [email or web address]. Care instructions adapted under license by [facility] ([facility]). If you have questions about a medical condition or this instruction, always ask your healthcare professional. [place] [address] any warranty or liability for your use of this information. Patient Education        Learning About Coronavirus [phone])  Coronavirus [phone]): Overview  What is coronavirus (ESAGB-58)? The coronavirus disease (COVID-19) is caused by a virus. It is an illness that was first found in December 2019. It has since spread worldwide. The virus can cause fever, cough, and trouble breathing. In severe cases, it can cause pneumonia and make it hard to breathe without help. It can cause death. This virus spreads person-to-person through droplets from coughing and sneezing. It can also spread when you are close to someone who is infected. And it can spread when you touch something that has the virus on it, such as a doorknob or a tabletop. Coronaviruses are a large group of viruses. They cause the common cold.  They also cause more serious illnesses like [place] respiratory syndrome (MERS) and severe acute respiratory syndrome (SARS). COVID-19 is caused by a novel coronavirus. That means it's a new type that has not been seen in people before. How is COVID-19 treated? Mild illness can be treated at home, but more serious illness needs to be treated in the hospital. Treatment may include medicines to reduce symptoms, plus breathing support such as oxygen therapy or a ventilator. Other treatments, such as antiviral medicines, may help people who have COVID-19. What can you do to protect yourself from COVID-19? The best way to protect yourself from getting sick is to:  · Avoid areas where there is an outbreak. · Avoid contact with people who may be infected. · Avoid crowds and try to stay at least 6 feet away from other people. · Wash your hands often, especially after you cough or sneeze. Use soap and water, and scrub for at least 20 seconds. If soap and water aren't available, use an alcohol-based hand . · Avoid touching your mouth, nose, and eyes. What can you do to avoid spreading the virus to others? To help avoid spreading the virus to others:  · Wash your hands often with soap or alcohol-based hand sanitizers. · Cover your mouth with a tissue when you cough or sneeze. Then throw the tissue in the trash. · Use a disinfectant to clean things that you touch often. These include doorknobs, remote controls, phones, and handles on your refrigerator and microwave. And don't forget countertops, tabletops, bathrooms, and computer keyboards. · Wear a cloth face cover if you have to go to public areas. If you know or suspect that you have COVID-19:  · Stay home. Don't go to school, work, or public areas. And don't use public transportation, ride-shares, or taxis unless you have no choice. · Leave your home only if you need to get medical care or testing. But call the doctor's office first so they know you're coming. And wear a face cover.   · Limit contact with people in your home. If possible, stay in a separate bedroom and use a separate bathroom. · Wear a face cover whenever you're around other people. It can help stop the spread of the virus when you cough or sneeze. · Clean and disinfect your home every day. Use household  and disinfectant wipes or sprays. Take special care to clean things that you grab with your hands. · Self-isolate until it's safe to be around others again. ? If you have symptoms, it's safe when you haven't had a fever for 3 days and your symptoms have improved and it's been at least 10 days since your symptoms started. ? If you were exposed to the virus but don't have symptoms, it's safe to be around others 14 days after exposure. ? Talk to your doctor about whether you also need testing, especially if you have a weakened immune system. When to call for help  Call 911 anytime you think you may need emergency care. For example, call if:  · You have severe trouble breathing. (You can't talk at all.)  · You have constant chest pain or pressure. · You are severely dizzy or lightheaded. · You are confused or can't think clearly. · Your face and lips have a blue color. · You passed out (lost consciousness) or are very hard to wake up. Call your doctor now if you develop symptoms such as:  · Shortness of breath. · Fever. · Cough. If you need to get care, call ahead to the doctor's office for instructions before you go. Make sure you wear a face cover to prevent exposing other people to the virus. Where can you get the latest information? The following health organizations are tracking and studying this virus. Their websites contain the most up-to-date information. Sydney Connolly also learn what to do if you think you may have been exposed to the virus. · U.S. Centers for Disease Control and Prevention (CDC): The CDC provides updated news about the disease and travel advice. The website also tells you how to prevent the spread of infection. www.cdc.gov  · World Health Organization [facility]): WHO offers information about the virus outbreaks. WHO also has travel advice. www.who.int  Current as of: July 10, 2020               Content Version: 12.6  © 2006-2020 [employer]. Care instructions adapted under license by [facility] ([facility]). If you have questions about a medical condition or this instruction, always ask your healthcare professional. [place] [address] any warranty or liability for your use of this information.

## 2020-11-17 ENCOUNTER — HOSPITAL ENCOUNTER (EMERGENCY)
Age: 45
Discharge: HOME | End: 2020-11-17
Payer: COMMERCIAL

## 2020-11-17 VITALS
TEMPERATURE: 97.2 F | RESPIRATION RATE: 14 BRPM | BODY MASS INDEX: 36.13 KG/M2 | HEART RATE: 98 BPM | SYSTOLIC BLOOD PRESSURE: 160 MMHG | HEIGHT: 68 IN | DIASTOLIC BLOOD PRESSURE: 92 MMHG | OXYGEN SATURATION: 98 % | WEIGHT: 238.38 LBS

## 2020-11-17 DIAGNOSIS — Z20.822 EXPOSURE TO COVID-19 VIRUS: ICD-10-CM

## 2020-11-17 PROCEDURE — 12002 RPR S/N/AX/GEN/TRNK2.6-7.5CM: CPT

## 2020-11-17 PROCEDURE — 6370000000 HC RX 637 (ALT 250 FOR IP): Performed by: PHYSICIAN ASSISTANT

## 2020-11-17 PROCEDURE — 6360000002 HC RX W HCPCS: Performed by: PHYSICIAN ASSISTANT

## 2020-11-17 PROCEDURE — 90715 TDAP VACCINE 7 YRS/> IM: CPT | Performed by: PHYSICIAN ASSISTANT

## 2020-11-17 PROCEDURE — 99283 EMERGENCY DEPT VISIT LOW MDM: CPT

## 2020-11-17 PROCEDURE — 90471 IMMUNIZATION ADMIN: CPT | Performed by: PHYSICIAN ASSISTANT

## 2020-11-17 RX ORDER — CEPHALEXIN 500 MG/1
500 CAPSULE ORAL 4 TIMES DAILY
Qty: 28 CAPSULE | Refills: 0 | Status: SHIPPED | OUTPATIENT
Start: 2020-11-17 | End: 2020-11-24

## 2020-11-17 RX ORDER — CEPHALEXIN 250 MG/1
500 CAPSULE ORAL ONCE
Status: COMPLETED | OUTPATIENT
Start: 2020-11-17 | End: 2020-11-17

## 2020-11-17 RX ADMIN — TETANUS TOXOID, REDUCED DIPHTHERIA TOXOID AND ACELLULAR PERTUSSIS VACCINE, ADSORBED 0.5 ML: 5; 2.5; 8; 8; 2.5 SUSPENSION INTRAMUSCULAR at 17:59

## 2020-11-17 RX ADMIN — CEPHALEXIN 500 MG: 250 CAPSULE ORAL at 18:00

## 2020-11-17 ASSESSMENT — PAIN DESCRIPTION - ORIENTATION: ORIENTATION: LEFT

## 2020-11-17 ASSESSMENT — PAIN DESCRIPTION - DESCRIPTORS: DESCRIPTORS: DISCOMFORT

## 2020-11-17 ASSESSMENT — PAIN DESCRIPTION - PAIN TYPE: TYPE: ACUTE PAIN

## 2020-11-17 ASSESSMENT — PAIN SCALES - GENERAL: PAINLEVEL_OUTOF10: 3

## 2020-11-17 ASSESSMENT — PAIN DESCRIPTION - LOCATION: LOCATION: FOOT

## 2020-11-17 NOTE — ED PROVIDER NOTES
[facility]                                                                                                                                      Department of Emergency Medicine   ED  Provider Note  Admit Date/RoomTime: 11/17/2020  5:31 PM  ED Room: [address]        HPI:  11/17/20,   Time: 5:53 PM OFE Orourke is a 45 y.o. female presenting to the ED for laceration left foot, beginning few hours ago. The complaint has been persistent, moderate in severity, and worsened by walking. The patient states she was trying to cut some firewood and dropped the hatchet on her left foot. Has bleeding wound over dorsal surface left MTP joint. Mild bleeding and gaping   Last Td uncertain. Pt states she can ambulate. No pain in left ankle or knee. Pt is not on any blood thinners. ROS:     Constitutional: Negative for fever and chills  HENT: Negative for ear pain, sore throat and sinus pressure  Eyes: Negative for pain, discharge and redness  Respiratory:  Negative for shortness of breath, cough and wheezing  Cardiovascular: Negative for CP, edema or palpitations  Gastrointestinal: Negative for nausea, vomiting, diarrhea and abdominal distention  Genitourinary: Negative for dysuria and frequency  Musculoskeletal: See HPI  Skin: See HPI  Neurological: Negative for weakness and headaches  Hematological: Negative for adenopathy    All other systems reviewed and are negative      -------------------------------- PAST HISTORY ----------------------------------  Past Medical History:  has a past medical history of Breast cancer ([facility] [address] Cancer ([facility] [address] Diabetes mellitus ([facility] [address] Headache(784.0), Hyperlipidemia, Hypertension, Lumbar herniated disc, and Migraine. Past Surgical History:  has a past surgical history that includes Cholecystectomy; Tubal ligation; LEEP; Tonsillectomy; Tibia fracture surgery; Ankle surgery; Breast biopsy; Breast lumpectomy; and Hysterectomy.     Social History:  reports that she quit smoking about 5 years ago. She smoked 0.00 packs per day for 0.00 years. She has never used smokeless tobacco. She reports that she does not drink alcohol or use drugs. Family History: family history includes Alcohol Abuse in her father; Asthma in her daughter and son; Cancer in her father, maternal grandfather, maternal grandmother, paternal grandfather, and paternal grandmother; Diabetes in her maternal grandmother and mother; Heart Attack in her father; Hypotension in her father. The patients home medications have been reviewed. Allergies: Patient has no known allergies. --------------------------------- RESULTS ------------------------------------------  All laboratory and radiology results have been personally reviewed by myself   LABS:  No results found for this visit on 11/17/20. RADIOLOGY:  Interpreted by Radiologist.  No orders to display       ----------------- NURSING NOTES AND VITALS REVIEWED ---------------   The nursing notes within the ED encounter and vital signs as below have been reviewed. BP (!) 160/92   Pulse 98   Temp 97.2 °F (36.2 °C)   Resp 14   Ht 5' 8\" (1.727 m)   Wt 238 lb 6 oz (108.1 kg)   LMP 08/16/2011   SpO2 98%   BMI 36.24 kg/m²   Oxygen Saturation Interpretation: Normal      --------------------------------PHYSICAL EXAM------------------------------------      Constitutional/General: Alert and oriented x3, well appearing, non toxic in NAD  Head: NC/AT  Eyes: PERRL, EOMI  Mouth: Oropharynx clear, handling secretions, no trismus  Neck: Supple, full ROM, no meningeal signs  Pulmonary: Lungs clear to auscultation bilaterally, no wheezes, rales, or rhonchi. Not in respiratory distress  Cardiovascular:  Regular rate and rhythm, no murmurs, gallops, or rubs. 2+ distal pulses  Extremities: Moves all extremities x 4. 2 cm laceration over dorsal surface left 1st MTP joint. Mild gaping and bleeding. No visible foreign body. Wound edges straight.   ROM left great toe intact. Decreased sensation (Chronic per patient)   Warm and well perfused  Skin: See above  Neurologic: GCS 15,  Intact. No focal deficits  Psych: Normal Affect      ------------------------ ED COURSE/MEDICAL DECISION MAKING----------------------  Medications   Tetanus-Diphth-Acell Pertussis (BOOSTRIX) injection 0.5 mL (0.5 mLs Intramuscular Given 11/17/20 1759)   cephALEXin (KEFLEX) capsule 500 mg (500 mg Oral Given 11/17/20 1800)         Medical Decision Making:    The patient's wound has been repaired. She tolerated this well. A dry sterile dressing was applied. Discussed wound care. Sutures out in 10 days time. We did update her tetanus. Plan discharge home with Keflex secondary to the history of diabetes. She will need to watch this wound carefully. She was given her first dose here. Counseling: The emergency provider has spoken with the patient and discussed todays results, in addition to providing specific details for the plan of care and counseling regarding the diagnosis and prognosis. Questions are answered at this time and they are agreeable with the plan.      ------------------------ IMPRESSION AND DISPOSITION -------------------------------    IMPRESSION  1.  Foot laceration, left, initial encounter        DISPOSITION  Disposition: Discharge to home  Patient condition is stable                    Frederick Alexander PA-C  11/17/20 1800

## 2020-11-18 LAB — SARS-COV-2, PCR: NOT DETECTED

## 2021-07-01 ENCOUNTER — HOSPITAL ENCOUNTER (OUTPATIENT)
Dept: ULTRASOUND IMAGING | Age: 46
Discharge: HOME | End: 2021-07-01
Payer: COMMERCIAL

## 2021-07-01 ENCOUNTER — HOSPITAL ENCOUNTER (OUTPATIENT)
Dept: MAMMOGRAPHY | Age: 46
Discharge: HOME | End: 2021-07-03
Payer: COMMERCIAL

## 2021-07-01 VITALS — WEIGHT: 200 LBS | BODY MASS INDEX: 31.39 KG/M2 | HEIGHT: 67 IN

## 2021-07-01 DIAGNOSIS — R92.8 ABNORMAL MAMMOGRAM: ICD-10-CM

## 2021-07-01 DIAGNOSIS — C50.912 MALIGNANT NEOPLASM OF LEFT BREAST, STAGE 2 (HCC): ICD-10-CM

## 2021-07-01 DIAGNOSIS — N63.0 BREAST LUMP: ICD-10-CM

## 2021-07-01 PROCEDURE — 76642 ULTRASOUND BREAST LIMITED: CPT

## 2021-07-01 PROCEDURE — G0279 TOMOSYNTHESIS, MAMMO: HCPCS

## 2021-07-23 ENCOUNTER — HOSPITAL ENCOUNTER (EMERGENCY)
Age: 46
Discharge: HOME | End: 2021-07-23
Attending: EMERGENCY MEDICINE
Payer: COMMERCIAL

## 2021-07-23 VITALS
RESPIRATION RATE: 16 BRPM | HEART RATE: 92 BPM | DIASTOLIC BLOOD PRESSURE: 78 MMHG | OXYGEN SATURATION: 98 % | WEIGHT: 216 LBS | SYSTOLIC BLOOD PRESSURE: 111 MMHG | BODY MASS INDEX: 33.83 KG/M2 | TEMPERATURE: 97.3 F

## 2021-07-23 DIAGNOSIS — N39.0 URINARY TRACT INFECTION WITHOUT HEMATURIA, SITE UNSPECIFIED: Primary | ICD-10-CM

## 2021-07-23 LAB
BACTERIA: ABNORMAL /HPF
BILIRUBIN URINE: NEGATIVE
BLOOD, URINE: ABNORMAL
CLARITY: CLEAR
COLOR: ABNORMAL
EPITHELIAL CELLS, UA: ABNORMAL /HPF
GLUCOSE URINE: 500 MG/DL
KETONES, URINE: NEGATIVE MG/DL
LEUKOCYTE ESTERASE, URINE: NEGATIVE
NITRITE, URINE: NEGATIVE
PH UA: 5 (ref 5–9)
PROTEIN UA: NEGATIVE MG/DL
RBC UA: ABNORMAL /HPF (ref 0–2)
SPECIFIC GRAVITY UA: <=1.005 (ref 1–1.03)
UROBILINOGEN, URINE: 0.2 E.U./DL
WBC UA: ABNORMAL /HPF (ref 0–5)

## 2021-07-23 PROCEDURE — 81001 URINALYSIS AUTO W/SCOPE: CPT

## 2021-07-23 PROCEDURE — 99283 EMERGENCY DEPT VISIT LOW MDM: CPT

## 2021-07-23 RX ORDER — CEFDINIR 300 MG/1
300 CAPSULE ORAL 2 TIMES DAILY
Qty: 20 CAPSULE | Refills: 0 | Status: SHIPPED | OUTPATIENT
Start: 2021-07-23 | End: 2021-08-02

## 2021-07-23 RX ORDER — LISINOPRIL 20 MG/1
20 TABLET ORAL DAILY
COMMUNITY
End: 2022-06-24 | Stop reason: SDUPTHER

## 2021-07-23 ASSESSMENT — ENCOUNTER SYMPTOMS
EYE PAIN: 0
SORE THROAT: 0
EYE REDNESS: 0
ABDOMINAL DISTENTION: 0
EYE DISCHARGE: 0
VOMITING: 0
SINUS PRESSURE: 0
SHORTNESS OF BREATH: 0
DIARRHEA: 0
BACK PAIN: 0
WHEEZING: 0
NAUSEA: 0
COUGH: 0

## 2021-07-23 ASSESSMENT — PAIN DESCRIPTION - PAIN TYPE: TYPE: ACUTE PAIN

## 2021-07-23 ASSESSMENT — PAIN SCALES - GENERAL: PAINLEVEL_OUTOF10: 7

## 2021-07-23 ASSESSMENT — PAIN DESCRIPTION - FREQUENCY
FREQUENCY: CONTINUOUS
FREQUENCY: CONTINUOUS

## 2021-07-23 ASSESSMENT — PAIN DESCRIPTION - LOCATION: LOCATION: BACK

## 2021-07-23 ASSESSMENT — PAIN DESCRIPTION - DESCRIPTORS: DESCRIPTORS: ACHING

## 2021-07-23 ASSESSMENT — PAIN DESCRIPTION - ORIENTATION: ORIENTATION: LEFT;LOWER

## 2021-07-23 NOTE — ED PROVIDER NOTES
The history is provided by the patient. Female  Problem  Pain quality:  Aching and burning  Pain severity:  Mild  Onset quality:  Gradual  Timing:  Constant  Progression:  Worsening  Chronicity:  New  Recent urinary tract infections: no    Relieved by:  Nothing  Worsened by:  Nothing  Ineffective treatments:  None tried  Urinary symptoms: frequent urination    Associated symptoms: flank pain    Associated symptoms: no fever, no nausea and no vomiting    Risk factors: hx of urolithiasis and sexually active         Review of Systems   Constitutional: Negative for chills and fever. HENT: Negative for ear pain, sinus pressure and sore throat. Eyes: Negative for pain, discharge and redness. Respiratory: Negative for cough, shortness of breath and wheezing. Cardiovascular: Negative for chest pain. Gastrointestinal: Negative for abdominal distention, diarrhea, nausea and vomiting. Genitourinary: Positive for dysuria, flank pain and frequency. Musculoskeletal: Negative for arthralgias and back pain. Skin: Negative for rash and wound. Neurological: Negative for weakness and headaches. Hematological: Negative for adenopathy. Psychiatric/Behavioral: Negative. All other systems reviewed and are negative. Physical Exam  Vitals and nursing note reviewed. Constitutional:       Appearance: She is well-developed. HENT:      Head: Normocephalic and atraumatic. Eyes:      Pupils: Pupils are equal, round, and reactive to light. Cardiovascular:      Rate and Rhythm: Normal rate and regular rhythm. Heart sounds: Normal heart sounds. No murmur heard. Pulmonary:      Effort: Pulmonary effort is normal.      Breath sounds: Normal breath sounds. Abdominal:      General: Bowel sounds are normal.      Palpations: Abdomen is soft. Tenderness: There is abdominal tenderness in the periumbilical area. There is left CVA tenderness. There is no guarding or rebound.    Musculoskeletal: (A) Negative mg/dL    Bilirubin Urine Negative Negative    Ketones, Urine Negative Negative mg/dL    Specific Gravity, UA <=1.005 1.005 - 1.030    Blood, Urine TRACE (A) Negative    pH, UA 5.0 5.0 - 9.0    Protein, UA Negative Negative mg/dL    Urobilinogen, Urine 0.2 <2.0 E.U./dL    Nitrite, Urine Negative Negative    Leukocyte Esterase, Urine Negative Negative    WBC, UA 5-10 (A) 0 - 5 /HPF    RBC, UA 2-5 0 - 2 /HPF    Epithelial Cells, UA FEW /HPF    Bacteria, UA FEW (A) None Seen /HPF     No orders to display       ------------------------- NURSING NOTES AND VITALS REVIEWED ---------------------------   The nursing notes within the ED encounter and vital signs as below have been reviewed. /78   Pulse 92   Temp 97.3 °F (36.3 °C) (Temporal)   Resp 16   Wt 216 lb (98 kg)   LMP 08/16/2011   SpO2 98%   BMI 33.83 kg/m²   Oxygen Saturation Interpretation: Normal      ------------------------------------------ PROGRESS NOTES ------------------------------------------   I have spoken with the patient and discussed todays results, in addition to providing specific details for the plan of care and counseling regarding the diagnosis and prognosis. Their questions are answered at this time and they are agreeable with the plan.      --------------------------------- ADDITIONAL PROVIDER NOTES ---------------------------------        This patient is stable for discharge. I have shared the specific conditions for return, as well as the importance of follow-up. IMPRESSION:     1.  Urinary tract infection without hematuria, site unspecified      Patient's Medications   New Prescriptions    CEFDINIR (OMNICEF) 300 MG CAPSULE    Take 1 capsule by mouth 2 times daily for 10 days   Previous Medications    B-D INS SYRINGE 0.5CC/31GX5/16 31G X 5/16\" 0.5 ML MISC        BLOOD GLUCOSE MONITORING SUPPL (FREESTYLE LITE) BIRDIE        BLOOD GLUCOSE MONITORING SUPPL (ONE TOUCH ULTRA MINI) W/DEVICE KIT    Check blood sugar bid    GLUCOSE BLOOD VI TEST STRIPS (ONE TOUCH ULTRA TEST) STRIP    Check blood sugar 4 times a day    IBUPROFEN (IBU) 800 MG TABLET    Take 1 tablet by mouth every 8 hours as needed for Pain    INSULIN GLARGINE (BASAGLAR KWIKPEN) 100 UNIT/ML INJECTION PEN    Inject 60 Units into the skin nightly    INSULIN PEN NEEDLE 32G X 6 MM MISC    1 Device by Does not apply route daily    INSULIN SYRINGE .5CC/29G (AIMSCO INSULIN SYR ULTRA THIN) 29G X 1/2\" 0.5 ML MISC    Use as directed    LISINOPRIL (PRINIVIL;ZESTRIL) 20 MG TABLET    Take 20 mg by mouth daily    METFORMIN (GLUCOPHAGE) 1000 MG TABLET    Take 1,000 mg by mouth 2 times daily (with meals)    OMEPRAZOLE (PRILOSEC) 20 MG DELAYED RELEASE CAPSULE    Take 20 mg by mouth daily    ONE TOUCH LANCETS MISC    Check blood sugar bid    STEGLATRO 5 MG TABS    Take 5 mg by mouth daily     TAMOXIFEN (NOLVADEX) 20 MG TABLET    Take 20 mg by mouth nightly   Modified Medications    No medications on file   Discontinued Medications    BENZONATATE (TESSALON PERLES) 100 MG CAPSULE    1-2 tablets every 8 hours as needed for cough    INSULIN ASPART (NOVOLOG) 100 UNIT/ML INJECTION VIAL    Inject 0-12 Units into the skin 3 times daily (before meals) *Per Sliding Scale*    INSULIN GLARGINE (BASAGLAR KWIKPEN) 100 UNIT/ML INJECTION PEN    Basaglar KwikPen U-100 Insulin 100 unit/mL (3 mL) subcutaneous   Inject 60 units every day by subcutaneous route for 30 days.     SODIUM CHLORIDE (ALTAMIST SPRAY) 0.65 % NASAL SPRAY    1 spray by Nasal route as needed for Congestion         Procedures     BISI Thomason DO  07/23/21 0993

## 2021-08-08 ENCOUNTER — HOSPITAL ENCOUNTER (EMERGENCY)
Age: 46
Discharge: HOME | End: 2021-08-08
Attending: EMERGENCY MEDICINE
Payer: COMMERCIAL

## 2021-08-08 VITALS
DIASTOLIC BLOOD PRESSURE: 83 MMHG | SYSTOLIC BLOOD PRESSURE: 129 MMHG | BODY MASS INDEX: 31.39 KG/M2 | OXYGEN SATURATION: 98 % | HEART RATE: 103 BPM | WEIGHT: 200 LBS | HEIGHT: 67 IN | TEMPERATURE: 97.3 F | RESPIRATION RATE: 16 BRPM

## 2021-08-08 DIAGNOSIS — N39.0 URINARY TRACT INFECTION WITHOUT HEMATURIA, SITE UNSPECIFIED: Primary | ICD-10-CM

## 2021-08-08 LAB
BACTERIA: ABNORMAL /HPF
BILIRUBIN URINE: NEGATIVE
BLOOD, URINE: ABNORMAL
CLARITY: ABNORMAL
COLOR: YELLOW
EPITHELIAL CELLS, UA: ABNORMAL /HPF
GLUCOSE URINE: >=1000 MG/DL
KETONES, URINE: NEGATIVE MG/DL
LEUKOCYTE ESTERASE, URINE: ABNORMAL
NITRITE, URINE: POSITIVE
PH UA: 5.5 (ref 5–9)
PROTEIN UA: NEGATIVE MG/DL
RBC UA: ABNORMAL /HPF (ref 0–2)
SPECIFIC GRAVITY UA: 1.01 (ref 1–1.03)
UROBILINOGEN, URINE: 0.2 E.U./DL
WBC UA: ABNORMAL /HPF (ref 0–5)

## 2021-08-08 PROCEDURE — 81001 URINALYSIS AUTO W/SCOPE: CPT

## 2021-08-08 PROCEDURE — 87088 URINE BACTERIA CULTURE: CPT

## 2021-08-08 PROCEDURE — 87077 CULTURE AEROBIC IDENTIFY: CPT

## 2021-08-08 PROCEDURE — 99282 EMERGENCY DEPT VISIT SF MDM: CPT

## 2021-08-08 PROCEDURE — 87186 SC STD MICRODIL/AGAR DIL: CPT

## 2021-08-08 RX ORDER — SULFAMETHOXAZOLE AND TRIMETHOPRIM 800; 160 MG/1; MG/1
1 TABLET ORAL 2 TIMES DAILY
Qty: 20 TABLET | Refills: 0 | Status: SHIPPED | OUTPATIENT
Start: 2021-08-08 | End: 2021-08-18

## 2021-08-08 RX ORDER — PHENAZOPYRIDINE HYDROCHLORIDE 100 MG/1
100 TABLET, FILM COATED ORAL 3 TIMES DAILY PRN
Qty: 6 TABLET | Refills: 0 | Status: SHIPPED | OUTPATIENT
Start: 2021-08-08 | End: 2021-08-11

## 2021-08-08 ASSESSMENT — PAIN DESCRIPTION - ONSET: ONSET: SUDDEN

## 2021-08-08 ASSESSMENT — PAIN DESCRIPTION - LOCATION: LOCATION: VAGINA

## 2021-08-08 ASSESSMENT — PAIN SCALES - GENERAL: PAINLEVEL_OUTOF10: 8

## 2021-08-08 ASSESSMENT — PAIN DESCRIPTION - FREQUENCY: FREQUENCY: INTERMITTENT

## 2021-08-08 ASSESSMENT — PAIN DESCRIPTION - PROGRESSION: CLINICAL_PROGRESSION: GRADUALLY WORSENING

## 2021-08-08 ASSESSMENT — PAIN DESCRIPTION - DESCRIPTORS: DESCRIPTORS: CONSTANT;BURNING

## 2021-08-08 ASSESSMENT — PAIN DESCRIPTION - PAIN TYPE: TYPE: ACUTE PAIN

## 2021-08-08 NOTE — ED PROVIDER NOTES
HPI:  8/8/21,   Time: 12:38 PM EDT         Juanita Akins is a 39 y.o. female presenting to the ED for vaginal itching and irritation, beginning 1 week ago. The complaint has been persistent, moderate in severity, and worsened by nothing. Patient was treated for UTI 2 weeks ago with Sae Stanton from our facility. ROS:   Pertinent positives and negatives are stated within HPI, all other systems reviewed and are negative.  --------------------------------------------- PAST HISTORY ---------------------------------------------  Past Medical History:  has a past medical history of BRCA1 gene mutation positive, Breast cancer ([facility] [address] Cancer ([facility] [address] Diabetes mellitus ([facility] [address] Headache(784.0), Hyperlipidemia, Hypertension, Lumbar herniated disc, and Migraine. Past Surgical History:  has a past surgical history that includes Cholecystectomy; Tubal ligation; LEEP; Tonsillectomy; Tibia fracture surgery; Ankle surgery; Breast biopsy; Breast lumpectomy; and Hysterectomy. Social History:  reports that she quit smoking about 6 years ago. She smoked 0.00 packs per day for 0.00 years. She has never used smokeless tobacco. She reports that she does not drink alcohol and does not use drugs. Family History: family history includes Alcohol Abuse in her father; Asthma in her daughter and son; Brain Cancer in her daughter; Cancer in her father, maternal grandfather, maternal grandmother, paternal grandfather, and paternal grandmother; Diabetes in her maternal grandmother and mother; Heart Attack in her father; Hypotension in her father; Kidney Cancer in her maternal grandfather; Lysle Hashimoto in her father. The patients home medications have been reviewed. Allergies: Patient has no known allergies.     -------------------------------------------------- RESULTS -------------------------------------------------  All laboratory and radiology results have been personally reviewed by myself   LABS:  Results for orders placed or performed during the hospital encounter of 08/08/21   Urinalysis   Result Value Ref Range    Color, UA Yellow Straw/Yellow    Clarity, UA SL CLOUDY Clear    Glucose, Ur >=1000 (A) Negative mg/dL    Bilirubin Urine Negative Negative    Ketones, Urine Negative Negative mg/dL    Specific Gravity, UA 1.015 1.005 - 1.030    Blood, Urine TRACE (A) Negative    pH, UA 5.5 5.0 - 9.0    Protein, UA Negative Negative mg/dL    Urobilinogen, Urine 0.2 <2.0 E.U./dL    Nitrite, Urine POSITIVE (A) Negative    Leukocyte Esterase, Urine TRACE (A) Negative   Microscopic Urinalysis   Result Value Ref Range    WBC, UA 10-20 (A) 0 - 5 /HPF    RBC, UA 2-5 0 - 2 /HPF    Epithelial Cells, UA FEW /HPF    Bacteria, UA MODERATE (A) None Seen /HPF       RADIOLOGY:  Interpreted by Radiologist.  No orders to display       ------------------------- NURSING NOTES AND VITALS REVIEWED ---------------------------   The nursing notes within the ED encounter and vital signs as below have been reviewed. /83   Pulse 103   Temp 97.3 °F (36.3 °C) (Temporal)   Resp 16   Ht 5' 7\" (1.702 m)   Wt 200 lb (90.7 kg)   LMP 08/16/2011   SpO2 98%   BMI 31.32 kg/m²   Oxygen Saturation Interpretation: Normal      ---------------------------------------------------PHYSICAL EXAM--------------------------------------      Constitutional/General: Alert and oriented x3, well appearing, non toxic in NAD  Head: NC/AT  Eyes: PERRL, EOMI  Mouth: Oropharynx clear, handling secretions, no trismus  Neck: Supple, full ROM, no meningeal signs  Pulmonary: Lungs clear to auscultation bilaterally, no wheezes, rales, or rhonchi. Not in respiratory distress  Cardiovascular:  Regular rate and rhythm, no murmurs, gallops, or rubs. 2+ distal pulses  Abdomen: Soft, non tender, non distended,   Extremities: Moves all extremities x 4.  Warm and well perfused  Skin: warm and dry without rash  Neurologic: GCS 15,  Psych: Normal Affect      ------------------------------ ED COURSE/MEDICAL DECISION MAKING----------------------  Medications - No data to display      Medical Decision Making:    Discussed in detail the results to the patient. We will start her on Septra DS at this time. Follow-up with PCP. Patient's Medications   New Prescriptions    PHENAZOPYRIDINE (PYRIDIUM) 100 MG TABLET    Take 1 tablet by mouth 3 times daily as needed for Pain    SULFAMETHOXAZOLE-TRIMETHOPRIM (BACTRIM DS) 800-160 MG PER TABLET    Take 1 tablet by mouth 2 times daily for 10 days   Previous Medications    B-D INS SYRINGE 0.5CC/31GX5/16 31G X 5/16\" 0.5 ML MISC        BLOOD GLUCOSE MONITORING SUPPL (FREESTYLE LITE) BIRDIE        BLOOD GLUCOSE MONITORING SUPPL (ONE TOUCH ULTRA MINI) W/DEVICE KIT    Check blood sugar bid    GLUCOSE BLOOD VI TEST STRIPS (ONE TOUCH ULTRA TEST) STRIP    Check blood sugar 4 times a day    IBUPROFEN (IBU) 800 MG TABLET    Take 1 tablet by mouth every 8 hours as needed for Pain    INSULIN GLARGINE (BASAGLAR KWIKPEN) 100 UNIT/ML INJECTION PEN    Inject 60 Units into the skin nightly    INSULIN PEN NEEDLE 32G X 6 MM MISC    1 Device by Does not apply route daily    INSULIN SYRINGE .5CC/29G (AIMSCO INSULIN SYR ULTRA THIN) 29G X 1/2\" 0.5 ML MISC    Use as directed    LISINOPRIL (PRINIVIL;ZESTRIL) 20 MG TABLET    Take 20 mg by mouth daily    METFORMIN (GLUCOPHAGE) 1000 MG TABLET    Take 1,000 mg by mouth 2 times daily (with meals)    OMEPRAZOLE (PRILOSEC) 20 MG DELAYED RELEASE CAPSULE    Take 20 mg by mouth daily    ONE TOUCH LANCETS MISC    Check blood sugar bid    STEGLATRO 5 MG TABS    Take 5 mg by mouth daily     TAMOXIFEN (NOLVADEX) 20 MG TABLET    Take 20 mg by mouth nightly   Modified Medications    No medications on file   Discontinued Medications    No medications on file         Counseling:    The emergency provider has spoken with the patient and discussed todays results, in addition to providing specific details for the plan of care and counseling regarding the diagnosis and prognosis. Questions are answered at this time and they are agreeable with the plan.      --------------------------------- IMPRESSION AND DISPOSITION ---------------------------------    IMPRESSION  1.  Urinary tract infection without hematuria, site unspecified        DISPOSITION  Disposition: Discharge to home  Patient condition is good                 Jean Mcintosh MD  08/08/21 [id number]

## 2021-08-10 LAB
ORGANISM: ABNORMAL
URINE CULTURE, ROUTINE: ABNORMAL

## 2021-08-14 ENCOUNTER — HOSPITAL ENCOUNTER (EMERGENCY)
Age: 46
Discharge: HOME | End: 2021-08-14
Attending: EMERGENCY MEDICINE
Payer: COMMERCIAL

## 2021-08-14 VITALS
BODY MASS INDEX: 32.8 KG/M2 | RESPIRATION RATE: 18 BRPM | HEIGHT: 67 IN | SYSTOLIC BLOOD PRESSURE: 122 MMHG | DIASTOLIC BLOOD PRESSURE: 83 MMHG | HEART RATE: 91 BPM | OXYGEN SATURATION: 99 % | WEIGHT: 209 LBS | TEMPERATURE: 98 F

## 2021-08-14 DIAGNOSIS — A60.04 HERPES SIMPLEX VULVOVAGINITIS: Primary | ICD-10-CM

## 2021-08-14 LAB
ALBUMIN SERPL-MCNC: 4.3 G/DL (ref 3.5–5.2)
ALP BLD-CCNC: 68 U/L (ref 35–104)
ALT SERPL-CCNC: 29 U/L (ref 0–32)
ANION GAP SERPL CALCULATED.3IONS-SCNC: 10 MMOL/L (ref 7–16)
AST SERPL-CCNC: 18 U/L (ref 0–31)
BACTERIA: ABNORMAL /HPF
BASOPHILS ABSOLUTE: 0.06 E9/L (ref 0–0.2)
BASOPHILS RELATIVE PERCENT: 0.6 % (ref 0–2)
BILIRUB SERPL-MCNC: 0.3 MG/DL (ref 0–1.2)
BILIRUBIN URINE: NEGATIVE
BLOOD, URINE: ABNORMAL
BUN BLDV-MCNC: 15 MG/DL (ref 6–20)
CALCIUM SERPL-MCNC: 9.4 MG/DL (ref 8.6–10.2)
CHLORIDE BLD-SCNC: 102 MMOL/L (ref 98–107)
CLARITY: ABNORMAL
CLUE CELLS: NORMAL
CO2: 25 MMOL/L (ref 22–29)
COLOR: YELLOW
CREAT SERPL-MCNC: 0.8 MG/DL (ref 0.5–1)
EOSINOPHILS ABSOLUTE: 0.22 E9/L (ref 0.05–0.5)
EOSINOPHILS RELATIVE PERCENT: 2.1 % (ref 0–6)
EPITHELIAL CELLS, UA: ABNORMAL /HPF
GFR AFRICAN AMERICAN: >60
GFR NON-AFRICAN AMERICAN: >60 ML/MIN/1.73
GLUCOSE BLD-MCNC: 110 MG/DL (ref 74–99)
GLUCOSE URINE: 500 MG/DL
HCG(URINE) PREGNANCY TEST: NEGATIVE
HCT VFR BLD CALC: 42.5 % (ref 34–48)
HEMOGLOBIN: 13.8 G/DL (ref 11.5–15.5)
IMMATURE GRANULOCYTES #: 0.09 E9/L
IMMATURE GRANULOCYTES %: 0.9 % (ref 0–5)
KETONES, URINE: 15 MG/DL
LACTIC ACID: 1 MMOL/L (ref 0.5–2.2)
LEUKOCYTE ESTERASE, URINE: ABNORMAL
LIPASE: 27 U/L (ref 13–60)
LYMPHOCYTES ABSOLUTE: 3.38 E9/L (ref 1.5–4)
LYMPHOCYTES RELATIVE PERCENT: 32.3 % (ref 20–42)
MCH RBC QN AUTO: 27.7 PG (ref 26–35)
MCHC RBC AUTO-ENTMCNC: 32.5 % (ref 32–34.5)
MCV RBC AUTO: 85.3 FL (ref 80–99.9)
MONOCYTES ABSOLUTE: 0.57 E9/L (ref 0.1–0.95)
MONOCYTES RELATIVE PERCENT: 5.5 % (ref 2–12)
NEUTROPHILS ABSOLUTE: 6.13 E9/L (ref 1.8–7.3)
NEUTROPHILS RELATIVE PERCENT: 58.6 % (ref 43–80)
NITRITE, URINE: NEGATIVE
PDW BLD-RTO: 13.7 FL (ref 11.5–15)
PH UA: 5.5 (ref 5–9)
PLATELET # BLD: 339 E9/L (ref 130–450)
PMV BLD AUTO: 9.9 FL (ref 7–12)
POTASSIUM REFLEX MAGNESIUM: 4.4 MMOL/L (ref 3.5–5)
PROTEIN UA: NEGATIVE MG/DL
RBC # BLD: 4.98 E12/L (ref 3.5–5.5)
RBC UA: ABNORMAL /HPF (ref 0–2)
SODIUM BLD-SCNC: 137 MMOL/L (ref 132–146)
SOURCE WET PREP: NORMAL
SPECIFIC GRAVITY UA: >=1.03 (ref 1–1.03)
TOTAL PROTEIN: 7.7 G/DL (ref 6.4–8.3)
TRICHOMONAS PREP: NORMAL
UROBILINOGEN, URINE: 0.2 E.U./DL
WBC # BLD: 10.5 E9/L (ref 4.5–11.5)
WBC UA: ABNORMAL /HPF (ref 0–5)
YEAST WET PREP: NORMAL

## 2021-08-14 PROCEDURE — 81001 URINALYSIS AUTO W/SCOPE: CPT

## 2021-08-14 PROCEDURE — 6360000002 HC RX W HCPCS: Performed by: EMERGENCY MEDICINE

## 2021-08-14 PROCEDURE — 83605 ASSAY OF LACTIC ACID: CPT

## 2021-08-14 PROCEDURE — 96374 THER/PROPH/DIAG INJ IV PUSH: CPT

## 2021-08-14 PROCEDURE — 85025 COMPLETE CBC W/AUTO DIFF WBC: CPT

## 2021-08-14 PROCEDURE — 81025 URINE PREGNANCY TEST: CPT

## 2021-08-14 PROCEDURE — 80053 COMPREHEN METABOLIC PANEL: CPT

## 2021-08-14 PROCEDURE — 83690 ASSAY OF LIPASE: CPT

## 2021-08-14 PROCEDURE — 99284 EMERGENCY DEPT VISIT MOD MDM: CPT

## 2021-08-14 PROCEDURE — 87491 CHLMYD TRACH DNA AMP PROBE: CPT

## 2021-08-14 PROCEDURE — 87591 N.GONORRHOEAE DNA AMP PROB: CPT

## 2021-08-14 PROCEDURE — 87210 SMEAR WET MOUNT SALINE/INK: CPT

## 2021-08-14 PROCEDURE — 87040 BLOOD CULTURE FOR BACTERIA: CPT

## 2021-08-14 RX ORDER — VALACYCLOVIR HYDROCHLORIDE 1 G/1
1000 TABLET, FILM COATED ORAL 2 TIMES DAILY
Qty: 20 TABLET | Refills: 0 | Status: SHIPPED | OUTPATIENT
Start: 2021-08-14 | End: 2021-08-24

## 2021-08-14 RX ORDER — ONDANSETRON 2 MG/ML
4 INJECTION INTRAMUSCULAR; INTRAVENOUS ONCE
Status: COMPLETED | OUTPATIENT
Start: 2021-08-14 | End: 2021-08-14

## 2021-08-14 RX ADMIN — ONDANSETRON 4 MG: 2 INJECTION INTRAMUSCULAR; INTRAVENOUS at 19:43

## 2021-08-14 ASSESSMENT — ENCOUNTER SYMPTOMS
NAUSEA: 0
SHORTNESS OF BREATH: 0
ABDOMINAL PAIN: 0
EYE PAIN: 0
DIARRHEA: 0
VOMITING: 0
SINUS PAIN: 0
CHEST TIGHTNESS: 0
WHEEZING: 0
CHOKING: 0
SORE THROAT: 0
COUGH: 0

## 2021-08-14 ASSESSMENT — PAIN DESCRIPTION - PROGRESSION: CLINICAL_PROGRESSION: GRADUALLY WORSENING

## 2021-08-14 ASSESSMENT — PAIN DESCRIPTION - FREQUENCY: FREQUENCY: CONTINUOUS

## 2021-08-14 ASSESSMENT — PAIN - FUNCTIONAL ASSESSMENT: PAIN_FUNCTIONAL_ASSESSMENT: ACTIVITIES ARE NOT PREVENTED

## 2021-08-14 ASSESSMENT — PAIN DESCRIPTION - PAIN TYPE: TYPE: ACUTE PAIN

## 2021-08-14 ASSESSMENT — PAIN DESCRIPTION - ONSET: ONSET: ON-GOING

## 2021-08-14 ASSESSMENT — PAIN DESCRIPTION - ORIENTATION: ORIENTATION: RIGHT

## 2021-08-14 ASSESSMENT — PAIN SCALES - GENERAL: PAINLEVEL_OUTOF10: 10

## 2021-08-14 ASSESSMENT — PAIN DESCRIPTION - LOCATION: LOCATION: FLANK

## 2021-08-14 ASSESSMENT — PAIN DESCRIPTION - DESCRIPTORS: DESCRIPTORS: ACHING;BURNING

## 2021-08-14 NOTE — ED PROVIDER NOTES
ED  Provider Note  Admit Date/RoomTime: 8/14/2021  5:10 PM  ED Room: 06/06     HPI:   Shruthi Salvador is a 39 y.o. female presenting to the ED for vaginal pain, beginning 5 days ago. History comes primarily from the patient. Past medical history includes recent UTI diagnosis, diabetes, breast cancer, GERD. The complaint has been persistent, severe in severity, improved by nothing and worsened by urination. Associated symptoms include nausea. Elza Faye states that she was diagnosed with UTI sometime ago, and has been on two separate antibiotic regimens to attempt to treat this urinary tract infection. Despite this treatment, over the course the last 5 days she has developed a worsening rash along the lining of her labia. She states that this rash is intensely painful, and makes it difficult to urinate. She denies any contact with individuals who've had similar such symptoms. Due to the persistence and severity of her symptoms, she presented to [address] [place] [address] for further evaluation and treatment. On arrival, the patient was assessed with history, physical exam, laboratory studies, vital signs. Vital signs were stable on arrival and the patient was afebrile. Review of Systems   Constitutional: Positive for activity change. Negative for appetite change, chills and fever. HENT: Negative for sinus pain and sore throat. Eyes: Negative for pain and visual disturbance. Respiratory: Negative for cough, choking, chest tightness, shortness of breath and wheezing. Cardiovascular: Negative for chest pain and palpitations. Gastrointestinal: Negative for abdominal pain, diarrhea, nausea and vomiting. Genitourinary: Positive for dysuria, genital sores and vaginal pain. Negative for hematuria. Musculoskeletal: Negative for neck pain and neck stiffness. Skin: Negative for rash. Neurological: Negative for tremors, syncope and weakness.    Psychiatric/Behavioral: Negative for confusion. Physical Exam  Vitals and nursing note reviewed. Constitutional:       Appearance: She is well-developed. HENT:      Head: Normocephalic and atraumatic. Eyes:      Pupils: Pupils are equal, round, and reactive to light. Cardiovascular:      Rate and Rhythm: Normal rate and regular rhythm. Pulmonary:      Effort: Pulmonary effort is normal. No respiratory distress. Breath sounds: Normal breath sounds. No wheezing or rales. Abdominal:      General: Bowel sounds are normal.      Palpations: Abdomen is soft. Tenderness: There is no abdominal tenderness. There is no guarding or rebound. Genitourinary:     Comments: On pelvic exam, vulva and labia are covered in numerous grouped vesicular lesions with a sharp erythematous base. Many of the lesions have ruptured and draining serous appearing fluid. Area is exquisitely tender to palpation even with very light touch. Musculoskeletal:      Cervical back: Normal range of motion and neck supple. Skin:     General: Skin is warm and dry. Capillary Refill: Capillary refill takes less than 2 seconds. Neurological:      Mental Status: She is alert and oriented to person, place, and time. Cranial Nerves: No cranial nerve deficit. Coordination: Coordination normal.          Procedures     MDM  Number of Diagnoses or Management Options  Herpes simplex vulvovaginitis  Diagnosis management comments: Emergency Department evaluation was notable for clinical exam findings consistent with herpes simplex vulvovaginitis. Patient had no prior diagnosis of herpes for onset of the symptoms, and is unaware of any when she has come in contact with who is known to have this disease process. She was advised to avoid any and all sexual activity until such time as her lesions have fully healed and will be started on Valtrex antiviral medication.   She was advised to establish care with a primary care provider so that she can follow-up with this condition and have further antiviral medication prescribed to her as needed. She understood this plan of care and was amenable to the plan. Her vital signs remained stable throughout the entirety of her emergency department stay and she was considered stable for discharge to home with outpatient follow-up. They were advised to return to the emergency department should they develop fever, chills, night sweats, nausea, vomiting, diarrhea, chest pain, shortness of breath, or worsening of their symptoms despite treatment from this emergency department visit. ED Course as of Aug 14 2003   Sat Aug 14, 2021   Paty Mathis 1 Patient resting in bed no acute distress. Discussed results of labs thus far. [MS]   1941 Patient currently experiencing nausea and vomiting. Zofran is ordered and will reassess. [MS]      ED Course User Index  [MS] Hernán Thakkar,        ED Course as of Aug 15 0850   Sat Aug 14, 2021   9799 Patient resting in bed no acute distress. Discussed results of labs thus far. [MS]   1941 Patient currently experiencing nausea and vomiting. Zofran is ordered and will reassess. [MS]      ED Course User Index  [MS] Hernán Thakkar, DO       --------------------------------------------- PAST HISTORY ---------------------------------------------  Past Medical History:  has a past medical history of BRCA1 gene mutation positive, Breast cancer ([facility] [address] [facility] Headache(784.0), Hyperlipidemia, Hypertension, Lumbar herniated disc, and Migraine. Past Surgical History:  has a past surgical history that includes Cholecystectomy; Tubal ligation; LEEP; Tonsillectomy; Tibia fracture surgery; Ankle surgery; Breast biopsy; Breast lumpectomy; and Hysterectomy. Social History:  reports that she quit smoking about 6 years ago. She smoked 0.00 packs per day for 0.00 years.  She has never used smokeless tobacco. She reports that she does not drink alcohol and does not Potassium reflex Magnesium 4.4 3.5 - 5.0 mmol/L    Chloride 102 98 - 107 mmol/L    CO2 25 22 - 29 mmol/L    Anion Gap 10 7 - 16 mmol/L    Glucose 110 (H) 74 - 99 mg/dL    BUN 15 6 - 20 mg/dL    CREATININE 0.8 0.5 - 1.0 mg/dL    GFR Non-African American >60 >=60 mL/min/1.73    GFR African American >60     Calcium 9.4 8.6 - 10.2 mg/dL    Total Protein 7.7 6.4 - 8.3 g/dL    Albumin 4.3 3.5 - 5.2 g/dL    Total Bilirubin 0.3 0.0 - 1.2 mg/dL    Alkaline Phosphatase 68 35 - 104 U/L    ALT 29 0 - 32 U/L    AST 18 0 - 31 U/L   Lipase   Result Value Ref Range    Lipase 27 13 - 60 U/L   Urinalysis, reflex to microscopic   Result Value Ref Range    Color, UA Yellow Straw/Yellow    Clarity, UA SLCLOUDY Clear    Glucose, Ur 500 (A) Negative mg/dL    Bilirubin Urine Negative Negative    Ketones, Urine 15 (A) Negative mg/dL    Specific Gravity, UA >=1.030 1.005 - 1.030    Blood, Urine TRACE (A) Negative    pH, UA 5.5 5.0 - 9.0    Protein, UA Negative Negative mg/dL    Urobilinogen, Urine 0.2 <2.0 E.U./dL    Nitrite, Urine Negative Negative    Leukocyte Esterase, Urine SMALL (A) Negative   Lactic Acid, Plasma   Result Value Ref Range    Lactic Acid 1.0 0.5 - 2.2 mmol/L   Pregnancy, urine   Result Value Ref Range    HCG(Urine) Pregnancy Test NEGATIVE NEGATIVE   Microscopic Urinalysis   Result Value Ref Range    WBC, UA 10-20 (A) 0 - 5 /HPF    RBC, UA NONE 0 - 2 /HPF    Epithelial Cells, UA RARE /HPF    Bacteria, UA RARE (A) None Seen /HPF       Radiology:  No orders to display       ------------------------- NURSING NOTES AND VITALS REVIEWED ---------------------------  Date / Time Roomed:  8/14/2021  5:10 PM  ED Bed Assignment:  06/06    The nursing notes within the ED encounter and vital signs as below have been reviewed.    /83   Pulse 91   Temp 98 °F (36.7 °C) (Oral)   Resp 18   Ht 5' 7\" (1.702 m)   Wt 209 lb (94.8 kg)   LMP 08/16/2011   SpO2 99%   BMI 32.73 kg/m²   Oxygen Saturation Interpretation: Normal      ------------------------------------------ PROGRESS NOTES ------------------------------------------  8:50 AM EDT  I have spoken with the patient and discussed todays results, in addition to providing specific details for the plan of care and counseling regarding the diagnosis and prognosis. Their questions are answered at this time and they are agreeable with the plan. I discussed at length with them reasons for immediate return here for re evaluation. They will followup with their primary care physician by calling their office on Monday.      --------------------------------- ADDITIONAL PROVIDER NOTES ---------------------------------  At this time the patient is without objective evidence of an acute process requiring hospitalization or inpatient management. They have remained hemodynamically stable throughout their entire ED visit and are stable for discharge with outpatient follow-up. The plan has been discussed in detail and they are aware of the specific conditions for emergent return, as well as the importance of follow-up. Discharge Medication List as of 8/14/2021  8:21 PM      START taking these medications    Details   valACYclovir (VALTREX) 1 g tablet Take 1 tablet by mouth 2 times daily for 10 days, Disp-20 tablet, R-0Print             Diagnosis:  1. Herpes simplex vulvovaginitis        Disposition:  Patient's disposition: Discharge to home  Patient's condition is stable.        [address] DO  Resident  08/15/21 [id number]

## 2021-08-14 NOTE — ED TRIAGE NOTES
FIRST PROVIDER CONTACT ASSESSMENT NOTE      Department of Emergency Medicine   Admit Date: No admission date for patient encounter. Chief Complaint: Dysuria (For 3 weeks. Recently tx for UTI without relief), Flank Pain (Right), Vaginitis, and Fatigue      History of Present Illness:   Colin Caballero is a 39 y.o. female who presents to the ED for dysuria, \"rawness\" in the groin, right sided flank pain. Ongoing x 3 weeks. On amoxicillin and bactrim for UTI (rx by urgent care) but symptoms are worsening. Was having intermittent chest pain into the arm. Fatigue as well.  Fever 2 days ago (101).        -----------------END OF FIRST PROVIDER CONTACT ASSESSMENT NOTE--------------  Electronically signed by ALICE Davis   DD: 8/14/21

## 2021-08-18 LAB
C. TRACHOMATIS DNA ,URINE: NEGATIVE
N. GONORRHOEAE DNA, URINE: NEGATIVE
SOURCE: NORMAL

## 2021-08-19 LAB
BLOOD CULTURE, ROUTINE: NORMAL
CULTURE, BLOOD 2: NORMAL

## 2021-09-06 ENCOUNTER — HOSPITAL ENCOUNTER (EMERGENCY)
Age: 46
Discharge: HOME | End: 2021-09-06
Attending: EMERGENCY MEDICINE
Payer: COMMERCIAL

## 2021-09-06 VITALS
SYSTOLIC BLOOD PRESSURE: 136 MMHG | DIASTOLIC BLOOD PRESSURE: 90 MMHG | RESPIRATION RATE: 18 BRPM | OXYGEN SATURATION: 96 % | TEMPERATURE: 97.1 F | HEIGHT: 67 IN | WEIGHT: 214 LBS | BODY MASS INDEX: 33.59 KG/M2 | HEART RATE: 93 BPM

## 2021-09-06 DIAGNOSIS — N39.0 URINARY TRACT INFECTION WITHOUT HEMATURIA, SITE UNSPECIFIED: Primary | ICD-10-CM

## 2021-09-06 LAB
AMORPHOUS: ABNORMAL
BACTERIA: ABNORMAL /HPF
BILIRUBIN URINE: NEGATIVE
BLOOD, URINE: ABNORMAL
CLARITY: CLEAR
COLOR: YELLOW
EPITHELIAL CELLS, UA: ABNORMAL /HPF
GLUCOSE URINE: NEGATIVE MG/DL
KETONES, URINE: NEGATIVE MG/DL
LEUKOCYTE ESTERASE, URINE: NEGATIVE
NITRITE, URINE: POSITIVE
PH UA: 6 (ref 5–9)
PROTEIN UA: NEGATIVE MG/DL
RBC UA: ABNORMAL /HPF (ref 0–2)
RENAL EPITHELIAL, UA: ABNORMAL /HPF
SPECIFIC GRAVITY UA: 1.02 (ref 1–1.03)
UROBILINOGEN, URINE: 0.2 E.U./DL
WBC UA: ABNORMAL /HPF (ref 0–5)

## 2021-09-06 PROCEDURE — 81001 URINALYSIS AUTO W/SCOPE: CPT

## 2021-09-06 PROCEDURE — 87591 N.GONORRHOEAE DNA AMP PROB: CPT

## 2021-09-06 PROCEDURE — 87088 URINE BACTERIA CULTURE: CPT

## 2021-09-06 PROCEDURE — 87491 CHLMYD TRACH DNA AMP PROBE: CPT

## 2021-09-06 PROCEDURE — 87077 CULTURE AEROBIC IDENTIFY: CPT

## 2021-09-06 PROCEDURE — 99282 EMERGENCY DEPT VISIT SF MDM: CPT

## 2021-09-06 PROCEDURE — 87186 SC STD MICRODIL/AGAR DIL: CPT

## 2021-09-06 RX ORDER — IBUPROFEN 200 MG
800 TABLET ORAL EVERY 6 HOURS PRN
COMMUNITY
End: 2021-12-22

## 2021-09-06 RX ORDER — LEVOFLOXACIN 500 MG/1
500 TABLET, FILM COATED ORAL DAILY
Qty: 10 TABLET | Refills: 0 | Status: SHIPPED | OUTPATIENT
Start: 2021-09-06 | End: 2021-09-16

## 2021-09-06 RX ORDER — PHENAZOPYRIDINE HYDROCHLORIDE 100 MG/1
100 TABLET, FILM COATED ORAL 3 TIMES DAILY PRN
Qty: 6 TABLET | Refills: 0 | Status: SHIPPED | OUTPATIENT
Start: 2021-09-06 | End: 2021-09-09

## 2021-09-06 ASSESSMENT — PAIN DESCRIPTION - ONSET: ONSET: ON-GOING

## 2021-09-06 ASSESSMENT — PAIN DESCRIPTION - ORIENTATION: ORIENTATION: LOWER;MID

## 2021-09-06 ASSESSMENT — ENCOUNTER SYMPTOMS
DIARRHEA: 0
ABDOMINAL DISTENTION: 0
SORE THROAT: 0
SINUS PRESSURE: 0
SHORTNESS OF BREATH: 0
NAUSEA: 0
BACK PAIN: 0
EYE PAIN: 0
VOMITING: 0
WHEEZING: 0
EYE REDNESS: 0
EYE DISCHARGE: 0
COUGH: 0

## 2021-09-06 ASSESSMENT — PAIN DESCRIPTION - FREQUENCY: FREQUENCY: CONTINUOUS

## 2021-09-06 ASSESSMENT — PAIN DESCRIPTION - LOCATION: LOCATION: ABDOMEN;BACK

## 2021-09-06 ASSESSMENT — PAIN DESCRIPTION - PAIN TYPE: TYPE: ACUTE PAIN

## 2021-09-06 ASSESSMENT — PAIN SCALES - GENERAL: PAINLEVEL_OUTOF10: 4

## 2021-09-06 ASSESSMENT — PAIN DESCRIPTION - DESCRIPTORS: DESCRIPTORS: ACHING

## 2021-09-06 ASSESSMENT — PAIN DESCRIPTION - PROGRESSION: CLINICAL_PROGRESSION: NOT CHANGED

## 2021-09-06 NOTE — ED PROVIDER NOTES
The history is provided by the patient. Dysuria   This is a recurrent problem. The current episode started 1 to 2 hours ago. The problem occurs every urination. The quality of the pain is described as burning. The pain is moderate. There has been no fever. Associated symptoms include frequency, urgency and flank pain. Pertinent negatives include no chills, no nausea, no vomiting, no discharge and no hematuria. Review of Systems   Constitutional: Negative for chills and fever. HENT: Negative for ear pain, sinus pressure and sore throat. Eyes: Negative for pain, discharge and redness. Respiratory: Negative for cough, shortness of breath and wheezing. Cardiovascular: Negative for chest pain. Gastrointestinal: Negative for abdominal distention, diarrhea, nausea and vomiting. Genitourinary: Positive for dysuria, flank pain, frequency and urgency. Negative for hematuria. Musculoskeletal: Negative for arthralgias and back pain. Skin: Negative for rash and wound. Neurological: Negative for weakness and headaches. Hematological: Negative for adenopathy. All other systems reviewed and are negative. Physical Exam  Vitals and nursing note reviewed. Constitutional:       Appearance: She is well-developed. HENT:      Head: Normocephalic and atraumatic. Right Ear: Hearing and external ear normal.      Left Ear: Hearing and external ear normal.      Nose: Nose normal.      Mouth/Throat:      Pharynx: Uvula midline. Eyes:      General: Lids are normal.      Conjunctiva/sclera: Conjunctivae normal.      Pupils: Pupils are equal, round, and reactive to light. Cardiovascular:      Rate and Rhythm: Normal rate and regular rhythm. Heart sounds: Normal heart sounds. No murmur heard. Pulmonary:      Effort: Pulmonary effort is normal. No respiratory distress. Breath sounds: Normal breath sounds. No wheezing or rales.    Abdominal:      General: Bowel sounds are normal. allergies. -------------------------------------------------- RESULTS -------------------------------------------------  Labs:  Results for orders placed or performed during the hospital encounter of 09/06/21   C.trachomatis N.gonorrhoeae DNA, Urine    Specimen: Urine   Result Value Ref Range    Source Urine    Urinalysis with Microscopic   Result Value Ref Range    Color, UA Yellow Straw/Yellow    Clarity, UA Clear Clear    Glucose, Ur Negative Negative mg/dL    Bilirubin Urine Negative Negative    Ketones, Urine Negative Negative mg/dL    Specific Gravity, UA 1.020 1.005 - 1.030    Blood, Urine MODERATE (A) Negative    pH, UA 6.0 5.0 - 9.0    Protein, UA Negative Negative mg/dL    Urobilinogen, Urine 0.2 <2.0 E.U./dL    Nitrite, Urine POSITIVE (A) Negative    Leukocyte Esterase, Urine Negative Negative    WBC, UA 5-10 (A) 0 - 5 /HPF    RBC, UA 5-10 (A) 0 - 2 /HPF    Epithelial Cells, UA FEW /HPF    Renal Epithelial, UA RARE /HPF    Bacteria, UA MANY (A) None Seen /HPF    Amorphous, UA FEW        Radiology:  No orders to display       ------------------------- NURSING NOTES AND VITALS REVIEWED ---------------------------  Date / Time Roomed:  9/6/2021  8:49 AM  ED Bed Assignment:  03/03    The nursing notes within the ED encounter and vital signs as below have been reviewed. BP (!) 136/90   Pulse 93   Temp 97.1 °F (36.2 °C) (Temporal)   Resp 18   Ht 5' 7\" (1.702 m)   Wt 214 lb (97.1 kg)   LMP 08/16/2011   SpO2 96%   BMI 33.52 kg/m²   Oxygen Saturation Interpretation: Normal      ------------------------------------------ PROGRESS NOTES ------------------------------------------  I have spoken with the patient and discussed todays results, in addition to providing specific details for the plan of care and counseling regarding the diagnosis and prognosis. Their questions are answered at this time and they are agreeable with the plan.  I discussed at length with them reasons for immediate return here for re evaluation. They will followup with primary care by calling their office tomorrow. --------------------------------- ADDITIONAL PROVIDER NOTES ---------------------------------  At this time the patient is without objective evidence of an acute process requiring hospitalization or inpatient management. They have remained hemodynamically stable throughout their entire ED visit and are stable for discharge with outpatient follow-up. The plan has been discussed in detail and they are aware of the specific conditions for emergent return, as well as the importance of follow-up. New Prescriptions    LEVOFLOXACIN (LEVAQUIN) 500 MG TABLET    Take 1 tablet by mouth daily for 10 days    PHENAZOPYRIDINE (PYRIDIUM) 100 MG TABLET    Take 1 tablet by mouth 3 times daily as needed for Pain       Diagnosis:  1. Urinary tract infection without hematuria, site unspecified        Disposition:  Patient's disposition: Discharge to home  Patient's condition is stable.                       Irving Rodriguez MD  09/06/21 [id number]

## 2021-09-08 LAB
ORGANISM: ABNORMAL
URINE CULTURE, ROUTINE: ABNORMAL

## 2021-09-09 LAB
C. TRACHOMATIS DNA ,URINE: NEGATIVE
N. GONORRHOEAE DNA, URINE: NEGATIVE
SOURCE: NORMAL

## 2021-10-14 ENCOUNTER — HOSPITAL ENCOUNTER (EMERGENCY)
Age: 46
Discharge: HOME | End: 2021-10-14
Attending: EMERGENCY MEDICINE
Payer: COMMERCIAL

## 2021-10-14 VITALS
HEART RATE: 98 BPM | WEIGHT: 200 LBS | OXYGEN SATURATION: 99 % | TEMPERATURE: 96.9 F | DIASTOLIC BLOOD PRESSURE: 83 MMHG | HEIGHT: 68 IN | RESPIRATION RATE: 16 BRPM | BODY MASS INDEX: 30.31 KG/M2 | SYSTOLIC BLOOD PRESSURE: 125 MMHG

## 2021-10-14 DIAGNOSIS — Z20.822 SUSPECTED COVID-19 VIRUS INFECTION: Primary | ICD-10-CM

## 2021-10-14 PROCEDURE — U0003 INFECTIOUS AGENT DETECTION BY NUCLEIC ACID (DNA OR RNA); SEVERE ACUTE RESPIRATORY SYNDROME CORONAVIRUS 2 (SARS-COV-2) (CORONAVIRUS DISEASE [COVID-19]), AMPLIFIED PROBE TECHNIQUE, MAKING USE OF HIGH THROUGHPUT TECHNOLOGIES AS DESCRIBED BY CMS-2020-01-R: HCPCS

## 2021-10-14 PROCEDURE — 99282 EMERGENCY DEPT VISIT SF MDM: CPT

## 2021-10-14 PROCEDURE — U0005 INFEC AGEN DETEC AMPLI PROBE: HCPCS

## 2021-10-14 RX ORDER — BROMPHENIRAMINE MALEATE, PSEUDOEPHEDRINE HYDROCHLORIDE, AND DEXTROMETHORPHAN HYDROBROMIDE 2; 30; 10 MG/5ML; MG/5ML; MG/5ML
5 SYRUP ORAL 4 TIMES DAILY PRN
Qty: 120 ML | Refills: 0 | Status: SHIPPED | OUTPATIENT
Start: 2021-10-14 | End: 2021-12-22

## 2021-10-14 ASSESSMENT — ENCOUNTER SYMPTOMS
DIARRHEA: 0
EYE REDNESS: 0
EYE DISCHARGE: 0
BACK PAIN: 0
VOMITING: 0
WHEEZING: 0
RHINORRHEA: 1
NAUSEA: 0
EYE PAIN: 0
SHORTNESS OF BREATH: 0
SINUS PRESSURE: 0
SORE THROAT: 0
COUGH: 1
ABDOMINAL DISTENTION: 0

## 2021-10-14 NOTE — Clinical Note
Ramses Harrell was seen and treated in our emergency department on 10/14/2021. COVID19 virus is suspected. Per the CDC guidelines we recommend home isolation until the following conditions are all met:    1. At least 10 days have passed since symptoms first appeared and  2. At least 24 hours have passed since last fever without the use of fever-reducing medications and  3. Symptoms (e.g., cough, shortness of breath) have improved    If you have any questions or concerns, please don't hesitate to call.     She may return to work/school on 10/25/2021    COVID TEST IS PENDING AT Sahil Waite MD

## 2021-10-14 NOTE — ED PROVIDER NOTES
The history is provided by the patient. Illness   The current episode started 3 to 5 days ago. The onset was sudden. The problem is moderate. Associated symptoms include a fever, congestion, headaches, rhinorrhea, muscle aches and cough. Pertinent negatives include no diarrhea, no nausea, no vomiting, no ear pain, no sore throat, no wheezing, no rash, no eye discharge, no eye pain and no eye redness. Review of Systems   Constitutional: Positive for fever. Negative for chills. HENT: Positive for congestion and rhinorrhea. Negative for ear pain, sinus pressure and sore throat. Eyes: Negative for pain, discharge and redness. Respiratory: Positive for cough. Negative for shortness of breath and wheezing. Cardiovascular: Negative for chest pain. Gastrointestinal: Negative for abdominal distention, diarrhea, nausea and vomiting. Genitourinary: Negative for dysuria and frequency. Musculoskeletal: Negative for arthralgias and back pain. Skin: Negative for rash and wound. Neurological: Positive for headaches. Negative for weakness. Hematological: Negative for adenopathy. All other systems reviewed and are negative. Physical Exam  Vitals and nursing note reviewed. Constitutional:       Appearance: She is well-developed. HENT:      Head: Normocephalic and atraumatic. Right Ear: Tympanic membrane is retracted. Left Ear: Tympanic membrane is retracted. Nose: Mucosal edema and congestion present. Eyes:      Pupils: Pupils are equal, round, and reactive to light. Cardiovascular:      Rate and Rhythm: Normal rate and regular rhythm. Heart sounds: Normal heart sounds. No murmur heard. Pulmonary:      Effort: Pulmonary effort is normal. No respiratory distress. Breath sounds: Normal breath sounds. No wheezing or rales. Abdominal:      General: Bowel sounds are normal.      Palpations: Abdomen is soft. Tenderness: There is no abdominal tenderness. There is no guarding or rebound. Musculoskeletal:      Cervical back: Normal range of motion and neck supple. Skin:     General: Skin is warm and dry. Neurological:      Mental Status: She is alert and oriented to person, place, and time. Cranial Nerves: No cranial nerve deficit. Coordination: Coordination normal.          Procedures     MDM          --------------------------------------------- PAST HISTORY ---------------------------------------------  Past Medical History:  has a past medical history of BRCA1 gene mutation positive, [facility] ([facility] Diabetes mellitus ([facility] Headache(784.0), Hyperlipidemia, Hypertension, Lumbar herniated disc, and Migraine. Past Surgical History:  has a past surgical history that includes Cholecystectomy; Tubal ligation; LEEP; Tonsillectomy; Tibia fracture surgery; Ankle surgery; Breast biopsy; Breast lumpectomy; and Hysterectomy. Social History:  reports that she quit smoking about 6 years ago. She smoked 0.00 packs per day for 0.00 years. She has never used smokeless tobacco. She reports that she does not drink alcohol and does not use drugs. Family History: family history includes Alcohol Abuse in her father; Asthma in her daughter and son; Brain Cancer in her daughter; Cancer in her father, maternal grandfather, maternal grandmother, paternal grandfather, and paternal grandmother; Diabetes in her maternal grandmother and mother; Heart Attack in her father; Hypotension in her father; Kidney Cancer in her maternal grandfather; Porsha Pickup in her father. The patients home medications have been reviewed. Allergies: Patient has no known allergies. -------------------------------------------------- RESULTS -------------------------------------------------  Labs:  No results found for this visit on 10/14/21.     Radiology:  No orders to display       ------------------------- NURSING NOTES AND VITALS REVIEWED

## 2021-10-16 LAB — SARS-COV-2, PCR: NOT DETECTED

## 2021-12-22 ENCOUNTER — APPOINTMENT (OUTPATIENT)
Dept: GENERAL RADIOLOGY | Age: 46
End: 2021-12-22
Payer: COMMERCIAL

## 2021-12-22 ENCOUNTER — HOSPITAL ENCOUNTER (EMERGENCY)
Age: 46
Discharge: HOME | End: 2021-12-22
Attending: EMERGENCY MEDICINE
Payer: COMMERCIAL

## 2021-12-22 VITALS
HEART RATE: 99 BPM | SYSTOLIC BLOOD PRESSURE: 116 MMHG | DIASTOLIC BLOOD PRESSURE: 85 MMHG | HEIGHT: 68 IN | OXYGEN SATURATION: 99 % | RESPIRATION RATE: 16 BRPM | BODY MASS INDEX: 30.01 KG/M2 | TEMPERATURE: 97 F | WEIGHT: 198 LBS

## 2021-12-22 DIAGNOSIS — S20.211A CONTUSION OF RIGHT CHEST WALL, INITIAL ENCOUNTER: Primary | ICD-10-CM

## 2021-12-22 PROCEDURE — 71101 X-RAY EXAM UNILAT RIBS/CHEST: CPT

## 2021-12-22 PROCEDURE — 99283 EMERGENCY DEPT VISIT LOW MDM: CPT

## 2021-12-22 RX ORDER — IBUPROFEN 600 MG/1
600 TABLET ORAL EVERY 8 HOURS PRN
Qty: 30 TABLET | Refills: 0 | Status: SHIPPED | OUTPATIENT
Start: 2021-12-22 | End: 2022-02-20 | Stop reason: SDUPTHER

## 2021-12-22 RX ORDER — TRAMADOL HYDROCHLORIDE 50 MG/1
50 TABLET ORAL EVERY 6 HOURS PRN
Qty: 12 TABLET | Refills: 0 | Status: SHIPPED | OUTPATIENT
Start: 2021-12-22 | End: 2021-12-25

## 2021-12-22 ASSESSMENT — PAIN DESCRIPTION - PAIN TYPE: TYPE: ACUTE PAIN

## 2021-12-22 ASSESSMENT — PAIN DESCRIPTION - DESCRIPTORS: DESCRIPTORS: SHARP

## 2021-12-22 ASSESSMENT — ACTIVITIES OF DAILY LIVING (ADL): EFFECT OF PAIN ON DAILY ACTIVITIES: HURTS TO MOVE.

## 2021-12-22 ASSESSMENT — PAIN SCALES - GENERAL: PAINLEVEL_OUTOF10: 10

## 2021-12-22 ASSESSMENT — PAIN DESCRIPTION - PROGRESSION: CLINICAL_PROGRESSION: NOT CHANGED

## 2021-12-22 ASSESSMENT — PAIN DESCRIPTION - ORIENTATION: ORIENTATION: RIGHT

## 2021-12-22 ASSESSMENT — PAIN DESCRIPTION - FREQUENCY: FREQUENCY: CONTINUOUS

## 2021-12-22 ASSESSMENT — PAIN DESCRIPTION - LOCATION: LOCATION: RIB CAGE

## 2021-12-23 NOTE — ED PROVIDER NOTES
HPI:  12/22/21,   Time: 7:34 PM OFE Hoover is a 42 y.o. female presenting to the ED for right sided chest wall pain after leaning on a table, beginning 3 days ago. The complaint has been persistent, moderate in severity, and worsened by nothing. ROS:   Pertinent positives and negatives are stated within HPI, all other systems reviewed and are negative.  --------------------------------------------- PAST HISTORY ---------------------------------------------  Past Medical History:  has a past medical history of BRCA1 gene mutation positive, [facility] ([facility] Cancer ([facility] Diabetes mellitus ([facility] Headache(784.0), Hyperlipidemia, Hypertension, Lumbar herniated disc, and Migraine. Past Surgical History:  has a past surgical history that includes Cholecystectomy; Tubal ligation; LEEP; Tonsillectomy; Tibia fracture surgery; Ankle surgery; Breast biopsy; Breast lumpectomy; and Hysterectomy. Social History:  reports that she quit smoking about 6 years ago. She smoked 0.00 packs per day for 0.00 years. She has never used smokeless tobacco. She reports that she does not drink alcohol and does not use drugs. Family History: family history includes Alcohol Abuse in her father; Asthma in her daughter and son; Brain Cancer in her daughter; Cancer in her father, maternal grandfather, maternal grandmother, paternal grandfather, and paternal grandmother; Diabetes in her maternal grandmother and mother; Heart Attack in her father; Hypotension in her father; Kidney Cancer in her maternal grandfather; Devonda Penta in her father. The patients home medications have been reviewed. Allergies: Patient has no known allergies. -------------------------------------------------- RESULTS -------------------------------------------------  All laboratory and radiology results have been personally reviewed by myself   LABS:  No results found for this visit on 12/22/21.     RADIOLOGY:  Interpreted by Radiologist.  XR RIBS RIGHT INCLUDE CHEST (MIN 3 VIEWS)   Final Result   No displaced rib fractures or complications related to rib fractures. XR RIBS RIGHT INCLUDE CHEST (MIN 3 VIEWS)    Result Date: 12/22/2021  EXAMINATION: 5 XRAY VIEWS OF THE RIGHT RIBS WITH FRONTAL XRAY VIEW OF THE CHEST 12/22/2021 6:47 pm COMPARISON: Chest series from September 21, 2020 HISTORY: ORDERING SYSTEM PROVIDED HISTORY: pain post injury TECHNOLOGIST PROVIDED HISTORY: Reason for exam:->pain post injury FINDINGS: Adequate and symmetric aeration of the lungs. There are no formed consolidations, pleural effusions, or pneumothoraces. Trachea and central mainstem bronchi appear clear. The cardiomediastinal silhouette and pulmonary vascularity appear within normal limits. Osseous and thoracic soft tissue structures demonstrate no acute findings. No displaced rib fractures. No displaced rib fractures or complications related to rib fractures. ------------------------- NURSING NOTES AND VITALS REVIEWED ---------------------------   The nursing notes within the ED encounter and vital signs as below have been reviewed. /85   Pulse 99   Temp 97 °F (36.1 °C) (Temporal)   Resp 16   Ht 5' 8\" (1.727 m)   Wt 198 lb (89.8 kg)   LMP 08/16/2011   SpO2 99%   BMI 30.11 kg/m²   Oxygen Saturation Interpretation: Normal      ---------------------------------------------------PHYSICAL EXAM--------------------------------------      Constitutional/General: Alert and oriented x3, well appearing, non toxic in NAD  Head: NC/AT  Eyes: PERRL, EOMI  Mouth: Oropharynx clear, handling secretions, no trismus  Neck: Supple, full ROM, no meningeal signs  Pulmonary: Lungs clear to auscultation bilaterally, no wheezes, rales, or rhonchi. Not in respiratory distress  Chest Wall: tender to palpate over right side of chest wall laterally  Cardiovascular:  Regular rate and rhythm, no murmurs, gallops, or rubs.  2+ distal pulses  Abdomen: Soft, non tender, non distended,   Extremities: Moves all extremities x 4. Warm and well perfused  Skin: warm and dry without rash  Neurologic: GCS 15,  Psych: Normal Affect      ------------------------------ ED COURSE/MEDICAL DECISION MAKING----------------------  Medications - No data to display      Medical Decision Making:    Results explained to the patient. Apply ice locally. Follow-up with PCP. Patient's Medications   New Prescriptions    IBUPROFEN (IBU) 600 MG TABLET    Take 1 tablet by mouth every 8 hours as needed for Pain    TRAMADOL (ULTRAM) 50 MG TABLET    Take 1 tablet by mouth every 6 hours as needed for Pain for up to 3 days.    Previous Medications    B-D INS SYRINGE 0.5CC/31GX5/16 31G X 5/16\" 0.5 ML MISC        BLOOD GLUCOSE MONITORING SUPPL (FREESTYLE LITE) BIRDIE        BLOOD GLUCOSE MONITORING SUPPL (ONE TOUCH ULTRA MINI) W/DEVICE KIT    Check blood sugar bid    GLUCOSE BLOOD VI TEST STRIPS (ONE TOUCH ULTRA TEST) STRIP    Check blood sugar 4 times a day    INSULIN GLARGINE (BASAGLAR KWIKPEN) 100 UNIT/ML INJECTION PEN    Inject 37 Units into the skin 2 times daily     INSULIN PEN NEEDLE 32G X 6 MM MISC    1 Device by Does not apply route daily    INSULIN SYRINGE .5CC/29G (AIMSCO INSULIN SYR ULTRA THIN) 29G X 1/2\" 0.5 ML MISC    Use as directed    LISINOPRIL (PRINIVIL;ZESTRIL) 20 MG TABLET    Take 20 mg by mouth daily    METFORMIN (GLUCOPHAGE) 1000 MG TABLET    Take 1,000 mg by mouth 2 times daily (with meals)    OMEPRAZOLE (PRILOSEC) 20 MG DELAYED RELEASE CAPSULE    Take 20 mg by mouth daily    ONE TOUCH LANCETS MISC    Check blood sugar bid    STEGLATRO 5 MG TABS    Take 5 mg by mouth daily     TAMOXIFEN (NOLVADEX) 20 MG TABLET    Take 20 mg by mouth nightly   Modified Medications    No medications on file   Discontinued Medications    BROMPHENIRAMINE-PSEUDOEPHEDRINE-DM 2-30-10 MG/5ML SYRUP    Take 5 mLs by mouth 4 times daily as needed for Congestion or Cough    IBUPROFEN (ADVIL;MOTRIN) 200 MG TABLET    Take 800 mg by mouth every 6 hours as needed for Pain         Counseling: The emergency provider has spoken with the patient and discussed todays results, in addition to providing specific details for the plan of care and counseling regarding the diagnosis and prognosis. Questions are answered at this time and they are agreeable with the plan.      --------------------------------- IMPRESSION AND DISPOSITION ---------------------------------    IMPRESSION  1.  Contusion of right chest wall, initial encounter        DISPOSITION  Disposition: Discharge to home  Patient condition is good          '     Paige Quiñones MD  12/22/21 2006

## 2022-01-04 ENCOUNTER — HOSPITAL ENCOUNTER (EMERGENCY)
Age: 47
Discharge: HOME | End: 2022-01-04
Attending: EMERGENCY MEDICINE
Payer: COMMERCIAL

## 2022-01-04 VITALS
TEMPERATURE: 96.6 F | SYSTOLIC BLOOD PRESSURE: 130 MMHG | DIASTOLIC BLOOD PRESSURE: 80 MMHG | BODY MASS INDEX: 31.02 KG/M2 | WEIGHT: 204 LBS | OXYGEN SATURATION: 97 % | RESPIRATION RATE: 20 BRPM | HEART RATE: 86 BPM

## 2022-01-04 DIAGNOSIS — Z20.822 SUSPECTED COVID-19 VIRUS INFECTION: Primary | ICD-10-CM

## 2022-01-04 PROCEDURE — 99283 EMERGENCY DEPT VISIT LOW MDM: CPT

## 2022-01-04 PROCEDURE — U0005 INFEC AGEN DETEC AMPLI PROBE: HCPCS

## 2022-01-04 PROCEDURE — U0003 INFECTIOUS AGENT DETECTION BY NUCLEIC ACID (DNA OR RNA); SEVERE ACUTE RESPIRATORY SYNDROME CORONAVIRUS 2 (SARS-COV-2) (CORONAVIRUS DISEASE [COVID-19]), AMPLIFIED PROBE TECHNIQUE, MAKING USE OF HIGH THROUGHPUT TECHNOLOGIES AS DESCRIBED BY CMS-2020-01-R: HCPCS

## 2022-01-04 RX ORDER — BROMPHENIRAMINE MALEATE, PSEUDOEPHEDRINE HYDROCHLORIDE, AND DEXTROMETHORPHAN HYDROBROMIDE 2; 30; 10 MG/5ML; MG/5ML; MG/5ML
5 SYRUP ORAL 4 TIMES DAILY PRN
Qty: 120 ML | Refills: 0 | Status: SHIPPED | OUTPATIENT
Start: 2022-01-04 | End: 2022-01-14

## 2022-01-04 RX ORDER — ONDANSETRON 4 MG/1
4 TABLET, ORALLY DISINTEGRATING ORAL EVERY 8 HOURS PRN
Qty: 6 TABLET | Refills: 0 | Status: SHIPPED | OUTPATIENT
Start: 2022-01-04 | End: 2022-01-10

## 2022-01-04 ASSESSMENT — ENCOUNTER SYMPTOMS
SORE THROAT: 0
ABDOMINAL DISTENTION: 0
SINUS PRESSURE: 0
BACK PAIN: 0
NAUSEA: 0
COUGH: 1
EYE DISCHARGE: 0
WHEEZING: 0
VOMITING: 1
SHORTNESS OF BREATH: 0
DIARRHEA: 0
EYE REDNESS: 0
EYE PAIN: 0

## 2022-01-04 ASSESSMENT — PAIN DESCRIPTION - ONSET: ONSET: AWAKENED FROM SLEEP

## 2022-01-04 ASSESSMENT — PAIN SCALES - GENERAL: PAINLEVEL_OUTOF10: 6

## 2022-01-04 ASSESSMENT — PAIN DESCRIPTION - LOCATION: LOCATION: GENERALIZED

## 2022-01-04 ASSESSMENT — PAIN DESCRIPTION - PAIN TYPE: TYPE: ACUTE PAIN

## 2022-01-04 ASSESSMENT — PAIN DESCRIPTION - DESCRIPTORS: DESCRIPTORS: ACHING

## 2022-01-04 NOTE — Clinical Note
Armen David was seen and treated in our emergency department on 1/4/2022. She may return to work on 01/09/2022. REMAIN IN ISOLATION (Pending results): Luan Devlin NEGATIVE result:                                                                                                                                                                                     (result in 2-3 days; return to work/school if negative)       OR     POSITIVE result:  \" 5 days from date symptoms first appeared, mask for 10 days  \" at least 24 hours with no fever (without the use of fever-reducing medication)   \" AND symptoms improve         If you have any questions or concerns, please don't hesitate to call.       Monica Parsons, DO

## 2022-01-04 NOTE — ED PROVIDER NOTES
The history is provided by the patient. Illness   The current episode started yesterday. The onset was gradual. The problem occurs occasionally. The problem has been unchanged. The problem is mild. Associated symptoms include vomiting, congestion, cough and URI. Pertinent negatives include no fever, no diarrhea, no nausea, no ear pain, no headaches, no sore throat, no wheezing, no rash, no eye discharge, no eye pain and no eye redness. She has been less active. She has been eating less than usual. Urine output has been normal. There were sick contacts at home. She has received no recent medical care. Review of Systems   Constitutional: Positive for activity change, appetite change, chills and fatigue. Negative for fever. HENT: Positive for congestion. Negative for ear pain, sinus pressure and sore throat. Eyes: Negative for pain, discharge and redness. Respiratory: Positive for cough. Negative for shortness of breath and wheezing. Cardiovascular: Negative for chest pain. Gastrointestinal: Positive for vomiting. Negative for abdominal distention, diarrhea and nausea. Genitourinary: Negative for dysuria and frequency. Musculoskeletal: Positive for myalgias. Negative for arthralgias and back pain. Skin: Negative for rash and wound. Neurological: Negative for weakness and headaches. Hematological: Negative for adenopathy. Psychiatric/Behavioral: Negative. All other systems reviewed and are negative. Physical Exam  Vitals and nursing note reviewed. Constitutional:       Appearance: She is well-developed. HENT:      Head: Normocephalic and atraumatic. Right Ear: Tympanic membrane normal.      Left Ear: Tympanic membrane normal.      Nose: Congestion and rhinorrhea present. Eyes:      Pupils: Pupils are equal, round, and reactive to light. Cardiovascular:      Rate and Rhythm: Normal rate and regular rhythm. Heart sounds: Normal heart sounds.  No murmur heard.      Pulmonary:      Effort: Pulmonary effort is normal.      Breath sounds: Normal breath sounds. Abdominal:      General: Bowel sounds are normal.      Palpations: Abdomen is soft. Tenderness: There is no abdominal tenderness. There is no guarding or rebound. Musculoskeletal:      Cervical back: Normal range of motion and neck supple. Skin:     General: Skin is warm and dry. Neurological:      Mental Status: She is alert and oriented to person, place, and time. Psychiatric:         Behavior: Behavior normal.         Thought Content: Thought content normal.         Judgment: Judgment normal.        --------------------------------------------- PAST HISTORY ---------------------------------------------  Past Medical History:  has a past medical history of BRCA1 gene mutation positive, [facility] ([facility] Headache(784.0), Hyperlipidemia, Hypertension, Lumbar herniated disc, and Migraine. Past Surgical History:  has a past surgical history that includes Cholecystectomy; Tubal ligation; LEEP; Tonsillectomy; Tibia fracture surgery; Ankle surgery; Breast biopsy; Breast lumpectomy; and Hysterectomy. Social History:  reports that she quit smoking about 7 years ago. She smoked 0.00 packs per day for 0.00 years. She has never used smokeless tobacco. She reports that she does not drink alcohol and does not use drugs. Family History: family history includes Alcohol Abuse in her father; Asthma in her daughter and son; Brain Cancer in her daughter; Cancer in her father, maternal grandfather, maternal grandmother, paternal grandfather, and paternal grandmother; Diabetes in her maternal grandmother and mother; Heart Attack in her father; Hypotension in her father; Kidney Cancer in her maternal grandfather; Saritha Beets in her father. The patients home medications have been reviewed.     Allergies: Patient has no known allergies. -------------------------------------------------- RESULTS -------------------------------------------------  No results found for this visit on 01/04/22. No orders to display       ------------------------- NURSING NOTES AND VITALS REVIEWED ---------------------------   The nursing notes within the ED encounter and vital signs as below have been reviewed. /80   Pulse 86   Temp 96.6 °F (35.9 °C) (Bladder)   Resp 20   Wt 204 lb (92.5 kg)   LMP 08/16/2011   SpO2 97%   BMI 31.02 kg/m²   Oxygen Saturation Interpretation: Normal      ------------------------------------------ PROGRESS NOTES ------------------------------------------   I have spoken with the patient and discussed todays results, in addition to providing specific details for the plan of care and counseling regarding the diagnosis and prognosis. Their questions are answered at this time and they are agreeable with the plan.      --------------------------------- ADDITIONAL PROVIDER NOTES ---------------------------------        This patient is stable for discharge. I have shared the specific conditions for return, as well as the importance of follow-up. IMPRESSION:     1.  Suspected COVID-19 virus infection      Patient's Medications   New Prescriptions    BROMPHENIRAMINE-PSEUDOEPHEDRINE-DM 2-30-10 MG/5ML SYRUP    Take 5 mLs by mouth 4 times daily as needed for Congestion or Cough    ONDANSETRON (ZOFRAN ODT) 4 MG DISINTEGRATING TABLET    Take 1 tablet by mouth every 8 hours as needed for Nausea or Vomiting   Previous Medications    B-D INS SYRINGE 0.5CC/31GX5/16 31G X 5/16\" 0.5 ML MISC        BLOOD GLUCOSE MONITORING SUPPL (FREESTYLE LITE) BIRDIE        BLOOD GLUCOSE MONITORING SUPPL (ONE TOUCH ULTRA MINI) W/DEVICE KIT    Check blood sugar bid    GLUCOSE BLOOD VI TEST STRIPS (ONE TOUCH ULTRA TEST) STRIP    Check blood sugar 4 times a day    IBUPROFEN (IBU) 600 MG TABLET    Take 1 tablet by mouth every 8 hours as needed for Pain    INSULIN GLARGINE (BASAGLAR KWIKPEN) 100 UNIT/ML INJECTION PEN    Inject 37 Units into the skin 2 times daily     INSULIN PEN NEEDLE 32G X 6 MM MISC    1 Device by Does not apply route daily    INSULIN SYRINGE .5CC/29G (AIMSCO INSULIN SYR ULTRA THIN) 29G X 1/2\" 0.5 ML MISC    Use as directed    LISINOPRIL (PRINIVIL;ZESTRIL) 20 MG TABLET    Take 20 mg by mouth daily    METFORMIN (GLUCOPHAGE) 1000 MG TABLET    Take 1,000 mg by mouth 2 times daily (with meals)    OMEPRAZOLE (PRILOSEC) 20 MG DELAYED RELEASE CAPSULE    Take 20 mg by mouth daily    ONE TOUCH LANCETS MISC    Check blood sugar bid    STEGLATRO 5 MG TABS    Take 5 mg by mouth daily     TAMOXIFEN (NOLVADEX) 20 MG TABLET    Take 20 mg by mouth nightly   Modified Medications    No medications on file   Discontinued Medications    No medications on file         Procedures     [facility]                  Katie Flores DO  01/04/22 [id number]

## 2022-01-05 ENCOUNTER — CARE COORDINATION (OUTPATIENT)
Dept: CARE COORDINATION | Age: 47
End: 2022-01-05

## 2022-01-05 LAB — SARS-COV-2, PCR: NOT DETECTED

## 2022-01-05 NOTE — CARE COORDINATION
Date/Time:  1/5/2022 9:56 AM  Attempted to reach patient by telephone. Call within 2 business days of discharge: Yes Left HIPPA compliant message requesting a return call. Will attempt to reach patient again.

## 2022-01-06 NOTE — CARE COORDINATION
Patient contacted regarding COVID-19 risk, exposure, diagnosis, pulse oximeter ordered at discharge and monoclonal antibody infusion follow up. Discussed COVID-19 related testing which was available at this time. Test results were negative. Patient informed of results, if available? Yes. LPN Care Coordinator contacted the patient by telephone to perform post discharge assessment. Call within 2 business days of discharge: Yes. Verified name and  with patient as identifiers. Provided introduction to self, and explanation of the CTN/ACM role, and reason for call due to risk factors for infection and/or exposure to COVID-19. Symptoms reviewed with patient who verbalized the following symptoms: cough. Due to no new or worsening symptoms encounter was not routed to provider for escalation. Discussed follow-up appointments. If no appointment was previously scheduled, appointment scheduling offered: No.  [facility] follow up appointment(s): No future appointments. Non-[facility] follow up appointment(s): pt states she is still not feeling well and is to follow up with pcp reminded pt to return to er if symptoms persist     Non-face-to-face services provided:  Obtained and reviewed discharge summary and/or continuity of care documents     Advance Care Planning:   Does patient have an Advance Directive:  not on file. Educated patient about risk for severe COVID-19 due to risk factors according to CDC guidelines. LPN CC reviewed discharge instructions, medical action plan and red flag symptoms with the patient who verbalized understanding. Discussed COVID vaccination status: Yes. Education provided on COVID-19 vaccination as appropriate. Discussed exposure protocols and quarantine with CDC Guidelines. Patient was given an opportunity to verbalize any questions and concerns and agrees to contact LPN CC or health care provider for questions related to their healthcare.     Reviewed and educated patient on any new and changed medications related to discharge diagnosis     Was patient discharged with a pulse oximeter? No Discussed and confirmed pulse oximeter discharge instructions and when to notify provider or seek emergency care. LPN CC provided contact information. No further follow-up call identified based on severity of symptoms and risk factors.

## 2022-02-20 ENCOUNTER — HOSPITAL ENCOUNTER (EMERGENCY)
Age: 47
Discharge: HOME | End: 2022-02-20
Attending: EMERGENCY MEDICINE
Payer: COMMERCIAL

## 2022-02-20 VITALS
TEMPERATURE: 96.6 F | HEART RATE: 74 BPM | DIASTOLIC BLOOD PRESSURE: 78 MMHG | RESPIRATION RATE: 18 BRPM | OXYGEN SATURATION: 100 % | SYSTOLIC BLOOD PRESSURE: 127 MMHG

## 2022-02-20 DIAGNOSIS — S00.411A ABRASION OF RIGHT EAR CANAL, INITIAL ENCOUNTER: ICD-10-CM

## 2022-02-20 DIAGNOSIS — L03.031 CELLULITIS OF GREAT TOE OF RIGHT FOOT: Primary | ICD-10-CM

## 2022-02-20 PROCEDURE — 99283 EMERGENCY DEPT VISIT LOW MDM: CPT

## 2022-02-20 RX ORDER — CEPHALEXIN 500 MG/1
500 CAPSULE ORAL 4 TIMES DAILY
Qty: 40 CAPSULE | Refills: 0 | Status: SHIPPED | OUTPATIENT
Start: 2022-02-20 | End: 2022-03-02

## 2022-02-20 RX ORDER — SULFAMETHOXAZOLE AND TRIMETHOPRIM 800; 160 MG/1; MG/1
TABLET ORAL
Qty: 21 TABLET | Refills: 0 | Status: SHIPPED | OUTPATIENT
Start: 2022-02-20 | End: 2022-03-02

## 2022-02-20 RX ORDER — IBUPROFEN 600 MG/1
600 TABLET ORAL EVERY 6 HOURS PRN
Qty: 40 TABLET | Refills: 1 | Status: SHIPPED | OUTPATIENT
Start: 2022-02-20 | End: 2022-05-12

## 2022-02-20 ASSESSMENT — ENCOUNTER SYMPTOMS
EYE REDNESS: 0
CONSTIPATION: 0
DIARRHEA: 0
BACK PAIN: 0
SHORTNESS OF BREATH: 0
COUGH: 0
EYE PAIN: 0
VOMITING: 0
ABDOMINAL PAIN: 0
COLOR CHANGE: 1
SORE THROAT: 0
WHEEZING: 0
NAUSEA: 0
SINUS PRESSURE: 0

## 2022-02-20 ASSESSMENT — PAIN DESCRIPTION - ONSET: ONSET: AWAKENED FROM SLEEP

## 2022-02-20 ASSESSMENT — PAIN DESCRIPTION - LOCATION: LOCATION: LEG

## 2022-02-20 ASSESSMENT — PAIN - FUNCTIONAL ASSESSMENT
PAIN_FUNCTIONAL_ASSESSMENT: 0-10
PAIN_FUNCTIONAL_ASSESSMENT: 0-10

## 2022-02-20 ASSESSMENT — PAIN DESCRIPTION - PAIN TYPE: TYPE: ACUTE PAIN

## 2022-02-20 ASSESSMENT — PAIN DESCRIPTION - ORIENTATION: ORIENTATION: LEFT;POSTERIOR;LOWER;UPPER

## 2022-02-20 ASSESSMENT — PAIN DESCRIPTION - DESCRIPTORS: DESCRIPTORS: SHOOTING

## 2022-02-20 ASSESSMENT — PAIN SCALES - GENERAL
PAINLEVEL_OUTOF10: 6
PAINLEVEL_OUTOF10: 6

## 2022-02-20 NOTE — ED PROVIDER NOTES
Chief Complaint   Patient presents with    Foot Problem     Today 8 am awakened with right great toe swelling/redness/discoloration purple. Denies injuryor toe pain. c/o pain \"going down back of leg\". Hx diabetic neuropathy.  Ear Drainage     Onset 2 days ago right ear \"blood drainage\". No noticed drainage today.   : had concerns including Foot Problem (Today 8 am awakened with right great toe swelling/redness/discoloration purple. Denies injuryor toe pain. c/o pain \"going down back of leg\". Hx diabetic neuropathy.) and Ear Drainage (Onset 2 days ago right ear \"blood drainage\". No noticed drainage today. ). HPI    Review of Systems   Constitutional: Negative for chills and fever. HENT: Positive for ear discharge (Blood appreciated from right ear canal previous 2 days. ). Negative for congestion, ear pain, sinus pressure and sore throat. Eyes: Negative for pain and redness. Respiratory: Negative for cough, shortness of breath and wheezing. Cardiovascular: Negative for chest pain. Gastrointestinal: Negative for abdominal pain, constipation, diarrhea, nausea and vomiting. Genitourinary: Negative for dysuria, frequency and urgency. Musculoskeletal: Negative for arthralgias and back pain. Skin: Positive for color change (Swelling with purple discoloration right great toe.). Negative for rash and wound. Neurological: Negative for weakness and headaches. Hematological: Negative for adenopathy. All other systems reviewed and are negative. Physical Exam  Vitals and nursing note reviewed. Constitutional:       Appearance: She is well-developed. HENT:      Head: Normocephalic and atraumatic. Right Ear: Tympanic membrane and external ear normal. A foreign body (Dried blood noted floor of right ear canal, no active bleeding or erythema.) is present.       Left Ear: Tympanic membrane, ear canal and external ear normal.   Eyes:      Pupils: Pupils are equal, round, and reactive to light. Cardiovascular:      Rate and Rhythm: Normal rate and regular rhythm. Heart sounds: Normal heart sounds. No murmur heard. Pulmonary:      Effort: Pulmonary effort is normal.      Breath sounds: Normal breath sounds. Abdominal:      General: Bowel sounds are normal.      Palpations: Abdomen is soft. Tenderness: There is no abdominal tenderness. There is no guarding or rebound. Musculoskeletal:      Cervical back: Normal range of motion and neck supple. Right lower leg: Tenderness (Generalized posterior right calf. Patient appreciating radicular issues right lower extremity that she believes is \"coming from her right great toe\". ) present. No swelling. No edema. Left lower leg: Normal. No swelling or tenderness. No edema. Right foot: Swelling (With erythema and warmth primarily right great toe.) present. Left foot: Normal.   Skin:     General: Skin is warm and dry. Neurological:      Mental Status: She is alert and oriented to person, place, and time. Procedures    MDM            --------------------------------------------- PAST HISTORY ---------------------------------------------  Past Medical History:  has a past medical history of BRCA1 gene mutation positive, [facility] ([facility] Cancer ([facility] Diabetes mellitus ([facility] Headache(784.0), Hyperlipidemia, Hypertension, Lumbar herniated disc, and Migraine. Past Surgical History:  has a past surgical history that includes Cholecystectomy; Tubal ligation; LEEP; Tonsillectomy; Tibia fracture surgery; Ankle surgery; Breast biopsy; Breast lumpectomy; and Hysterectomy. Social History:  reports that she quit smoking about 7 years ago. She smoked 0.00 packs per day for 0.00 years. She has never used smokeless tobacco. She reports that she does not drink alcohol and does not use drugs.     Family History: family history includes Alcohol Abuse in her father; Asthma in her daughter and son; Norrine Blight in her daughter; Cancer in her father, maternal grandfather, maternal grandmother, paternal grandfather, and paternal grandmother; Diabetes in her maternal grandmother and mother; Heart Attack in her father; Hypotension in her father; Kidney Cancer in her maternal grandfather; Dewaine Quivers in her father. The patients home medications have been reviewed. Allergies: Patient has no known allergies. -------------------------------------------------- RESULTS -------------------------------------------------  No results found for this visit on 02/20/22. No orders to display       ------------------------- NURSING NOTES AND VITALS REVIEWED ---------------------------   The nursing notes within the ED encounter and vital signs as below have been reviewed. /78   Pulse 74   Temp 96.6 °F (35.9 °C) (Temporal)   Resp 18   LMP 08/16/2011   SpO2 100%   Oxygen Saturation Interpretation: Normal      ------------------------------------------ PROGRESS NOTES ------------------------------------------   I have spoken with the patient and discussed todays results, in addition to providing specific details for the plan of care and counseling regarding the diagnosis and prognosis. Their questions are answered at this time and they are agreeable with the plan. Clinical impressions/discharge diagnoses: #1 Cellulitis of great toe of right foot, #2 Abrasion of right ear canal, initial encounter. --------------------------------- ADDITIONAL PROVIDER NOTES ---------------------------------     This patient is stable for discharge. I have shared the specific conditions for return, as well as the importance of follow-up.           Carolyn Hickey,   02/20/22 1042

## 2022-04-20 ENCOUNTER — HOSPITAL ENCOUNTER (EMERGENCY)
Age: 47
Discharge: HOME | End: 2022-04-20
Attending: STUDENT IN AN ORGANIZED HEALTH CARE EDUCATION/TRAINING PROGRAM
Payer: COMMERCIAL

## 2022-04-20 VITALS
OXYGEN SATURATION: 100 % | BODY MASS INDEX: 29.82 KG/M2 | HEIGHT: 67 IN | DIASTOLIC BLOOD PRESSURE: 88 MMHG | WEIGHT: 190 LBS | SYSTOLIC BLOOD PRESSURE: 140 MMHG | TEMPERATURE: 97.7 F | HEART RATE: 91 BPM | RESPIRATION RATE: 16 BRPM

## 2022-04-20 DIAGNOSIS — R63.4 WEIGHT LOSS: ICD-10-CM

## 2022-04-20 DIAGNOSIS — N39.0 COMPLICATED UTI (URINARY TRACT INFECTION): Primary | ICD-10-CM

## 2022-04-20 DIAGNOSIS — R73.09 ELEVATED GLUCOSE: ICD-10-CM

## 2022-04-20 LAB
BACTERIA: ABNORMAL /HPF
BASOPHILS ABSOLUTE: 0.06 E9/L (ref 0–0.2)
BASOPHILS RELATIVE PERCENT: 0.5 % (ref 0–2)
BILIRUBIN URINE: NEGATIVE
BLOOD, URINE: ABNORMAL
CLARITY: CLEAR
CO2: 27 MMOL/L (ref 22–29)
COLOR: YELLOW
EOSINOPHILS ABSOLUTE: 0.12 E9/L (ref 0.05–0.5)
EOSINOPHILS RELATIVE PERCENT: 1 % (ref 0–6)
EPITHELIAL CELLS, UA: ABNORMAL /HPF
GFR AFRICAN AMERICAN: >60
GFR NON-AFRICAN AMERICAN: >60 ML/MIN/1.73
GLUCOSE BLD-MCNC: 202 MG/DL (ref 74–99)
GLUCOSE URINE: NEGATIVE MG/DL
HCG(URINE) PREGNANCY TEST: NEGATIVE
HCT VFR BLD CALC: 42.1 % (ref 34–48)
HEMOGLOBIN: 13.9 G/DL (ref 11.5–15.5)
IMMATURE GRANULOCYTES #: 0.05 E9/L
IMMATURE GRANULOCYTES %: 0.4 % (ref 0–5)
KETONES, URINE: NEGATIVE MG/DL
LEUKOCYTE ESTERASE, URINE: NEGATIVE
LYMPHOCYTES ABSOLUTE: 4.08 E9/L (ref 1.5–4)
LYMPHOCYTES RELATIVE PERCENT: 32.6 % (ref 20–42)
MCH RBC QN AUTO: 29.6 PG (ref 26–35)
MCHC RBC AUTO-ENTMCNC: 33 % (ref 32–34.5)
MCV RBC AUTO: 89.6 FL (ref 80–99.9)
MONOCYTES ABSOLUTE: 0.46 E9/L (ref 0.1–0.95)
MONOCYTES RELATIVE PERCENT: 3.7 % (ref 2–12)
NEUTROPHILS ABSOLUTE: 7.73 E9/L (ref 1.8–7.3)
NEUTROPHILS RELATIVE PERCENT: 61.8 % (ref 43–80)
NITRITE, URINE: POSITIVE
PDW BLD-RTO: 13.2 FL (ref 11.5–15)
PERFORMED ON: ABNORMAL
PH UA: 5.5 (ref 5–9)
PLATELET # BLD: 363 E9/L (ref 130–450)
PMV BLD AUTO: 10.6 FL (ref 7–12)
POC ANION GAP: 9 MMOL/L (ref 7–16)
POC BUN: 6 MG/DL (ref 8–23)
POC CHLORIDE: 105 MMOL/L (ref 100–108)
POC CREATININE: 0.6 MG/DL (ref 0.5–1)
POC POTASSIUM: 4 MMOL/L (ref 3.5–5)
POC SODIUM: 141 MMOL/L (ref 132–146)
PROTEIN UA: NEGATIVE MG/DL
RBC # BLD: 4.7 E12/L (ref 3.5–5.5)
RBC UA: ABNORMAL /HPF (ref 0–2)
SPECIFIC GRAVITY UA: 1.02 (ref 1–1.03)
UROBILINOGEN, URINE: 0.2 E.U./DL
WBC # BLD: 12.5 E9/L (ref 4.5–11.5)
WBC UA: ABNORMAL /HPF (ref 0–5)

## 2022-04-20 PROCEDURE — 36415 COLL VENOUS BLD VENIPUNCTURE: CPT

## 2022-04-20 PROCEDURE — 85025 COMPLETE CBC W/AUTO DIFF WBC: CPT

## 2022-04-20 PROCEDURE — 6360000002 HC RX W HCPCS: Performed by: STUDENT IN AN ORGANIZED HEALTH CARE EDUCATION/TRAINING PROGRAM

## 2022-04-20 PROCEDURE — 81001 URINALYSIS AUTO W/SCOPE: CPT

## 2022-04-20 PROCEDURE — 82947 ASSAY GLUCOSE BLOOD QUANT: CPT

## 2022-04-20 PROCEDURE — 96374 THER/PROPH/DIAG INJ IV PUSH: CPT

## 2022-04-20 PROCEDURE — 87077 CULTURE AEROBIC IDENTIFY: CPT

## 2022-04-20 PROCEDURE — 84520 ASSAY OF UREA NITROGEN: CPT

## 2022-04-20 PROCEDURE — 99284 EMERGENCY DEPT VISIT MOD MDM: CPT

## 2022-04-20 PROCEDURE — 2580000003 HC RX 258: Performed by: STUDENT IN AN ORGANIZED HEALTH CARE EDUCATION/TRAINING PROGRAM

## 2022-04-20 PROCEDURE — 82565 ASSAY OF CREATININE: CPT

## 2022-04-20 PROCEDURE — 81025 URINE PREGNANCY TEST: CPT

## 2022-04-20 PROCEDURE — 87088 URINE BACTERIA CULTURE: CPT

## 2022-04-20 PROCEDURE — 80051 ELECTROLYTE PANEL: CPT

## 2022-04-20 RX ORDER — SULFAMETHOXAZOLE AND TRIMETHOPRIM 800; 160 MG/1; MG/1
1 TABLET ORAL 2 TIMES DAILY
Qty: 28 TABLET | Refills: 0 | Status: SHIPPED | OUTPATIENT
Start: 2022-04-20 | End: 2022-05-04

## 2022-04-20 RX ORDER — ONDANSETRON 2 MG/ML
4 INJECTION INTRAMUSCULAR; INTRAVENOUS ONCE
Status: COMPLETED | OUTPATIENT
Start: 2022-04-20 | End: 2022-04-20

## 2022-04-20 RX ORDER — 0.9 % SODIUM CHLORIDE 0.9 %
1000 INTRAVENOUS SOLUTION INTRAVENOUS ONCE
Status: COMPLETED | OUTPATIENT
Start: 2022-04-20 | End: 2022-04-20

## 2022-04-20 RX ADMIN — ONDANSETRON 4 MG: 2 INJECTION INTRAMUSCULAR; INTRAVENOUS at 15:06

## 2022-04-20 RX ADMIN — SODIUM CHLORIDE 1000 ML: 9 INJECTION, SOLUTION INTRAVENOUS at 15:06

## 2022-04-20 ASSESSMENT — PAIN - FUNCTIONAL ASSESSMENT: PAIN_FUNCTIONAL_ASSESSMENT: NONE - DENIES PAIN

## 2022-04-20 ASSESSMENT — ENCOUNTER SYMPTOMS
EYE REDNESS: 0
DIARRHEA: 1
BACK PAIN: 0
EYE PAIN: 0
SHORTNESS OF BREATH: 0
ABDOMINAL PAIN: 1

## 2022-04-20 NOTE — ED PROVIDER NOTES
and reactive to light. Cardiovascular:      Rate and Rhythm: Normal rate and regular rhythm. Pulses: Normal pulses. Heart sounds: Normal heart sounds. Pulmonary:      Effort: Pulmonary effort is normal. No respiratory distress. Breath sounds: Normal breath sounds. Abdominal:      General: Bowel sounds are normal. There is no distension. Tenderness: There is abdominal tenderness (Mild suprapubic tenderness to palpation, right CVA tenderness). Musculoskeletal:         General: Normal range of motion. Cervical back: Normal range of motion and neck supple. Skin:     General: Skin is warm and dry. Capillary Refill: Capillary refill takes less than 2 seconds. Neurological:      General: No focal deficit present. Mental Status: She is alert. Procedures     MDM   Patient here with abdominal pain, CVA tenderness and diarrhea. She is also having weight loss chronic fatigue decreased p.o. intake over the last few months. Recommended patient go to the emergency department for work-up since our equipment and testing is limited here. Patient requesting that we do what we can here as she does not want to go to the emergency department at this time. I explained to patient that we may be missing some diagnoses and possible illnesses/emergencies. Patient understanding of this still would like work-up here in urgent care. Urinalysis, CBC, point-of-care chemistry and IV fluids as well as IV Zofran ordered. No acute electrolyte abnormalities noted, normal renal function. No leukocytosis. Urinalysis is positive for nitrites and 5-10 WBC. Looking back at patient's old cultures she has actually grown ESBL in her urine. Back in August of 2021 urine was resistant to Bactrim but sensitive to nitrofurantoin. And in September 2021 her urine was sensitive to Bactrim and sensitive to nitrofurantoin as well.   However she is having flank tenderness and nitrofurantoin does not have good coverage outside of the bladder. We will try Bactrim at this time since her last culture was showing sensitivity to that medication. Also considered giving patient one-time dose of IV gentamicin followed by Bactrim since she was sensitive to gentamicin previously however here at the urgent care we do not have this medication. I discussed with the patient that this may not work and she may need to have IV antibiotics. At this time she still wants to trial outpatient therapy. She does not want to go to the emergency department. I warned her of potential complications including worsening infection, pyelonephritis, acute kidney injury, sepsis. Patient is understanding of this she wants to go home at this time. Her vital signs are stable here she is not tachycardic and she is afebrile. Strict return precautions were discussed with her including if symptoms are persisting for the next couple of days that she needs to go back to the emergency department and if she starts to develop fevers she needs to go to the emergency department as well. Patient understanding of all of these recommendations she has mental capacity to make decisions. She still needs to follow-up closely with her primary care physician for her chronic fatigue and weight loss as well. Recommended close follow-up with primary care physician in the next day. ED Course as of 04/20/22 1553   Wed Apr 20, 2022   1454 In January of this year patient weighed 204 pounds. Today she weighs 190 pounds. [FG]      ED Course User Index  [FG] Valladares Gali, DO          --------------------------------------------- PAST HISTORY ---------------------------------------------  Past Medical History:  has a past medical history of BRCA1 gene mutation positive, [facility] ([facility] ([facility] Headache(784.0), Hyperlipidemia, Hypertension, Lumbar herniated disc, and Migraine.     Past Surgical History:  has a past surgical history that includes Cholecystectomy; Tubal ligation; LEEP; Tonsillectomy; Tibia fracture surgery; Ankle surgery; Breast biopsy; Breast lumpectomy; and Hysterectomy. Social History:  reports that she quit smoking about 7 years ago. She smoked 0.00 packs per day for 0.00 years. She has never used smokeless tobacco. She reports that she does not drink alcohol and does not use drugs. Family History: family history includes Alcohol Abuse in her father; Asthma in her daughter and son; Brain Cancer in her daughter; Cancer in her father, maternal grandfather, maternal grandmother, paternal grandfather, and paternal grandmother; Diabetes in her maternal grandmother and mother; Heart Attack in her father; Hypotension in her father; Kidney Cancer in her maternal grandfather; Myrtie Plano in her father. The patients home medications have been reviewed. Allergies: Patient has no known allergies.     -------------------------------------------------- RESULTS -------------------------------------------------  Labs:  Results for orders placed or performed during the hospital encounter of 04/20/22   CBC with Auto Differential   Result Value Ref Range    WBC 12.5 (H) 4.5 - 11.5 E9/L    RBC 4.70 3.50 - 5.50 E12/L    Hemoglobin 13.9 11.5 - 15.5 g/dL    Hematocrit 42.1 34.0 - 48.0 %    MCV 89.6 80.0 - 99.9 fL    MCH 29.6 26.0 - 35.0 pg    MCHC 33.0 32.0 - 34.5 %    RDW 13.2 11.5 - 15.0 fL    Platelets 942 802 - 704 E9/L    MPV 10.6 7.0 - 12.0 fL    Neutrophils % 61.8 43.0 - 80.0 %    Immature Granulocytes % 0.4 0.0 - 5.0 %    Lymphocytes % 32.6 20.0 - 42.0 %    Monocytes % 3.7 2.0 - 12.0 %    Eosinophils % 1.0 0.0 - 6.0 %    Basophils % 0.5 0.0 - 2.0 %    Neutrophils Absolute 7.73 (H) 1.80 - 7.30 E9/L    Immature Granulocytes # 0.05 E9/L    Lymphocytes Absolute 4.08 (H) 1.50 - 4.00 E9/L    Monocytes Absolute 0.46 0.10 - 0.95 E9/L    Eosinophils Absolute 0.12 0.05 - 0.50 E9/L    Basophils Absolute 0.06 0.00 - 0.20 E9/L   Urinalysis with Microscopic   Result Value Ref Range    Color, UA Yellow Straw/Yellow    Clarity, UA Clear Clear    Glucose, Ur Negative Negative mg/dL    Bilirubin Urine Negative Negative    Ketones, Urine Negative Negative mg/dL    Specific Gravity, UA 1.025 1.005 - 1.030    Blood, Urine SMALL (A) Negative    pH, UA 5.5 5.0 - 9.0    Protein, UA Negative Negative mg/dL    Urobilinogen, Urine 0.2 <2.0 E.U./dL    Nitrite, Urine POSITIVE (A) Negative    Leukocyte Esterase, Urine Negative Negative    WBC, UA 5-10 (A) 0 - 5 /HPF    RBC, UA 2-5 0 - 2 /HPF    Epithelial Cells, UA MODERATE /HPF    Bacteria, UA MODERATE (A) None Seen /HPF   Pregnancy, urine   Result Value Ref Range    HCG(Urine) Pregnancy Test NEGATIVE NEGATIVE   POCT Venous   Result Value Ref Range    POC Sodium 141 132 - 146 mmol/L    POC Potassium 4.0 3.5 - 5.0 mmol/L    POC Chloride 105 100 - 108 mmol/L    CO2 27 22 - 29 mmol/L    POC Anion Gap 9 7 - 16 mmol/L    POC Glucose 202 (H) 74 - 99 mg/dl    POC BUN 6 (L) 8 - 23 mg/dL    POC Creatinine 0.6 0.5 - 1.0 mg/dL    GFR Non-African American >60 >=60 mL/min/1.73    GFR  >60     Performed on SEE BELOW        Radiology:  No orders to display       ------------------------- NURSING NOTES AND VITALS REVIEWED ---------------------------  Date / Time Roomed:  4/20/2022  2:26 PM  ED Bed Assignment:  01/01    The nursing notes within the ED encounter and vital signs as below have been reviewed. BP (!) 140/88   Pulse 91   Temp 97.7 °F (36.5 °C) (Temporal)   Resp 16   Ht 5' 7\" (1.702 m)   Wt 190 lb (86.2 kg)   LMP 08/16/2011   SpO2 100%   BMI 29.76 kg/m²   Oxygen Saturation Interpretation: Normal        --------------------------------- ADDITIONAL PROVIDER NOTES ---------------------------------  At this time the patient is without objective evidence of an acute process requiring hospitalization or inpatient management.   They have remained hemodynamically stable throughout their entire ED visit and are stable for discharge with outpatient follow-up. The plan has been discussed in detail and they are aware of the specific conditions for emergent return, as well as the importance of follow-up. New Prescriptions    SULFAMETHOXAZOLE-TRIMETHOPRIM (BACTRIM DS;SEPTRA DS) 800-160 MG PER TABLET    Take 1 tablet by mouth 2 times daily for 14 days       Diagnosis:  1. Complicated UTI (urinary tract infection)    2. Weight loss    3. Elevated glucose        Disposition:  Patient's disposition: Discharge to home  Patient's condition is stable.        Dustin Peres,   Resident  04/20/22 3435

## 2022-04-24 LAB
ORGANISM: ABNORMAL
URINE CULTURE, ROUTINE: ABNORMAL

## 2022-05-05 ENCOUNTER — HOSPITAL ENCOUNTER (EMERGENCY)
Age: 47
Discharge: HOME | End: 2022-05-05
Attending: EMERGENCY MEDICINE
Payer: COMMERCIAL

## 2022-05-05 ENCOUNTER — APPOINTMENT (OUTPATIENT)
Dept: CT IMAGING | Age: 47
End: 2022-05-05
Payer: COMMERCIAL

## 2022-05-05 VITALS
OXYGEN SATURATION: 99 % | HEART RATE: 89 BPM | TEMPERATURE: 97.1 F | RESPIRATION RATE: 18 BRPM | SYSTOLIC BLOOD PRESSURE: 127 MMHG | DIASTOLIC BLOOD PRESSURE: 79 MMHG

## 2022-05-05 DIAGNOSIS — N20.0 RIGHT NEPHROLITHIASIS: ICD-10-CM

## 2022-05-05 DIAGNOSIS — N39.0 URINARY TRACT INFECTION WITHOUT HEMATURIA, SITE UNSPECIFIED: ICD-10-CM

## 2022-05-05 DIAGNOSIS — R10.9 ABDOMINAL PAIN, UNSPECIFIED ABDOMINAL LOCATION: Primary | ICD-10-CM

## 2022-05-05 LAB
ALBUMIN SERPL-MCNC: 4.1 G/DL (ref 3.5–5.2)
ALP BLD-CCNC: 108 U/L (ref 35–104)
ALT SERPL-CCNC: 11 U/L (ref 0–32)
ANION GAP SERPL CALCULATED.3IONS-SCNC: 12 MMOL/L (ref 7–16)
AST SERPL-CCNC: 10 U/L (ref 0–31)
BACTERIA: ABNORMAL /HPF
BASOPHILS ABSOLUTE: 0.07 E9/L (ref 0–0.2)
BASOPHILS RELATIVE PERCENT: 0.7 % (ref 0–2)
BILIRUB SERPL-MCNC: <0.2 MG/DL (ref 0–1.2)
BILIRUBIN URINE: NEGATIVE
BLOOD, URINE: NEGATIVE
BUN BLDV-MCNC: 7 MG/DL (ref 6–20)
CALCIUM SERPL-MCNC: 9.1 MG/DL (ref 8.6–10.2)
CHLORIDE BLD-SCNC: 100 MMOL/L (ref 98–107)
CLARITY: CLEAR
CO2: 22 MMOL/L (ref 22–29)
COLOR: YELLOW
CREAT SERPL-MCNC: 0.5 MG/DL (ref 0.5–1)
EOSINOPHILS ABSOLUTE: 0.1 E9/L (ref 0.05–0.5)
EOSINOPHILS RELATIVE PERCENT: 1 % (ref 0–6)
EPITHELIAL CELLS, UA: ABNORMAL /HPF
GFR AFRICAN AMERICAN: >60
GFR NON-AFRICAN AMERICAN: >60 ML/MIN/1.73
GLUCOSE BLD-MCNC: 332 MG/DL (ref 74–99)
GLUCOSE URINE: 500 MG/DL
HCT VFR BLD CALC: 43.3 % (ref 34–48)
HEMOGLOBIN: 14.1 G/DL (ref 11.5–15.5)
IMMATURE GRANULOCYTES #: 0.06 E9/L
IMMATURE GRANULOCYTES %: 0.6 % (ref 0–5)
KETONES, URINE: NEGATIVE MG/DL
LEUKOCYTE ESTERASE, URINE: NEGATIVE
LYMPHOCYTES ABSOLUTE: 3.43 E9/L (ref 1.5–4)
LYMPHOCYTES RELATIVE PERCENT: 34.3 % (ref 20–42)
MCH RBC QN AUTO: 29.7 PG (ref 26–35)
MCHC RBC AUTO-ENTMCNC: 32.6 % (ref 32–34.5)
MCV RBC AUTO: 91.4 FL (ref 80–99.9)
MONOCYTES ABSOLUTE: 0.41 E9/L (ref 0.1–0.95)
MONOCYTES RELATIVE PERCENT: 4.1 % (ref 2–12)
MUCUS: PRESENT /LPF
NEUTROPHILS ABSOLUTE: 5.93 E9/L (ref 1.8–7.3)
NEUTROPHILS RELATIVE PERCENT: 59.3 % (ref 43–80)
NITRITE, URINE: NEGATIVE
PDW BLD-RTO: 13.2 FL (ref 11.5–15)
PH UA: 5.5 (ref 5–9)
PLATELET # BLD: 335 E9/L (ref 130–450)
PMV BLD AUTO: 11 FL (ref 7–12)
POTASSIUM SERPL-SCNC: 4.2 MMOL/L (ref 3.5–5)
PROTEIN UA: NEGATIVE MG/DL
RBC # BLD: 4.74 E12/L (ref 3.5–5.5)
RBC UA: ABNORMAL /HPF (ref 0–2)
SODIUM BLD-SCNC: 134 MMOL/L (ref 132–146)
SPECIFIC GRAVITY UA: 1.02 (ref 1–1.03)
TOTAL PROTEIN: 7.3 G/DL (ref 6.4–8.3)
UROBILINOGEN, URINE: 0.2 E.U./DL
WBC # BLD: 10 E9/L (ref 4.5–11.5)
WBC UA: ABNORMAL /HPF (ref 0–5)
YEAST: PRESENT /HPF

## 2022-05-05 PROCEDURE — 99284 EMERGENCY DEPT VISIT MOD MDM: CPT

## 2022-05-05 PROCEDURE — 96374 THER/PROPH/DIAG INJ IV PUSH: CPT

## 2022-05-05 PROCEDURE — 6370000000 HC RX 637 (ALT 250 FOR IP)

## 2022-05-05 PROCEDURE — 74176 CT ABD & PELVIS W/O CONTRAST: CPT

## 2022-05-05 PROCEDURE — 87186 SC STD MICRODIL/AGAR DIL: CPT

## 2022-05-05 PROCEDURE — 6370000000 HC RX 637 (ALT 250 FOR IP): Performed by: NURSE PRACTITIONER

## 2022-05-05 PROCEDURE — 85025 COMPLETE CBC W/AUTO DIFF WBC: CPT

## 2022-05-05 PROCEDURE — 87088 URINE BACTERIA CULTURE: CPT

## 2022-05-05 PROCEDURE — 87077 CULTURE AEROBIC IDENTIFY: CPT

## 2022-05-05 PROCEDURE — 81001 URINALYSIS AUTO W/SCOPE: CPT

## 2022-05-05 PROCEDURE — 6360000002 HC RX W HCPCS: Performed by: EMERGENCY MEDICINE

## 2022-05-05 PROCEDURE — 80053 COMPREHEN METABOLIC PANEL: CPT

## 2022-05-05 RX ORDER — KETOROLAC TROMETHAMINE 30 MG/ML
30 INJECTION, SOLUTION INTRAMUSCULAR; INTRAVENOUS ONCE
Status: COMPLETED | OUTPATIENT
Start: 2022-05-05 | End: 2022-05-05

## 2022-05-05 RX ORDER — ONDANSETRON 4 MG/1
4 TABLET, ORALLY DISINTEGRATING ORAL ONCE
Status: COMPLETED | OUTPATIENT
Start: 2022-05-05 | End: 2022-05-05

## 2022-05-05 RX ORDER — PHENAZOPYRIDINE HYDROCHLORIDE 100 MG/1
200 TABLET, FILM COATED ORAL ONCE
Status: COMPLETED | OUTPATIENT
Start: 2022-05-05 | End: 2022-05-05

## 2022-05-05 RX ORDER — PHENAZOPYRIDINE HYDROCHLORIDE 100 MG/1
100 TABLET, FILM COATED ORAL 3 TIMES DAILY
Qty: 9 TABLET | Refills: 0 | Status: SHIPPED | OUTPATIENT
Start: 2022-05-05 | End: 2022-05-08

## 2022-05-05 RX ADMIN — ONDANSETRON 4 MG: 4 TABLET, ORALLY DISINTEGRATING ORAL at 14:06

## 2022-05-05 RX ADMIN — KETOROLAC TROMETHAMINE 30 MG: 30 INJECTION, SOLUTION INTRAMUSCULAR at 15:52

## 2022-05-05 RX ADMIN — PHENAZOPYRIDINE HYDROCHLORIDE 200 MG: 100 TABLET ORAL at 16:45

## 2022-05-05 ASSESSMENT — PAIN SCALES - GENERAL
PAINLEVEL_OUTOF10: 9
PAINLEVEL_OUTOF10: 9
PAINLEVEL_OUTOF10: 10

## 2022-05-05 ASSESSMENT — PAIN DESCRIPTION - LOCATION
LOCATION: ABDOMEN
LOCATION: ABDOMEN
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
## 2022-05-05 NOTE — ED NOTES
Department of Emergency Medicine  FIRST PROVIDER TRIAGE NOTE             Independent MLP           5/5/22  1:53 PM EDT    Date of Encounter: 5/5/22   MRN: [id number]      HPI: Amara Cuadra is a 42 y.o. female who presents to the ED for Abdominal Pain (tx for UTI last week, started having right sided abd pain started yesterday. )   treated last week for uti with bactrim, states her symptoms have not improved and is now worse, reports nausea with vomiting and also, abdominal pain    ROS: Negative for cp or sob. PE: Gen Appearance/Constitutional: alert  GI: tender to palpation, right CVA     Initial Plan of Care: All treatment areas with department are currently occupied. Plan to order/Initiate the following while awaiting opening in ED: labs and imaging studies.   Initiate Treatment-Testing, Proceed toTreatment Area When Bed Available for ED Attending/MLP to Continue Care    Electronically signed by ENEDINA Oliveira CNP   DD: 5/5/22         ENEDINA Arce CNP  05/05/22 6890

## 2022-05-05 NOTE — ED NOTES
INSTRUCTION TO PT AND  PER RESIDENT. PT STATES PAIN AT 9/10 DR AWARE. MEDICATED PER ORDER.       Jose Manuel Boyd, HARLEY  20/40/58 3633

## 2022-05-06 ASSESSMENT — ENCOUNTER SYMPTOMS
EYE ITCHING: 0
NAUSEA: 1
EYE REDNESS: 0
WHEEZING: 0
CONSTIPATION: 0
DIARRHEA: 1
SORE THROAT: 0
BACK PAIN: 0
RHINORRHEA: 0
CHEST TIGHTNESS: 0
VOMITING: 1
ABDOMINAL PAIN: 1
SHORTNESS OF BREATH: 0

## 2022-05-07 LAB
ORGANISM: ABNORMAL
URINE CULTURE, ROUTINE: ABNORMAL

## 2022-05-12 ENCOUNTER — HOSPITAL ENCOUNTER (EMERGENCY)
Age: 47
Discharge: HOME | End: 2022-05-12
Attending: EMERGENCY MEDICINE
Payer: COMMERCIAL

## 2022-05-12 VITALS
DIASTOLIC BLOOD PRESSURE: 78 MMHG | TEMPERATURE: 97 F | RESPIRATION RATE: 16 BRPM | SYSTOLIC BLOOD PRESSURE: 148 MMHG | OXYGEN SATURATION: 100 % | HEART RATE: 106 BPM

## 2022-05-12 DIAGNOSIS — N10 ACUTE PYELONEPHRITIS: Primary | ICD-10-CM

## 2022-05-12 DIAGNOSIS — J06.9 VIRAL URI WITH COUGH: ICD-10-CM

## 2022-05-12 LAB
BILIRUBIN URINE: NEGATIVE
BLOOD, URINE: NEGATIVE
CLARITY: CLEAR
COLOR: YELLOW
GLUCOSE URINE: >=1000 MG/DL
KETONES, URINE: ABNORMAL MG/DL
LEUKOCYTE ESTERASE, URINE: NEGATIVE
NITRITE, URINE: NEGATIVE
PH UA: 5 (ref 5–9)
PROTEIN UA: NEGATIVE MG/DL
SPECIFIC GRAVITY UA: 1.01 (ref 1–1.03)
UROBILINOGEN, URINE: 0.2 E.U./DL

## 2022-05-12 PROCEDURE — 81003 URINALYSIS AUTO W/O SCOPE: CPT

## 2022-05-12 PROCEDURE — 99284 EMERGENCY DEPT VISIT MOD MDM: CPT

## 2022-05-12 PROCEDURE — 6360000002 HC RX W HCPCS: Performed by: EMERGENCY MEDICINE

## 2022-05-12 PROCEDURE — 96372 THER/PROPH/DIAG INJ SC/IM: CPT

## 2022-05-12 RX ORDER — BROMPHENIRAMINE MALEATE, PSEUDOEPHEDRINE HYDROCHLORIDE, AND DEXTROMETHORPHAN HYDROBROMIDE 2; 30; 10 MG/5ML; MG/5ML; MG/5ML
5 SYRUP ORAL 4 TIMES DAILY PRN
Qty: 120 ML | Refills: 0 | Status: SHIPPED | OUTPATIENT
Start: 2022-05-12 | End: 2022-05-22

## 2022-05-12 RX ORDER — AMOXICILLIN AND CLAVULANATE POTASSIUM 875; 125 MG/1; MG/1
1 TABLET, FILM COATED ORAL 2 TIMES DAILY
Qty: 20 TABLET | Refills: 0 | Status: SHIPPED | OUTPATIENT
Start: 2022-05-12 | End: 2022-05-22

## 2022-05-12 RX ORDER — KETOROLAC TROMETHAMINE 30 MG/ML
30 INJECTION, SOLUTION INTRAMUSCULAR; INTRAVENOUS ONCE
Status: COMPLETED | OUTPATIENT
Start: 2022-05-12 | End: 2022-05-12

## 2022-05-12 RX ORDER — KETOROLAC TROMETHAMINE 10 MG/1
10 TABLET, FILM COATED ORAL EVERY 6 HOURS PRN
Qty: 20 TABLET | Refills: 0 | Status: SHIPPED | OUTPATIENT
Start: 2022-05-12 | End: 2022-05-25 | Stop reason: ALTCHOICE

## 2022-05-12 RX ORDER — FLUCONAZOLE 150 MG/1
150 TABLET ORAL ONCE
Qty: 1 TABLET | Refills: 0 | Status: SHIPPED | OUTPATIENT
Start: 2022-05-12 | End: 2022-05-12

## 2022-05-12 RX ADMIN — KETOROLAC TROMETHAMINE 30 MG: 30 INJECTION, SOLUTION INTRAMUSCULAR at 09:18

## 2022-05-12 ASSESSMENT — PAIN SCALES - GENERAL
PAINLEVEL_OUTOF10: 7
PAINLEVEL_OUTOF10: 7

## 2022-05-12 ASSESSMENT — ENCOUNTER SYMPTOMS
SHORTNESS OF BREATH: 0
ABDOMINAL DISTENTION: 0
EYE REDNESS: 0
NAUSEA: 0
COUGH: 1
SINUS PRESSURE: 0
EYE DISCHARGE: 0
SORE THROAT: 0
DIARRHEA: 0
BACK PAIN: 0
EYE PAIN: 0
VOMITING: 0
WHEEZING: 0

## 2022-05-12 ASSESSMENT — PAIN DESCRIPTION - DESCRIPTORS: DESCRIPTORS: ACHING;PRESSURE

## 2022-05-12 ASSESSMENT — PAIN - FUNCTIONAL ASSESSMENT: PAIN_FUNCTIONAL_ASSESSMENT: 0-10

## 2022-05-12 ASSESSMENT — PAIN DESCRIPTION - PAIN TYPE: TYPE: ACUTE PAIN

## 2022-05-12 ASSESSMENT — PAIN DESCRIPTION - ONSET: ONSET: ON-GOING

## 2022-05-12 ASSESSMENT — PAIN DESCRIPTION - LOCATION: LOCATION: FLANK

## 2022-05-12 ASSESSMENT — PAIN DESCRIPTION - FREQUENCY: FREQUENCY: CONTINUOUS

## 2022-05-12 ASSESSMENT — PAIN DESCRIPTION - ORIENTATION: ORIENTATION: RIGHT

## 2022-05-12 NOTE — Clinical Note
Bebe Paniagua was seen and treated in our emergency department on 5/12/2022. She may return to work on 05/16/2022. If you have any questions or concerns, please don't hesitate to call.       Sofya Dyson, DO

## 2022-05-12 NOTE — ED PROVIDER NOTES
URI symptoms; history of renal calculi; seen ED [facility] several days ago re: UTI, C and S reveals E coli with multiple sensitivites      Illness   The current episode started 3 to 5 days ago. The onset was gradual. The problem occurs frequently. The problem has been gradually worsening. The problem is moderate. Nothing relieves the symptoms. The symptoms are aggravated by activity. Associated symptoms include congestion, ear pain, cough and URI. Pertinent negatives include no fever, no diarrhea, no nausea, no vomiting, no headaches, no sore throat, no wheezing, no rash, no eye discharge, no eye pain and no eye redness. She has been less active. She has been eating and drinking normally. Urine output has been normal. The last void occurred less than 6 hours ago. There were no sick contacts. Recently, medical care has been given at another facility. Services received include medications given. Review of Systems   Constitutional: Negative for chills and fever. HENT: Positive for congestion and ear pain. Negative for sinus pressure and sore throat. Eyes: Negative for pain, discharge and redness. Respiratory: Positive for cough. Negative for shortness of breath and wheezing. Cardiovascular: Negative for chest pain. Gastrointestinal: Negative for abdominal distention, diarrhea, nausea and vomiting. Genitourinary: Positive for flank pain. Negative for dysuria and frequency. Musculoskeletal: Negative for arthralgias and back pain. Skin: Negative for rash and wound. Neurological: Negative for weakness and headaches. Hematological: Negative for adenopathy. Psychiatric/Behavioral: Negative. All other systems reviewed and are negative. Physical Exam  Vitals and nursing note reviewed. Constitutional:       Appearance: She is well-developed. HENT:      Head: Normocephalic and atraumatic. Right Ear: A middle ear effusion is present.       Left Ear: A middle ear effusion is present. Nose: Congestion and rhinorrhea present. Mouth/Throat:      Pharynx: Posterior oropharyngeal erythema present. Eyes:      Pupils: Pupils are equal, round, and reactive to light. Cardiovascular:      Rate and Rhythm: Regular rhythm. Tachycardia present. Heart sounds: Normal heart sounds. No murmur heard. Pulmonary:      Effort: Pulmonary effort is normal.      Breath sounds: Normal breath sounds. Abdominal:      General: Bowel sounds are normal.      Palpations: Abdomen is soft. Tenderness: There is no abdominal tenderness. There is right CVA tenderness. There is no guarding or rebound. Musculoskeletal:      Cervical back: Normal range of motion and neck supple. Skin:     General: Skin is warm and dry. Neurological:      Mental Status: She is alert and oriented to person, place, and time. Psychiatric:         Behavior: Behavior normal.         Thought Content: Thought content normal.         Judgment: Judgment normal.       --------------------------------------------- PAST HISTORY ---------------------------------------------  Past Medical History:  has a past medical history of BRCA1 gene mutation positive, [facility] Diabetes mellitus ([facility] Headache(784.0), Hyperlipidemia, Hypertension, Kidney stone, Lumbar herniated disc, and Migraine. Past Surgical History:  has a past surgical history that includes Cholecystectomy; Tubal ligation; LEEP; Tonsillectomy; Tibia fracture surgery; Ankle surgery; Breast biopsy; Breast lumpectomy; and Hysterectomy. Social History:  reports that she quit smoking about 7 years ago. She smoked 0.00 packs per day for 0.00 years. She has never used smokeless tobacco. She reports that she does not drink alcohol and does not use drugs.     Family History: family history includes Alcohol Abuse in her father; Asthma in her daughter and son; Brain Cancer in her daughter; Cancer in her father, maternal grandfather, maternal grandmother, paternal grandfather, and paternal grandmother; Diabetes in her maternal grandmother and mother; Heart Attack in her father; Hypotension in her father; Kidney Cancer in her maternal grandfather; Eddye Maillard in her father. The patients home medications have been reviewed. Allergies: Patient has no known allergies. -------------------------------------------------- RESULTS -------------------------------------------------  No results found for this visit on 05/12/22. No orders to display       ------------------------- NURSING NOTES AND VITALS REVIEWED ---------------------------   The nursing notes within the ED encounter and vital signs as below have been reviewed. BP (!) 148/78   Pulse 106   Temp 97 °F (36.1 °C) (Temporal)   Resp 16   LMP 08/16/2011   SpO2 100%   Oxygen Saturation Interpretation: Normal      ------------------------------------------ PROGRESS NOTES ------------------------------------------   I have spoken with the patient and discussed todays results, in addition to providing specific details for the plan of care and counseling regarding the diagnosis and prognosis. Their questions are answered at this time and they are agreeable with the plan.      --------------------------------- ADDITIONAL PROVIDER NOTES ---------------------------------        This patient is stable for discharge. I have shared the specific conditions for return, as well as the importance of follow-up. IMPRESSION:     1. Acute pyelonephritis    2.  Viral URI with cough      Patient's Medications   New Prescriptions    No medications on file   Previous Medications    B-D INS SYRINGE 0.5CC/31GX5/16 31G X 5/16\" 0.5 ML MISC        BLOOD GLUCOSE MONITORING SUPPL (FREESTYLE LITE) BIRDIE        BLOOD GLUCOSE MONITORING SUPPL (ONE TOUCH ULTRA MINI) W/DEVICE KIT    Check blood sugar bid    DULAGLUTIDE (TRULICITY SC)    Inject 36 Units into the skin in the morning and at bedtime    GLUCOSE BLOOD VI TEST STRIPS (ONE TOUCH ULTRA TEST) STRIP    Check blood sugar 4 times a day    INSULIN PEN NEEDLE 32G X 6 MM MISC    1 Device by Does not apply route daily    INSULIN SYRINGE .5CC/29G (AIMSCO INSULIN SYR ULTRA THIN) 29G X 1/2\" 0.5 ML MISC    Use as directed    LISINOPRIL (PRINIVIL;ZESTRIL) 20 MG TABLET    Take 20 mg by mouth daily    METFORMIN (GLUCOPHAGE) 1000 MG TABLET    Take 1,000 mg by mouth 2 times daily (with meals)    ONE TOUCH LANCETS MISC    Check blood sugar bid    TAMOXIFEN (NOLVADEX) 20 MG TABLET    Take 20 mg by mouth nightly   Modified Medications    No medications on file   Discontinued Medications    IBUPROFEN (ADVIL;MOTRIN) 600 MG TABLET    Take 1 tablet by mouth every 6 hours as needed for Pain or Fever Take WITH Food / Meals.     INSULIN GLARGINE (BASAGLAR KWIKPEN) 100 UNIT/ML INJECTION PEN    Inject 37 Units into the skin 2 times daily     OMEPRAZOLE (PRILOSEC) 20 MG DELAYED RELEASE CAPSULE    Take 20 mg by mouth daily    STEGLATRO 5 MG TABS    Take 5 mg by mouth daily      Labs Reviewed   URINALYSIS - Abnormal; Notable for the following components:       Result Value    Glucose, Ur >=1000 (*)     Ketones, Urine TRACE (*)     All other components within normal limits       Procedures     BISI Harding DO  05/12/22 9413

## 2022-05-25 ENCOUNTER — HOSPITAL ENCOUNTER (EMERGENCY)
Age: 47
Discharge: HOME | End: 2022-05-25
Attending: EMERGENCY MEDICINE
Payer: COMMERCIAL

## 2022-05-25 VITALS
BODY MASS INDEX: 30.23 KG/M2 | WEIGHT: 193 LBS | DIASTOLIC BLOOD PRESSURE: 71 MMHG | OXYGEN SATURATION: 98 % | HEART RATE: 98 BPM | TEMPERATURE: 97.1 F | SYSTOLIC BLOOD PRESSURE: 115 MMHG | RESPIRATION RATE: 18 BRPM

## 2022-05-25 DIAGNOSIS — S29.019A THORACIC MYOFASCIAL STRAIN, INITIAL ENCOUNTER: Primary | ICD-10-CM

## 2022-05-25 PROCEDURE — 99283 EMERGENCY DEPT VISIT LOW MDM: CPT

## 2022-05-25 RX ORDER — CYCLOBENZAPRINE HCL 10 MG
10 TABLET ORAL 3 TIMES DAILY PRN
Qty: 21 TABLET | Refills: 0 | Status: SHIPPED | OUTPATIENT
Start: 2022-05-25 | End: 2022-06-04

## 2022-05-25 RX ORDER — MELOXICAM 15 MG/1
15 TABLET ORAL DAILY
Qty: 30 TABLET | Refills: 0 | Status: SHIPPED | OUTPATIENT
Start: 2022-05-25 | End: 2022-06-24

## 2022-05-25 ASSESSMENT — PAIN SCALES - GENERAL
PAINLEVEL_OUTOF10: 6
PAINLEVEL_OUTOF10: 6

## 2022-05-25 ASSESSMENT — ENCOUNTER SYMPTOMS
VOMITING: 0
COUGH: 0
DIARRHEA: 0
SINUS PRESSURE: 0
SORE THROAT: 0
NAUSEA: 0
EYE DISCHARGE: 0
BACK PAIN: 1
ABDOMINAL DISTENTION: 0
SHORTNESS OF BREATH: 0
WHEEZING: 0
EYE PAIN: 0
EYE REDNESS: 0

## 2022-05-25 ASSESSMENT — PAIN DESCRIPTION - LOCATION: LOCATION: BACK

## 2022-05-25 ASSESSMENT — PAIN - FUNCTIONAL ASSESSMENT
PAIN_FUNCTIONAL_ASSESSMENT: 0-10
PAIN_FUNCTIONAL_ASSESSMENT: 0-10

## 2022-05-25 ASSESSMENT — PAIN DESCRIPTION - FREQUENCY: FREQUENCY: CONTINUOUS

## 2022-05-25 ASSESSMENT — PAIN DESCRIPTION - ONSET: ONSET: GRADUAL

## 2022-05-25 ASSESSMENT — PAIN DESCRIPTION - ORIENTATION: ORIENTATION: LOWER;POSTERIOR

## 2022-05-25 ASSESSMENT — PAIN DESCRIPTION - DESCRIPTORS: DESCRIPTORS: ACHING;BURNING

## 2022-05-25 NOTE — Clinical Note
Shila Chen was seen and treated in our emergency department on 5/25/2022. She may return to work on 05/27/2022. If you have any questions or concerns, please don't hesitate to call.       Felisha Gonzalez, DO

## 2022-05-25 NOTE — ED PROVIDER NOTES
Lifting basket out of back seat of car; pain and spasm    The history is provided by the patient. Back Pain  Location:  Thoracic spine  Quality:  Aching and burning  Pain severity:  Mild  Onset quality:  Unable to specify  Timing:  Constant  Progression:  Waxing and waning  Chronicity:  New  Context: physical stress    Relieved by:  Nothing  Worsened by:  Bending, twisting and movement  Ineffective treatments:  None tried  Associated symptoms: no chest pain, no dysuria, no fever, no headaches and no weakness    Risk factors: hx of osteoporosis and lack of exercise         Review of Systems   Constitutional: Negative for chills and fever. HENT: Negative for ear pain, sinus pressure and sore throat. Eyes: Negative for pain, discharge and redness. Respiratory: Negative for cough, shortness of breath and wheezing. Cardiovascular: Negative for chest pain. Gastrointestinal: Negative for abdominal distention, diarrhea, nausea and vomiting. Genitourinary: Negative for dysuria and frequency. Musculoskeletal: Positive for back pain and myalgias. Negative for arthralgias. Skin: Negative for rash and wound. Neurological: Negative for weakness and headaches. Hematological: Negative for adenopathy. Psychiatric/Behavioral: Negative. All other systems reviewed and are negative. Physical Exam  Vitals and nursing note reviewed. Constitutional:       Appearance: She is well-developed. HENT:      Head: Normocephalic and atraumatic. Eyes:      Pupils: Pupils are equal, round, and reactive to light. Cardiovascular:      Rate and Rhythm: Normal rate and regular rhythm. Heart sounds: Normal heart sounds. No murmur heard. Pulmonary:      Effort: Pulmonary effort is normal.      Breath sounds: Normal breath sounds. Abdominal:      General: Bowel sounds are normal.      Palpations: Abdomen is soft. Tenderness: There is no abdominal tenderness. There is no guarding or rebound. Musculoskeletal:      Cervical back: Normal range of motion and neck supple. Thoracic back: Spasms and tenderness present. Decreased range of motion. Back:    Skin:     General: Skin is warm and dry. Neurological:      Mental Status: She is alert and oriented to person, place, and time. Psychiatric:         Behavior: Behavior normal.         Thought Content: Thought content normal.         Judgment: Judgment normal.        --------------------------------------------- PAST HISTORY ---------------------------------------------  Past Medical History:  has a past medical history of BRCA1 gene mutation positive, Breast cancer ([facility] [address] Cancer ([facility] [address] [facility] Headache(784.0), Hyperlipidemia, Hypertension, Kidney stone, Lumbar herniated disc, and Migraine. Past Surgical History:  has a past surgical history that includes Cholecystectomy; Tubal ligation; LEEP; Tonsillectomy; Tibia fracture surgery; Ankle surgery; Breast biopsy; Breast lumpectomy; and Hysterectomy. Social History:  reports that she quit smoking about 7 years ago. She smoked 0.00 packs per day for 0.00 years. She has never used smokeless tobacco. She reports that she does not drink alcohol and does not use drugs. Family History: family history includes Alcohol Abuse in her father; Asthma in her daughter and son; Brain Cancer in her daughter; Cancer in her father, maternal grandfather, maternal grandmother, paternal grandfather, and paternal grandmother; Diabetes in her maternal grandmother and mother; Heart Attack in her father; Hypotension in her father; Kidney Cancer in her maternal grandfather; Yadiel Binder in her father. The patients home medications have been reviewed. Allergies: Patient has no known allergies. -------------------------------------------------- RESULTS -------------------------------------------------  No results found for this visit on 05/25/22.   No orders to display ------------------------- NURSING NOTES AND VITALS REVIEWED ---------------------------   The nursing notes within the ED encounter and vital signs as below have been reviewed. /71   Pulse 98   Temp 97.1 °F (36.2 °C) (Temporal)   Resp 18   Wt 193 lb (87.5 kg)   LMP 08/16/2011   SpO2 98%   BMI 30.23 kg/m²   Oxygen Saturation Interpretation: Normal      ------------------------------------------ PROGRESS NOTES ------------------------------------------   I have spoken with the patient and discussed todays results, in addition to providing specific details for the plan of care and counseling regarding the diagnosis and prognosis. Their questions are answered at this time and they are agreeable with the plan.      --------------------------------- ADDITIONAL PROVIDER NOTES ---------------------------------        This patient is stable for discharge. I have shared the specific conditions for return, as well as the importance of follow-up. IMPRESSION:     1.  Thoracic myofascial strain, initial encounter      Patient's Medications   New Prescriptions    CYCLOBENZAPRINE (FLEXERIL) 10 MG TABLET    Take 1 tablet by mouth 3 times daily as needed for Muscle spasms    MELOXICAM (MOBIC) 15 MG TABLET    Take 1 tablet by mouth daily   Previous Medications    B-D INS SYRINGE 0.5CC/31GX5/16 31G X 5/16\" 0.5 ML MISC        BLOOD GLUCOSE MONITORING SUPPL (FREESTYLE LITE) BIRDIE        BLOOD GLUCOSE MONITORING SUPPL (ONE TOUCH ULTRA MINI) W/DEVICE KIT    Check blood sugar bid    [facility]UTIDE ([facility])    Inject 36 Units into the skin in the morning and at bedtime    GLUCOSE BLOOD VI TEST STRIPS (ONE TOUCH ULTRA TEST) STRIP    Check blood sugar 4 times a day    INSULIN PEN NEEDLE 32G X 6 MM MISC    1 Device by Does not apply route daily    INSULIN SYRINGE .5CC/29G (AIMSCO INSULIN SYR ULTRA THIN) 29G X 1/2\" 0.5 ML MISC    Use as directed    LISINOPRIL (PRINIVIL;ZESTRIL) 20 MG TABLET    Take 20 mg by mouth daily    METFORMIN (GLUCOPHAGE) 1000 MG TABLET    Take 1,000 mg by mouth 2 times daily (with meals)    ONE TOUCH LANCETS MISC    Check blood sugar bid    TAMOXIFEN (NOLVADEX) 20 MG TABLET    Take 20 mg by mouth nightly   Modified Medications    No medications on file   Discontinued Medications    KETOROLAC (TORADOL) 10 MG TABLET    Take 1 tablet by mouth every 6 hours as needed for Pain This patient has received prior doses of Toradol without untoward symptoms         Procedures     [facility]                  Robert Nielson, DO  05/25/22 1002

## 2022-06-08 ENCOUNTER — HOSPITAL ENCOUNTER (EMERGENCY)
Age: 47
Discharge: HOME | End: 2022-06-08
Attending: EMERGENCY MEDICINE
Payer: COMMERCIAL

## 2022-06-08 VITALS
HEART RATE: 80 BPM | TEMPERATURE: 96.9 F | OXYGEN SATURATION: 98 % | HEIGHT: 67 IN | WEIGHT: 184 LBS | DIASTOLIC BLOOD PRESSURE: 79 MMHG | BODY MASS INDEX: 28.88 KG/M2 | SYSTOLIC BLOOD PRESSURE: 122 MMHG | RESPIRATION RATE: 16 BRPM

## 2022-06-08 DIAGNOSIS — R81 GLUCOSURIA: ICD-10-CM

## 2022-06-08 DIAGNOSIS — R30.0 DYSURIA: Primary | ICD-10-CM

## 2022-06-08 LAB
BACTERIA: ABNORMAL /HPF
BILIRUBIN URINE: NEGATIVE
BLOOD, URINE: NEGATIVE
CLARITY: CLEAR
COLOR: YELLOW
EPITHELIAL CELLS, UA: ABNORMAL /HPF
GLUCOSE URINE: 500 MG/DL
KETONES, URINE: ABNORMAL MG/DL
LEUKOCYTE ESTERASE, URINE: NEGATIVE
NITRITE, URINE: NEGATIVE
PH UA: 5.5 (ref 5–9)
PROTEIN UA: NEGATIVE MG/DL
RBC UA: ABNORMAL /HPF (ref 0–2)
SPECIFIC GRAVITY UA: 1.02 (ref 1–1.03)
UROBILINOGEN, URINE: 0.2 E.U./DL
WBC UA: ABNORMAL /HPF (ref 0–5)

## 2022-06-08 PROCEDURE — 99283 EMERGENCY DEPT VISIT LOW MDM: CPT

## 2022-06-08 PROCEDURE — 81001 URINALYSIS AUTO W/SCOPE: CPT

## 2022-06-08 RX ORDER — SULFAMETHOXAZOLE AND TRIMETHOPRIM 800; 160 MG/1; MG/1
1 TABLET ORAL 2 TIMES DAILY
Qty: 20 TABLET | Refills: 0 | Status: SHIPPED | OUTPATIENT
Start: 2022-06-08 | End: 2022-06-18

## 2022-06-08 RX ORDER — PHENAZOPYRIDINE HYDROCHLORIDE 100 MG/1
100 TABLET, FILM COATED ORAL 3 TIMES DAILY PRN
Qty: 6 TABLET | Refills: 0 | Status: SHIPPED | OUTPATIENT
Start: 2022-06-08 | End: 2022-06-11

## 2022-06-08 ASSESSMENT — ENCOUNTER SYMPTOMS
EYE DISCHARGE: 0
BACK PAIN: 0
EYE PAIN: 0
SINUS PRESSURE: 0
WHEEZING: 0
VOMITING: 0
DIARRHEA: 0
COUGH: 0
EYE REDNESS: 0
SHORTNESS OF BREATH: 0
SORE THROAT: 0
NAUSEA: 0
ABDOMINAL DISTENTION: 0

## 2022-06-08 ASSESSMENT — PAIN SCALES - GENERAL: PAINLEVEL_OUTOF10: 7

## 2022-06-08 ASSESSMENT — PAIN DESCRIPTION - ONSET: ONSET: SUDDEN

## 2022-06-08 ASSESSMENT — PAIN - FUNCTIONAL ASSESSMENT: PAIN_FUNCTIONAL_ASSESSMENT: 0-10

## 2022-06-08 ASSESSMENT — PAIN DESCRIPTION - FREQUENCY: FREQUENCY: CONTINUOUS

## 2022-06-08 ASSESSMENT — PAIN DESCRIPTION - ORIENTATION: ORIENTATION: LOWER

## 2022-06-08 ASSESSMENT — PAIN DESCRIPTION - PAIN TYPE: TYPE: ACUTE PAIN

## 2022-06-08 ASSESSMENT — PAIN DESCRIPTION - DESCRIPTORS: DESCRIPTORS: SHARP;ACHING

## 2022-06-08 ASSESSMENT — PAIN DESCRIPTION - LOCATION: LOCATION: BACK

## 2022-06-08 NOTE — ED PROVIDER NOTES
The history is provided by the patient. Dysuria   This is a recurrent problem. The current episode started 2 days ago. The problem occurs every urination. The quality of the pain is described as burning. The pain is mild. There has been no fever. Associated symptoms include frequency and urgency. Pertinent negatives include no chills, no nausea, no vomiting, no hematuria and no flank pain. Review of Systems   Constitutional: Negative for chills and fever. HENT: Negative for ear pain, sinus pressure and sore throat. Eyes: Negative for pain, discharge and redness. Respiratory: Negative for cough, shortness of breath and wheezing. Cardiovascular: Negative for chest pain. Gastrointestinal: Negative for abdominal distention, diarrhea, nausea and vomiting. Genitourinary: Positive for dysuria, frequency and urgency. Negative for flank pain and hematuria. Musculoskeletal: Negative for arthralgias and back pain. Skin: Negative for rash and wound. Neurological: Negative for weakness and headaches. Hematological: Negative for adenopathy. All other systems reviewed and are negative. Physical Exam  Vitals and nursing note reviewed. Constitutional:       Appearance: She is well-developed. HENT:      Head: Normocephalic and atraumatic. Right Ear: Hearing and external ear normal. Tympanic membrane is retracted. Left Ear: Hearing and external ear normal. Tympanic membrane is retracted. Nose: Nose normal.      Mouth/Throat:      Pharynx: Uvula midline. Eyes:      General: Lids are normal.      Conjunctiva/sclera: Conjunctivae normal.      Pupils: Pupils are equal, round, and reactive to light. Cardiovascular:      Rate and Rhythm: Normal rate and regular rhythm. Heart sounds: Normal heart sounds. No murmur heard. Pulmonary:      Effort: Pulmonary effort is normal. No respiratory distress. Breath sounds: Normal breath sounds. No wheezing or rales. Abdominal:      General: Bowel sounds are normal.      Palpations: Abdomen is soft. Abdomen is not rigid. Tenderness: There is no abdominal tenderness. There is no guarding or rebound. Musculoskeletal:      Cervical back: Normal range of motion and neck supple. Skin:     General: Skin is warm and dry. Findings: No abrasion or rash. Neurological:      Mental Status: She is alert and oriented to person, place, and time. GCS: GCS eye subscore is 4. GCS verbal subscore is 5. GCS motor subscore is 6. Cranial Nerves: No cranial nerve deficit. Sensory: No sensory deficit. Coordination: Coordination normal.      Gait: Gait normal.          Procedures     MDM          --------------------------------------------- PAST HISTORY ---------------------------------------------  Past Medical History:  has a past medical history of BRCA1 gene mutation positive, Breast cancer ([facility] [address] Cancer ([facility] [address] [facility] Headache(784.0), Hyperlipidemia, Hypertension, Kidney stone, Lumbar herniated disc, and Migraine. Past Surgical History:  has a past surgical history that includes Cholecystectomy; Tubal ligation; LEEP; Tonsillectomy; Tibia fracture surgery; Ankle surgery; Breast biopsy; Breast lumpectomy; and Hysterectomy. Social History:  reports that she quit smoking about 7 years ago. She smoked 0.00 packs per day for 0.00 years. She has never used smokeless tobacco. She reports that she does not drink alcohol and does not use drugs. Family History: family history includes Alcohol Abuse in her father; Asthma in her daughter and son; Brain Cancer in her daughter; Cancer in her father, maternal grandfather, maternal grandmother, paternal grandfather, and paternal grandmother; Diabetes in her maternal grandmother and mother; Heart Attack in her father; Hypotension in her father; Kidney Cancer in her maternal grandfather; Mihaela Gathers in her father.      The patients home medications have been reviewed. Allergies: Patient has no known allergies. -------------------------------------------------- RESULTS -------------------------------------------------  Labs:  Results for orders placed or performed during the hospital encounter of 06/08/22   Urinalysis   Result Value Ref Range    Color, UA Yellow Straw/Yellow    Clarity, UA Clear Clear    Glucose, Ur 500 (A) Negative mg/dL    Bilirubin Urine Negative Negative    Ketones, Urine TRACE (A) Negative mg/dL    Specific Gravity, UA 1.025 1.005 - 1.030    Blood, Urine Negative Negative    pH, UA 5.5 5.0 - 9.0    Protein, UA Negative Negative mg/dL    Urobilinogen, Urine 0.2 <2.0 E.U./dL    Nitrite, Urine Negative Negative    Leukocyte Esterase, Urine Negative Negative       Radiology:  No orders to display       ------------------------- NURSING NOTES AND VITALS REVIEWED ---------------------------  Date / Time Roomed:  6/8/2022 10:24 AM  ED Bed Assignment:  01/01    The nursing notes within the ED encounter and vital signs as below have been reviewed. /79   Pulse 80   Temp 96.9 °F (36.1 °C) (Temporal)   Resp 16   Ht 5' 7\" (1.702 m)   Wt 184 lb (83.5 kg)   LMP 08/16/2011   SpO2 98%   BMI 28.82 kg/m²   Oxygen Saturation Interpretation: Normal      ------------------------------------------ PROGRESS NOTES ------------------------------------------  I have spoken with the patient and discussed todays results, in addition to providing specific details for the plan of care and counseling regarding the diagnosis and prognosis. Their questions are answered at this time and they are agreeable with the plan. I discussed at length with them reasons for immediate return here for re evaluation. They will followup with primary care by calling their office tomorrow.       --------------------------------- ADDITIONAL PROVIDER NOTES ---------------------------------  At this time the patient is without objective evidence of an acute process requiring hospitalization or inpatient management. They have remained hemodynamically stable throughout their entire ED visit and are stable for discharge with outpatient follow-up. The plan has been discussed in detail and they are aware of the specific conditions for emergent return, as well as the importance of follow-up. New Prescriptions    PHENAZOPYRIDINE (PYRIDIUM) 100 MG TABLET    Take 1 tablet by mouth 3 times daily as needed for Pain    SULFAMETHOXAZOLE-TRIMETHOPRIM (BACTRIM DS) 800-160 MG PER TABLET    Take 1 tablet by mouth 2 times daily for 10 days       Diagnosis:  1. Dysuria    2. Glucosuria        Disposition:  Patient's disposition: Discharge to home  Patient's condition is stable.                       Servando Hebert MD  06/08/22 0087

## 2022-06-23 PROBLEM — D06.9 CARCINOMA IN SITU OF UTERINE CERVIX: Status: ACTIVE | Noted: 2020-06-30

## 2022-06-23 PROBLEM — E11.9 TYPE 2 DIABETES MELLITUS WITHOUT COMPLICATION (HCC): Status: ACTIVE | Noted: 2020-03-06

## 2022-06-23 RX ORDER — INSULIN DEGLUDEC INJECTION 100 U/ML
INJECTION, SOLUTION SUBCUTANEOUS
COMMUNITY
End: 2022-06-24

## 2022-06-23 RX ORDER — ATORVASTATIN CALCIUM 40 MG/1
TABLET, FILM COATED ORAL
COMMUNITY
Start: 2022-04-29 | End: 2022-06-24 | Stop reason: SDUPTHER

## 2022-06-23 RX ORDER — OMEPRAZOLE 20 MG/1
CAPSULE, DELAYED RELEASE ORAL
COMMUNITY
End: 2022-06-24 | Stop reason: SDUPTHER

## 2022-06-23 RX ORDER — EZETIMIBE 10 MG/1
TABLET ORAL
COMMUNITY
End: 2022-06-24

## 2022-06-24 ENCOUNTER — OFFICE VISIT (OUTPATIENT)
Dept: PRIMARY CARE CLINIC | Age: 47
End: 2022-06-24
Payer: COMMERCIAL

## 2022-06-24 VITALS
RESPIRATION RATE: 16 BRPM | SYSTOLIC BLOOD PRESSURE: 110 MMHG | TEMPERATURE: 97.9 F | DIASTOLIC BLOOD PRESSURE: 70 MMHG | HEIGHT: 67 IN | HEART RATE: 94 BPM | BODY MASS INDEX: 30.32 KG/M2 | OXYGEN SATURATION: 97 % | WEIGHT: 193.2 LBS

## 2022-06-24 DIAGNOSIS — C50.511 MALIGNANT NEOPLASM OF LOWER-OUTER QUADRANT OF RIGHT FEMALE BREAST, UNSPECIFIED ESTROGEN RECEPTOR STATUS (HCC): ICD-10-CM

## 2022-06-24 DIAGNOSIS — Z13.6 ENCOUNTER FOR LIPID SCREENING FOR CARDIOVASCULAR DISEASE: ICD-10-CM

## 2022-06-24 DIAGNOSIS — E78.5 HYPERLIPIDEMIA, UNSPECIFIED HYPERLIPIDEMIA TYPE: ICD-10-CM

## 2022-06-24 DIAGNOSIS — Z79.4 TYPE 2 DIABETES MELLITUS WITH HYPERGLYCEMIA, WITH LONG-TERM CURRENT USE OF INSULIN (HCC): ICD-10-CM

## 2022-06-24 DIAGNOSIS — E11.65 TYPE 2 DIABETES MELLITUS WITH HYPERGLYCEMIA, WITH LONG-TERM CURRENT USE OF INSULIN (HCC): ICD-10-CM

## 2022-06-24 DIAGNOSIS — B37.31 VAGINAL YEAST INFECTION: ICD-10-CM

## 2022-06-24 DIAGNOSIS — R30.0 DYSURIA: ICD-10-CM

## 2022-06-24 DIAGNOSIS — E11.9 TYPE 2 DIABETES MELLITUS WITHOUT COMPLICATION, WITH LONG-TERM CURRENT USE OF INSULIN (HCC): ICD-10-CM

## 2022-06-24 DIAGNOSIS — Z13.220 ENCOUNTER FOR LIPID SCREENING FOR CARDIOVASCULAR DISEASE: ICD-10-CM

## 2022-06-24 DIAGNOSIS — Z87.440 HISTORY OF RECURRENT UTIS: Primary | ICD-10-CM

## 2022-06-24 DIAGNOSIS — Z79.4 TYPE 2 DIABETES MELLITUS WITHOUT COMPLICATION, WITH LONG-TERM CURRENT USE OF INSULIN (HCC): ICD-10-CM

## 2022-06-24 DIAGNOSIS — I10 PRIMARY HYPERTENSION: ICD-10-CM

## 2022-06-24 LAB
BILIRUBIN, POC: ABNORMAL
BLOOD URINE, POC: ABNORMAL
CLARITY, POC: CLEAR
COLOR, POC: YELLOW
GLUCOSE URINE, POC: 500
HBA1C MFR BLD: 11.4 %
KETONES, POC: ABNORMAL
LEUKOCYTE EST, POC: ABNORMAL
NITRITE, POC: ABNORMAL
PH, POC: 6
PROTEIN, POC: ABNORMAL
SPECIFIC GRAVITY, POC: 1.03
UROBILINOGEN, POC: 0.2

## 2022-06-24 PROCEDURE — 4004F PT TOBACCO SCREEN RCVD TLK: CPT | Performed by: STUDENT IN AN ORGANIZED HEALTH CARE EDUCATION/TRAINING PROGRAM

## 2022-06-24 PROCEDURE — 3046F HEMOGLOBIN A1C LEVEL >9.0%: CPT | Performed by: STUDENT IN AN ORGANIZED HEALTH CARE EDUCATION/TRAINING PROGRAM

## 2022-06-24 PROCEDURE — 81002 URINALYSIS NONAUTO W/O SCOPE: CPT | Performed by: STUDENT IN AN ORGANIZED HEALTH CARE EDUCATION/TRAINING PROGRAM

## 2022-06-24 PROCEDURE — 83036 HEMOGLOBIN GLYCOSYLATED A1C: CPT | Performed by: STUDENT IN AN ORGANIZED HEALTH CARE EDUCATION/TRAINING PROGRAM

## 2022-06-24 PROCEDURE — 99204 OFFICE O/P NEW MOD 45 MIN: CPT | Performed by: STUDENT IN AN ORGANIZED HEALTH CARE EDUCATION/TRAINING PROGRAM

## 2022-06-24 PROCEDURE — G8427 DOCREV CUR MEDS BY ELIG CLIN: HCPCS | Performed by: STUDENT IN AN ORGANIZED HEALTH CARE EDUCATION/TRAINING PROGRAM

## 2022-06-24 PROCEDURE — G8417 CALC BMI ABV UP PARAM F/U: HCPCS | Performed by: STUDENT IN AN ORGANIZED HEALTH CARE EDUCATION/TRAINING PROGRAM

## 2022-06-24 PROCEDURE — 2022F DILAT RTA XM EVC RTNOPTHY: CPT | Performed by: STUDENT IN AN ORGANIZED HEALTH CARE EDUCATION/TRAINING PROGRAM

## 2022-06-24 RX ORDER — LISINOPRIL 20 MG/1
20 TABLET ORAL DAILY
Qty: 90 TABLET | Refills: 1 | Status: SHIPPED | OUTPATIENT
Start: 2022-06-24

## 2022-06-24 RX ORDER — OMEPRAZOLE 20 MG/1
CAPSULE, DELAYED RELEASE ORAL
Qty: 90 CAPSULE | Refills: 1 | Status: SHIPPED | OUTPATIENT
Start: 2022-06-24

## 2022-06-24 RX ORDER — ATORVASTATIN CALCIUM 40 MG/1
TABLET, FILM COATED ORAL
Qty: 90 TABLET | Refills: 1 | Status: SHIPPED | OUTPATIENT
Start: 2022-06-24

## 2022-06-24 RX ORDER — FLUCONAZOLE 150 MG/1
150 TABLET ORAL ONCE
Qty: 1 TABLET | Refills: 1 | Status: SHIPPED | OUTPATIENT
Start: 2022-06-24 | End: 2022-06-24

## 2022-06-24 RX ORDER — INSULIN DEGLUDEC INJECTION 100 U/ML
36 INJECTION, SOLUTION SUBCUTANEOUS 2 TIMES DAILY
Qty: 6 PEN | Refills: 1 | Status: SHIPPED | OUTPATIENT
Start: 2022-06-24

## 2022-06-24 SDOH — ECONOMIC STABILITY: FOOD INSECURITY: WITHIN THE PAST 12 MONTHS, YOU WORRIED THAT YOUR FOOD WOULD RUN OUT BEFORE YOU GOT MONEY TO BUY MORE.: NEVER TRUE

## 2022-06-24 SDOH — ECONOMIC STABILITY: FOOD INSECURITY: WITHIN THE PAST 12 MONTHS, THE FOOD YOU BOUGHT JUST DIDN'T LAST AND YOU DIDN'T HAVE MONEY TO GET MORE.: NEVER TRUE

## 2022-06-24 ASSESSMENT — ENCOUNTER SYMPTOMS
DIARRHEA: 1
RESPIRATORY NEGATIVE: 1

## 2022-06-24 ASSESSMENT — SOCIAL DETERMINANTS OF HEALTH (SDOH): HOW HARD IS IT FOR YOU TO PAY FOR THE VERY BASICS LIKE FOOD, HOUSING, MEDICAL CARE, AND HEATING?: NOT HARD AT ALL

## 2022-06-24 ASSESSMENT — PATIENT HEALTH QUESTIONNAIRE - PHQ9
SUM OF ALL RESPONSES TO PHQ QUESTIONS 1-9: 0
SUM OF ALL RESPONSES TO PHQ QUESTIONS 1-9: 0
2. FEELING DOWN, DEPRESSED OR HOPELESS: 0
1. LITTLE INTEREST OR PLEASURE IN DOING THINGS: 0
SUM OF ALL RESPONSES TO PHQ QUESTIONS 1-9: 0
SUM OF ALL RESPONSES TO PHQ QUESTIONS 1-9: 0
SUM OF ALL RESPONSES TO PHQ9 QUESTIONS 1 & 2: 0

## 2022-06-24 NOTE — PROGRESS NOTES
Alexa Carter (:  1975) is a 42 y.o. female,New patient, here for evaluation of the following chief complaint(s):  New Patient (previous pcp Dr Addis Loja last seen about 3 month ago. ), Urinary Tract Infection (states has had multiple UTIs over the past 4 months. Hasnt saw urology. ), and Health Maintenance (Dr caass-last pap about 1 year ago mammogram through Dr Clau Hammond )         ASSESSMENT/PLAN:  1. History of recurrent UTIs  -     AFL - Charlene Mensah MD, Urology, [place]  2. Type 2 diabetes mellitus without complication, with long-term current use of insulin (HCC)  -     POCT glycosylated hemoglobin (Hb A1C)  -     Dulaglutide 1.5 MG/0.5ML SOPN; Inject 1.5 mg into the skin once a week, Disp-2 pen, R-1Normal  -     Insulin Degludec (TRESIBA FLEXTOUCH) 100 UNIT/ML SOPN; Inject 36 Units into the skin in the morning and at bedtime, Disp-6 pen, R-1Normal  -     MICROALBUMIN / CREATININE URINE RATIO; Future  -     Comprehensive Metabolic Panel; Future  -     CBC with Auto Differential; Future  -     TSH; Future  -     metFORMIN (GLUCOPHAGE) 1000 MG tablet; Take 1 tablet by mouth 2 times daily (with meals), Disp-180 tablet, R-1Normal  3. Malignant neoplasm of lower-outer quadrant of right female breast, unspecified estrogen receptor status ([facility] [address]  4. Dysuria  -     POCT Urinalysis no Micro  -     AFL - Charlene Mensah MD, Urology, [place]  5. Vaginal yeast infection  -     fluconazole (DIFLUCAN) 150 MG tablet; Take 1 tablet by mouth once for 1 dose, Disp-1 tablet, R-1Normal  6. Encounter for lipid screening for cardiovascular disease  -     LIPID PANEL; Future  7. Type 2 diabetes mellitus with hyperglycemia, with long-term current use of insulin (HCC)  -     Insulin Pen Needle 32G X 6 MM MISC; DAILY Starting 2022, Disp-100 each, R-12, Normal  8.  Hyperlipidemia, unspecified hyperlipidemia type  -     atorvastatin (LIPITOR) 40 MG tablet; take 1 tablet by mouth once daily, Disp-90 tablet, R-1Normal  9. Primary hypertension      Return in about 3 months (around 9/24/2022). Suspect UTIs and back pain are due to worsening glycemic control; UA today in office with no evidence of infection or blood; did have glucosuria; will still refer to urology in case symptoms do not improve; clarified medication and will restart Tresiba     Subjective   SUBJECTIVE/OBJECTIVE:  HPI     Patient is a 41 y/o F with a PMHx of T2DM, GERD, breast cancer on tamoxifen, recurrent UTIs and HTN who presents to establish care    She has had multiple UTIs with urine culture always growing E coli; STI screening and vaginal prep all negative; she recently had a CT abd/pelvis which showed a 2mm non-obstructing stone in R kidney; she currently reports intermittent flank pain bilaterally and increased frequency or urination; she also reports issues with recurrent vaginal yeast infections     T2DM- a1c today 11.4; she prescribed trulicity and tresiba but there was some confusion on which medication she was to be taking daily and which she is to be taking weekly; currently had only been taking trulicity; reports that in March, her a1c was 7.4; reports there has been more stress in her life; reports that she does eat many sweats and does not sugar sweetened beverages; last eye exam was in December and denies any hx of retinopathy; occasionally has some tingling in her feet but denies any non-healing wounds      Follows with Dr. Samara River at the cancer center for R breast cancer that was diagnosed in 2018 and patient underwent radiation; does have BRCA mutation; last mammogram 7/2021 [address]; has a colonoscopy every 2 years because she is at higher risk of colon cancer    S[facility]x- HONG, CCY, and BL oophrectomy     Social: currently smoking 0.5 PPD; denies alcohol drug use    Review of Systems   Constitutional: Positive for unexpected weight change. HENT: Negative. Respiratory: Negative. Cardiovascular: Negative. Gastrointestinal: Positive for diarrhea (chronic diarrhea). Genitourinary: Positive for flank pain (intermittent BL flank pain) and frequency. Skin: Negative. Neurological: Negative. Psychiatric/Behavioral: Negative. Objective   Physical Exam  Vitals reviewed. Constitutional:       General: She is not in acute distress. Appearance: Normal appearance. HENT:      Head: Normocephalic and atraumatic. Right Ear: Tympanic membrane, ear canal and external ear normal. There is no impacted cerumen. Left Ear: Tympanic membrane, ear canal and external ear normal. There is no impacted cerumen. Mouth/Throat:      Mouth: Mucous membranes are moist.      Pharynx: Oropharynx is clear. No oropharyngeal exudate. Eyes:      General:         Right eye: No discharge. Left eye: No discharge. Cardiovascular:      Rate and Rhythm: Normal rate and regular rhythm. Pulses: Normal pulses. Heart sounds: Normal heart sounds. Pulmonary:      Effort: Pulmonary effort is normal.      Breath sounds: Normal breath sounds. Abdominal:      General: Bowel sounds are normal.      Palpations: Abdomen is soft. Musculoskeletal:      Cervical back: Neck supple. No tenderness. Lymphadenopathy:      Cervical: No cervical adenopathy. Skin:     General: Skin is warm and dry. Neurological:      General: No focal deficit present. Mental Status: She is alert and oriented to person, place, and time. Psychiatric:         Mood and Affect: Mood normal.         Behavior: Behavior normal.                  An electronic signature was used to authenticate this note.     --Quan Multani MD

## 2022-06-29 ENCOUNTER — HOSPITAL ENCOUNTER (EMERGENCY)
Age: 47
Discharge: HOME | End: 2022-06-29
Attending: EMERGENCY MEDICINE
Payer: COMMERCIAL

## 2022-06-29 ENCOUNTER — APPOINTMENT (OUTPATIENT)
Dept: CT IMAGING | Age: 47
End: 2022-06-29
Payer: COMMERCIAL

## 2022-06-29 VITALS
TEMPERATURE: 97.1 F | DIASTOLIC BLOOD PRESSURE: 86 MMHG | SYSTOLIC BLOOD PRESSURE: 125 MMHG | OXYGEN SATURATION: 99 % | BODY MASS INDEX: 30.23 KG/M2 | RESPIRATION RATE: 18 BRPM | WEIGHT: 193 LBS | HEART RATE: 106 BPM

## 2022-06-29 VITALS
TEMPERATURE: 98.5 F | RESPIRATION RATE: 16 BRPM | DIASTOLIC BLOOD PRESSURE: 86 MMHG | SYSTOLIC BLOOD PRESSURE: 129 MMHG | OXYGEN SATURATION: 98 % | HEART RATE: 94 BPM

## 2022-06-29 DIAGNOSIS — R07.89 ATYPICAL CHEST PAIN: ICD-10-CM

## 2022-06-29 DIAGNOSIS — R10.13 ABDOMINAL PAIN, EPIGASTRIC: Primary | ICD-10-CM

## 2022-06-29 DIAGNOSIS — R11.2 NAUSEA AND VOMITING, INTRACTABILITY OF VOMITING NOT SPECIFIED, UNSPECIFIED VOMITING TYPE: Primary | ICD-10-CM

## 2022-06-29 LAB
ALBUMIN SERPL-MCNC: 3.9 G/DL (ref 3.5–5.2)
ALP BLD-CCNC: 80 U/L (ref 35–104)
ALT SERPL-CCNC: 9 U/L (ref 0–32)
ANION GAP SERPL CALCULATED.3IONS-SCNC: 9 MMOL/L (ref 7–16)
AST SERPL-CCNC: 12 U/L (ref 0–31)
BACTERIA: ABNORMAL /HPF
BASOPHILS ABSOLUTE: 0.07 E9/L (ref 0–0.2)
BASOPHILS RELATIVE PERCENT: 0.6 % (ref 0–2)
BETA-HYDROXYBUTYRATE: 0.18 MMOL/L (ref 0.02–0.27)
BILIRUB SERPL-MCNC: 0.2 MG/DL (ref 0–1.2)
BILIRUBIN URINE: NEGATIVE
BLOOD, URINE: NEGATIVE
BUN BLDV-MCNC: 9 MG/DL (ref 6–20)
CALCIUM SERPL-MCNC: 8.8 MG/DL (ref 8.6–10.2)
CHLORIDE BLD-SCNC: 103 MMOL/L (ref 98–107)
CLARITY: CLEAR
CO2: 26 MMOL/L (ref 22–29)
COLOR: YELLOW
CREAT SERPL-MCNC: 0.6 MG/DL (ref 0.5–1)
EKG ATRIAL RATE: 83 BPM
EKG ATRIAL RATE: 99 BPM
EKG P AXIS: 47 DEGREES
EKG P AXIS: 52 DEGREES
EKG P-R INTERVAL: 144 MS
EKG P-R INTERVAL: 150 MS
EKG Q-T INTERVAL: 356 MS
EKG Q-T INTERVAL: 368 MS
EKG QRS DURATION: 82 MS
EKG QRS DURATION: 84 MS
EKG QTC CALCULATION (BAZETT): 432 MS
EKG QTC CALCULATION (BAZETT): 456 MS
EKG R AXIS: 41 DEGREES
EKG R AXIS: 50 DEGREES
EKG T AXIS: 24 DEGREES
EKG T AXIS: 31 DEGREES
EKG VENTRICULAR RATE: 83 BPM
EKG VENTRICULAR RATE: 99 BPM
EOSINOPHILS ABSOLUTE: 0.14 E9/L (ref 0.05–0.5)
EOSINOPHILS RELATIVE PERCENT: 1.2 % (ref 0–6)
EPITHELIAL CELLS, UA: ABNORMAL /HPF
GFR AFRICAN AMERICAN: >60
GFR NON-AFRICAN AMERICAN: >60 ML/MIN/1.73
GLUCOSE BLD-MCNC: 289 MG/DL (ref 74–99)
GLUCOSE URINE: >=1000 MG/DL
HCT VFR BLD CALC: 44.6 % (ref 34–48)
HEMOGLOBIN: 14.8 G/DL (ref 11.5–15.5)
IMMATURE GRANULOCYTES #: 0.04 E9/L
IMMATURE GRANULOCYTES %: 0.3 % (ref 0–5)
KETONES, URINE: >=80 MG/DL
LACTIC ACID: 1.5 MMOL/L (ref 0.5–2.2)
LEUKOCYTE ESTERASE, URINE: NEGATIVE
LIPASE: 41 U/L (ref 13–60)
LYMPHOCYTES ABSOLUTE: 1.91 E9/L (ref 1.5–4)
LYMPHOCYTES RELATIVE PERCENT: 16.6 % (ref 20–42)
MCH RBC QN AUTO: 29.4 PG (ref 26–35)
MCHC RBC AUTO-ENTMCNC: 33.2 % (ref 32–34.5)
MCV RBC AUTO: 88.7 FL (ref 80–99.9)
MONOCYTES ABSOLUTE: 0.38 E9/L (ref 0.1–0.95)
MONOCYTES RELATIVE PERCENT: 3.3 % (ref 2–12)
NEUTROPHILS ABSOLUTE: 8.99 E9/L (ref 1.8–7.3)
NEUTROPHILS RELATIVE PERCENT: 78 % (ref 43–80)
NITRITE, URINE: POSITIVE
PDW BLD-RTO: 13 FL (ref 11.5–15)
PH UA: 6 (ref 5–9)
PH VENOUS: 7.42 (ref 7.35–7.45)
PLATELET # BLD: 301 E9/L (ref 130–450)
PMV BLD AUTO: 11.3 FL (ref 7–12)
POTASSIUM REFLEX MAGNESIUM: 3.9 MMOL/L (ref 3.5–5)
PROTEIN UA: >=300 MG/DL
RBC # BLD: 5.03 E12/L (ref 3.5–5.5)
RBC UA: ABNORMAL /HPF (ref 0–2)
SODIUM BLD-SCNC: 138 MMOL/L (ref 132–146)
SPECIFIC GRAVITY UA: >=1.03 (ref 1–1.03)
TOTAL PROTEIN: 6.7 G/DL (ref 6.4–8.3)
TROPONIN, HIGH SENSITIVITY: 8 NG/L (ref 0–9)
UROBILINOGEN, URINE: 0.2 E.U./DL
WBC # BLD: 11.5 E9/L (ref 4.5–11.5)
WBC UA: ABNORMAL /HPF (ref 0–5)
YEAST: PRESENT /HPF

## 2022-06-29 PROCEDURE — 6360000002 HC RX W HCPCS: Performed by: EMERGENCY MEDICINE

## 2022-06-29 PROCEDURE — 83690 ASSAY OF LIPASE: CPT

## 2022-06-29 PROCEDURE — 6370000000 HC RX 637 (ALT 250 FOR IP): Performed by: EMERGENCY MEDICINE

## 2022-06-29 PROCEDURE — 84484 ASSAY OF TROPONIN QUANT: CPT

## 2022-06-29 PROCEDURE — 93005 ELECTROCARDIOGRAM TRACING: CPT | Performed by: PHYSICIAN ASSISTANT

## 2022-06-29 PROCEDURE — 36415 COLL VENOUS BLD VENIPUNCTURE: CPT

## 2022-06-29 PROCEDURE — 96374 THER/PROPH/DIAG INJ IV PUSH: CPT

## 2022-06-29 PROCEDURE — 6360000004 HC RX CONTRAST MEDICATION: Performed by: RADIOLOGY

## 2022-06-29 PROCEDURE — 80053 COMPREHEN METABOLIC PANEL: CPT

## 2022-06-29 PROCEDURE — 81001 URINALYSIS AUTO W/SCOPE: CPT

## 2022-06-29 PROCEDURE — 96375 TX/PRO/DX INJ NEW DRUG ADDON: CPT

## 2022-06-29 PROCEDURE — 74177 CT ABD & PELVIS W/CONTRAST: CPT

## 2022-06-29 PROCEDURE — 87186 SC STD MICRODIL/AGAR DIL: CPT

## 2022-06-29 PROCEDURE — 83605 ASSAY OF LACTIC ACID: CPT

## 2022-06-29 PROCEDURE — 99283 EMERGENCY DEPT VISIT LOW MDM: CPT

## 2022-06-29 PROCEDURE — 82800 BLOOD PH: CPT

## 2022-06-29 PROCEDURE — 87088 URINE BACTERIA CULTURE: CPT

## 2022-06-29 PROCEDURE — 2580000003 HC RX 258: Performed by: EMERGENCY MEDICINE

## 2022-06-29 PROCEDURE — 99285 EMERGENCY DEPT VISIT HI MDM: CPT

## 2022-06-29 PROCEDURE — 2500000003 HC RX 250 WO HCPCS: Performed by: EMERGENCY MEDICINE

## 2022-06-29 PROCEDURE — A4216 STERILE WATER/SALINE, 10 ML: HCPCS | Performed by: EMERGENCY MEDICINE

## 2022-06-29 PROCEDURE — 96361 HYDRATE IV INFUSION ADD-ON: CPT

## 2022-06-29 PROCEDURE — 93005 ELECTROCARDIOGRAM TRACING: CPT | Performed by: EMERGENCY MEDICINE

## 2022-06-29 PROCEDURE — 82010 KETONE BODYS QUAN: CPT

## 2022-06-29 PROCEDURE — 85025 COMPLETE CBC W/AUTO DIFF WBC: CPT

## 2022-06-29 PROCEDURE — 87077 CULTURE AEROBIC IDENTIFY: CPT

## 2022-06-29 RX ORDER — 0.9 % SODIUM CHLORIDE 0.9 %
1000 INTRAVENOUS SOLUTION INTRAVENOUS ONCE
Status: COMPLETED | OUTPATIENT
Start: 2022-06-29 | End: 2022-06-29

## 2022-06-29 RX ORDER — ONDANSETRON 4 MG/1
4 TABLET, FILM COATED ORAL EVERY 8 HOURS PRN
COMMUNITY

## 2022-06-29 RX ORDER — PROMETHAZINE HYDROCHLORIDE 25 MG/1
25 TABLET ORAL EVERY 4 HOURS PRN
Qty: 16 TABLET | Refills: 0 | Status: SHIPPED | OUTPATIENT
Start: 2022-06-29 | End: 2022-07-06

## 2022-06-29 RX ORDER — ONDANSETRON 2 MG/ML
4 INJECTION INTRAMUSCULAR; INTRAVENOUS ONCE
Status: COMPLETED | OUTPATIENT
Start: 2022-06-29 | End: 2022-06-29

## 2022-06-29 RX ORDER — FENTANYL CITRATE 0.05 MG/ML
50 INJECTION, SOLUTION INTRAMUSCULAR; INTRAVENOUS ONCE
Status: COMPLETED | OUTPATIENT
Start: 2022-06-29 | End: 2022-06-29

## 2022-06-29 RX ORDER — FAMOTIDINE 20 MG/1
20 TABLET, FILM COATED ORAL 2 TIMES DAILY
Qty: 60 TABLET | Refills: 3 | Status: SHIPPED | OUTPATIENT
Start: 2022-06-29 | End: 2022-07-21 | Stop reason: ALTCHOICE

## 2022-06-29 RX ADMIN — SODIUM CHLORIDE 1000 ML: 9 INJECTION, SOLUTION INTRAVENOUS at 14:12

## 2022-06-29 RX ADMIN — IOPAMIDOL 50 ML: 755 INJECTION, SOLUTION INTRAVENOUS at 13:34

## 2022-06-29 RX ADMIN — ALUMINUM HYDROXIDE, MAGNESIUM HYDROXIDE, AND SIMETHICONE: 200; 200; 20 SUSPENSION ORAL at 14:07

## 2022-06-29 RX ADMIN — FAMOTIDINE 20 MG: 10 INJECTION, SOLUTION INTRAVENOUS at 14:01

## 2022-06-29 RX ADMIN — ONDANSETRON 4 MG: 2 INJECTION INTRAMUSCULAR; INTRAVENOUS at 12:04

## 2022-06-29 RX ADMIN — FENTANYL CITRATE 50 MCG: 50 INJECTION INTRAMUSCULAR; INTRAVENOUS at 12:04

## 2022-06-29 ASSESSMENT — PAIN SCALES - GENERAL
PAINLEVEL_OUTOF10: 7
PAINLEVEL_OUTOF10: 5
PAINLEVEL_OUTOF10: 6
PAINLEVEL_OUTOF10: 6
PAINLEVEL_OUTOF10: 5

## 2022-06-29 ASSESSMENT — ENCOUNTER SYMPTOMS
DIARRHEA: 1
ABDOMINAL PAIN: 1
VOMITING: 1
COUGH: 0
NAUSEA: 1
BLOOD IN STOOL: 0
EYES NEGATIVE: 1
CONSTIPATION: 0
HEMATEMESIS: 0
SHORTNESS OF BREATH: 0

## 2022-06-29 ASSESSMENT — PAIN DESCRIPTION - LOCATION
LOCATION: CHEST
LOCATION: CHEST

## 2022-06-29 ASSESSMENT — PAIN DESCRIPTION - FREQUENCY: FREQUENCY: INTERMITTENT

## 2022-06-29 ASSESSMENT — PAIN DESCRIPTION - ONSET: ONSET: GRADUAL

## 2022-06-29 ASSESSMENT — PAIN DESCRIPTION - ORIENTATION: ORIENTATION: ANTERIOR

## 2022-06-29 ASSESSMENT — PAIN - FUNCTIONAL ASSESSMENT
PAIN_FUNCTIONAL_ASSESSMENT: 0-10
PAIN_FUNCTIONAL_ASSESSMENT: 0-10

## 2022-06-29 ASSESSMENT — PAIN DESCRIPTION - PAIN TYPE: TYPE: ACUTE PAIN

## 2022-06-29 ASSESSMENT — PAIN DESCRIPTION - DESCRIPTORS: DESCRIPTORS: DISCOMFORT

## 2022-06-29 NOTE — ED NOTES
Iv established and labs drawn/sent, urine sent, medicated per orders, bed low/locked with side rails up and call light within reach, pt placed on cont pulse ox,      Majo Fuchs RN  06/29/22 [id number]

## 2022-06-29 NOTE — ED PROVIDER NOTES
Patient started with symptoms yesterday and was seen at [facility].  It was determined she had pancreatitis. She attempted outpatient treatment but states she can't keep fluids down. They did delta troponins yesterday and state no evidence of cardiac event. The history is provided by the patient. Abdominal Pain  Pain location:  Epigastric  Pain quality: sharp and shooting    Pain radiates to:  Back  Pain severity:  Moderate  Onset quality:  Gradual  Duration:  2 days  Timing:  Constant  Progression:  Worsening  Chronicity:  New  Context: previous surgery    Context: not recent illness and not sick contacts    Context comment:  Diabetic  Relieved by:  Nothing  Exacerbated by: drinking fluids. Ineffective treatments:  None tried  Associated symptoms: anorexia, chest pain, chills, diarrhea, nausea and vomiting    Associated symptoms: no constipation, no cough, no dysuria, no fatigue, no fever, no hematemesis, no hematuria and no shortness of breath        Review of Systems   Constitutional: Positive for activity change, appetite change and chills. Negative for fatigue and fever. HENT: Negative. Eyes: Negative. Respiratory: Negative for cough and shortness of breath. Cardiovascular: Positive for chest pain. Gastrointestinal: Positive for abdominal pain, anorexia, diarrhea, nausea and vomiting. Negative for blood in stool, constipation and hematemesis. Genitourinary: Negative for dysuria and hematuria. Musculoskeletal: Negative. Skin: Negative. Neurological: Negative for weakness and light-headedness. Physical Exam  Vitals and nursing note reviewed. Constitutional:       General: She is not in acute distress. Appearance: She is well-developed and normal weight. She is ill-appearing. She is not toxic-appearing. HENT:      Head: Normocephalic and atraumatic. Mouth/Throat:      Pharynx: Oropharynx is clear. No pharyngeal swelling.    Eyes:      Extraocular Movements: Extraocular movements intact. Pupils: Pupils are equal, round, and reactive to light. Cardiovascular:      Rate and Rhythm: Normal rate and regular rhythm. Heart sounds: Normal heart sounds. No murmur heard. Pulmonary:      Effort: Pulmonary effort is normal. No respiratory distress. Breath sounds: Normal breath sounds. No wheezing or rales. Abdominal:      General: Bowel sounds are normal.      Palpations: Abdomen is soft. Tenderness: There is abdominal tenderness in the epigastric area. There is no guarding or rebound. Musculoskeletal:      Cervical back: Normal range of motion and neck supple. Skin:     General: Skin is warm and dry. Capillary Refill: Capillary refill takes less than 2 seconds. Findings: No rash. Neurological:      Mental Status: She is alert and oriented to person, place, and time. Cranial Nerves: No cranial nerve deficit. Coordination: Coordination normal.         Procedures    MDM  EKG Interpretation    Interpreted by emergency department physician    Rhythm: normal sinus   Rate: normal  Axis: normal  Ectopy: none  Conduction: normal  ST Segments: normal  T Waves: normal  Q Waves: none    Clinical Impression: no acute changes    [address], DO    ED Course as of 06/29/22 1355 Wed Jun 29, 2022   1354 Discussed with the patient. At this time there is no evidence of pancreatitis and no elevation in her heart troponin. She is complaining of continued pain in the chest.  With reflux. We will provide her with a dose of GI cocktail as well as Pepcid and reevaluate. A repeat EKG has been ordered. [JS]      ED Course User Index  [JS] [address], DO        ED Course as of 06/29/22 1355 Wed Jun 29, 2022   1354 Discussed with the patient. At this time there is no evidence of pancreatitis and no elevation in her heart troponin. She is complaining of continued pain in the chest.  With reflux.   We will provide her with a dose of GI cocktail as well as Pepcid and reevaluate. A repeat EKG has been ordered. [JS]      ED Course User Index  [JS] Dane Post,      #2 EKG  EKG: normal EKG, normal sinus rhythm.    --------------------------------------------- PAST HISTORY ---------------------------------------------  Past Medical History:  has a past medical history of BRCA1 gene mutation positive, Breast cancer ([facility] Cancer ([facility] Diabetes mellitus ([facility] Headache(784.0), Hyperlipidemia, Hypertension, Kidney stone, Lumbar herniated disc, and Migraine. Past Surgical History:  has a past surgical history that includes Cholecystectomy; Tubal ligation; LEEP; Tonsillectomy; Tibia fracture surgery; Ankle surgery; Breast biopsy; Breast lumpectomy; and Hysterectomy. Social History:  reports that she has been smoking. She has a 0.50 pack-year smoking history. She has never used smokeless tobacco. She reports previous drug use. Drug: Marijuana Sonali Kos). She reports that she does not drink alcohol. Family History: family history includes Alcohol Abuse in her father; Asthma in her daughter and son; Brain Cancer in her daughter; Cancer in her father, maternal grandfather, maternal grandmother, paternal grandfather, and paternal grandmother; Diabetes in her maternal grandmother and mother; Heart Attack in her father; Hypotension in her father; Kidney Cancer in her maternal grandfather; Karel Morale in her father. The patients home medications have been reviewed. Allergies: Patient has no known allergies.     -------------------------------------------------- RESULTS -------------------------------------------------  Labs:  Results for orders placed or performed during the hospital encounter of 06/29/22   CBC with Auto Differential   Result Value Ref Range    WBC 11.5 4.5 - 11.5 E9/L    RBC 5.03 3.50 - 5.50 E12/L    Hemoglobin 14.8 11.5 - 15.5 g/dL    Hematocrit 44.6 34.0 - 48.0 %    MCV 88.7 80.0 - 99.9 fL    MCH 29.4 26.0 - 35.0 pg    MCHC 33.2 32.0 - 34.5 %    RDW 13.0 11.5 - 15.0 fL    Platelets 402 697 - 271 E9/L    MPV 11.3 7.0 - 12.0 fL    Neutrophils % 78.0 43.0 - 80.0 %    Immature Granulocytes % 0.3 0.0 - 5.0 %    Lymphocytes % 16.6 (L) 20.0 - 42.0 %    Monocytes % 3.3 2.0 - 12.0 %    Eosinophils % 1.2 0.0 - 6.0 %    Basophils % 0.6 0.0 - 2.0 %    Neutrophils Absolute 8.99 (H) 1.80 - 7.30 E9/L    Immature Granulocytes # 0.04 E9/L    Lymphocytes Absolute 1.91 1.50 - 4.00 E9/L    Monocytes Absolute 0.38 0.10 - 0.95 E9/L    Eosinophils Absolute 0.14 0.05 - 0.50 E9/L    Basophils Absolute 0.07 0.00 - 0.20 E9/L   Comprehensive Metabolic Panel w/ Reflex to MG   Result Value Ref Range    Sodium 138 132 - 146 mmol/L    Potassium reflex Magnesium 3.9 3.5 - 5.0 mmol/L    Chloride 103 98 - 107 mmol/L    CO2 26 22 - 29 mmol/L    Anion Gap 9 7 - 16 mmol/L    Glucose 289 (H) 74 - 99 mg/dL    BUN 9 6 - 20 mg/dL    CREATININE 0.6 0.5 - 1.0 mg/dL    GFR Non-African American >60 >=60 mL/min/1.73    GFR African American >60     Calcium 8.8 8.6 - 10.2 mg/dL    Total Protein 6.7 6.4 - 8.3 g/dL    Albumin 3.9 3.5 - 5.2 g/dL    Total Bilirubin 0.2 0.0 - 1.2 mg/dL    Alkaline Phosphatase 80 35 - 104 U/L    ALT 9 0 - 32 U/L    AST 12 0 - 31 U/L   Lipase   Result Value Ref Range    Lipase 41 13 - 60 U/L   Lactic Acid   Result Value Ref Range    Lactic Acid 1.5 0.5 - 2.2 mmol/L   Urinalysis with Microscopic   Result Value Ref Range    Color, UA Yellow Straw/Yellow    Clarity, UA Clear Clear    Glucose, Ur >=1000 (A) Negative mg/dL    Bilirubin Urine Negative Negative    Ketones, Urine >=80 (A) Negative mg/dL    Specific Gravity, UA >=1.030 1.005 - 1.030    Blood, Urine Negative Negative    pH, UA 6.0 5.0 - 9.0    Protein, UA >=300 (A) Negative mg/dL    Urobilinogen, Urine 0.2 <2.0 E.U./dL    Nitrite, Urine POSITIVE (A) Negative    Leukocyte Esterase, Urine Negative Negative    WBC, UA 2-5 0 - 5 /HPF    RBC, UA NONE 0 - 2 /HPF    Epithelial Cells, department.  --------------------------------- ADDITIONAL PROVIDER NOTES ---------------------------------  At this time the patient is without objective evidence of an acute process requiring hospitalization or inpatient management. They have remained hemodynamically stable throughout their entire ED visit and are stable for discharge with outpatient follow-up. The plan has been discussed in detail and they are aware of the specific conditions for emergent return, as well as the importance of follow-up. New Prescriptions    No medications on file       Diagnosis:  1. Abdominal pain, epigastric    2. Atypical chest pain        Disposition:  Patient's disposition: Discharge to home  Patient's condition is stable.          [address], DO  06/29/22 4297

## 2022-06-29 NOTE — ED PROVIDER NOTES
HPI:  6/29/22,   Time: 8:46 AM EDT         Jason Rico is a 42 y.o. female presenting to the ED for Chief Complaint: requesting different nausea medication. Patient states she was seen at [facility] yesterday/discharged with Zofran for her nausea and vomiting. According to patient Zofran is not helping much. According to patient, they told her that she might have some mild pancreatic inflammation but she did not want to be admitted. Patient has not contacted her primary care physician yet today. Still complaining of nausea vomiting. ROS:   Pertinent positives and negatives are stated within HPI, all other systems reviewed and are negative.  --------------------------------------------- PAST HISTORY ---------------------------------------------  Past Medical History:  has a past medical history of BRCA1 gene mutation positive, Breast cancer ([facility] Cancer ([facility] Diabetes mellitus ([facility] Headache(784.0), Hyperlipidemia, Hypertension, Kidney stone, Lumbar herniated disc, and Migraine. Past Surgical History:  has a past surgical history that includes Cholecystectomy; Tubal ligation; LEEP; Tonsillectomy; Tibia fracture surgery; Ankle surgery; Breast biopsy; Breast lumpectomy; and Hysterectomy. Social History:  reports that she has been smoking. She has a 0.50 pack-year smoking history. She has never used smokeless tobacco. She reports previous drug use. Drug: Marijuana Sharlene Maldonado). She reports that she does not drink alcohol. Family History: family history includes Alcohol Abuse in her father; Asthma in her daughter and son; Brain Cancer in her daughter; Cancer in her father, maternal grandfather, maternal grandmother, paternal grandfather, and paternal grandmother; Diabetes in her maternal grandmother and mother; Heart Attack in her father; Hypotension in her father; Kidney Cancer in her maternal grandfather; Robertson Bless in her father. The patients home medications have been reviewed.     Allergies: take 1 tablet by mouth once daily    B-D INS SYRINGE 0.5CC/31GX5/16 31G X 5/16\" 0.5 ML MISC        DULAGLUTIDE 1.5 MG/0.5ML SOPN    Inject 1.5 mg into the skin once a week    INSULIN DEGLUDEC (TRESIBA FLEXTOUCH) 100 UNIT/ML SOPN    Inject 36 Units into the skin in the morning and at bedtime    INSULIN PEN NEEDLE 32G X 6 MM MISC    1 Device by Does not apply route daily    LISINOPRIL (PRINIVIL;ZESTRIL) 20 MG TABLET    Take 1 tablet by mouth daily    METFORMIN (GLUCOPHAGE) 1000 MG TABLET    Take 1 tablet by mouth 2 times daily (with meals)    OMEPRAZOLE (PRILOSEC) 20 MG DELAYED RELEASE CAPSULE    omeprazole 20 mg capsule,delayed release  take 1 capsule by mouth twice a day as directed    ONDANSETRON (ZOFRAN) 4 MG TABLET    Take 4 mg by mouth every 8 hours as needed for Nausea or Vomiting    ONE TOUCH LANCETS MISC    Check blood sugar bid    TAMOXIFEN (NOLVADEX) 20 MG TABLET    Take 20 mg by mouth nightly   Modified Medications    No medications on file   Discontinued Medications    No medications on file         Counseling: The emergency provider has spoken with the patient and discussed todays results, in addition to providing specific details for the plan of care and counseling regarding the diagnosis and prognosis. Questions are answered at this time and they are agreeable with the plan.      --------------------------------- IMPRESSION AND DISPOSITION ---------------------------------    IMPRESSION  1.  Nausea and vomiting, intractability of vomiting not specified, unspecified vomiting type        DISPOSITION  Disposition: Discharge to home  Patient condition is good                  Jennifer Rojas MD  06/29/22 1412 [facility] Phyllis Healy MD  06/29/22 [id number]

## 2022-06-29 NOTE — ED NOTES
Department of Emergency Medicine  FIRST PROVIDER TRIAGE NOTE             Independent MLP           6/29/22  11:04 AM EDT    Date of Encounter: 6/29/22   MRN: [id number]      HPI: Maria Del Rosario Rangel is a 42 y.o. female who presents to the ED for Abdominal Pain (seen yesterday for chest pain at [facility] and dx wiht pancreatitis and was sent here today for admission from her PCP)  Patient reports that she did try fluids at home however she is having vomiting at this time. She reports that she is also having the chest pain and headache at this time. Patient reports that prior to yesterday she has never been diagnosed with pancreatitis before. She is a type II diabetic. ROS: Negative for sob, back pain, fever, urinary complaints or dizziness. PE: Gen Appearance/Constitutional: alert  CV: tachycardia  Pulm: CTA bilat  GI: tender to palpation     Initial Plan of Care: All treatment areas with department are currently occupied. Plan to order/Initiate the following while awaiting opening in ED: labs, EKG, imaging studies and IV fluids.   Initiate Treatment-Testing, Proceed toTreatment Area When Bed Available for ED Attending/MLP to Continue Care    Electronically signed by ALICE Drummond   DD: 6/29/22         Jennifer Drummond  06/29/22 3397

## 2022-06-29 NOTE — ED NOTES
Will discharge pt when fluids finished, fluids placed on pressure bag     Colton Ferreira RN  06/29/22 8559

## 2022-06-30 ENCOUNTER — TELEPHONE (OUTPATIENT)
Dept: PRIMARY CARE CLINIC | Age: 47
End: 2022-06-30

## 2022-06-30 RX ORDER — SUCRALFATE ORAL 1 G/10ML
1 SUSPENSION ORAL 4 TIMES DAILY
Qty: 1200 ML | Refills: 3 | Status: SHIPPED | OUTPATIENT
Start: 2022-06-30

## 2022-06-30 NOTE — TELEPHONE ENCOUNTER
Pt needs doctors note for dates from  June 28th return July 6th    email to [email or web address]      Pt states she will call dr Elicia Cheema for an appointment

## 2022-06-30 NOTE — LETTER
[address] [place] [address] PRIMARY CARE  [address] [place] [address] [address]. [facility] [address]  Phone: [phone]  Fax: [phone]    Ruth Tam MD        June 30, 2022     Patient: Miracle Joshua   YOB: 1975   Date of Visit: 6/30/2022       To Whom It May Concern: It is my medical opinion that Kylah Brunner should be excused from work June 28th-July 5th due to illness. She may return July 6th. If you have any questions or concerns, please don't hesitate to call.     Sincerely,        Ruth Tam MD

## 2022-07-01 LAB
ORGANISM: ABNORMAL
URINE CULTURE, ROUTINE: ABNORMAL

## 2022-07-21 ENCOUNTER — HOSPITAL ENCOUNTER (EMERGENCY)
Age: 47
Discharge: HOME | End: 2022-07-21
Attending: EMERGENCY MEDICINE
Payer: COMMERCIAL

## 2022-07-21 VITALS
RESPIRATION RATE: 18 BRPM | WEIGHT: 183 LBS | SYSTOLIC BLOOD PRESSURE: 121 MMHG | DIASTOLIC BLOOD PRESSURE: 79 MMHG | HEART RATE: 102 BPM | OXYGEN SATURATION: 99 % | BODY MASS INDEX: 28.66 KG/M2 | TEMPERATURE: 97.1 F

## 2022-07-21 DIAGNOSIS — N39.0 URINARY TRACT INFECTION WITHOUT HEMATURIA, SITE UNSPECIFIED: ICD-10-CM

## 2022-07-21 DIAGNOSIS — J06.9 ACUTE UPPER RESPIRATORY INFECTION: Primary | ICD-10-CM

## 2022-07-21 LAB
BILIRUBIN URINE: NEGATIVE
BLOOD, URINE: NEGATIVE
CLARITY: CLEAR
COLOR: YELLOW
GLUCOSE URINE: >=1000 MG/DL
KETONES, URINE: ABNORMAL MG/DL
LEUKOCYTE ESTERASE, URINE: NEGATIVE
NITRITE, URINE: NEGATIVE
PH UA: 5 (ref 5–9)
PROTEIN UA: NEGATIVE MG/DL
SPECIFIC GRAVITY UA: 1.02 (ref 1–1.03)
UROBILINOGEN, URINE: 0.2 E.U./DL

## 2022-07-21 PROCEDURE — 81003 URINALYSIS AUTO W/O SCOPE: CPT

## 2022-07-21 PROCEDURE — 99283 EMERGENCY DEPT VISIT LOW MDM: CPT

## 2022-07-21 RX ORDER — BROMPHENIRAMINE MALEATE, PSEUDOEPHEDRINE HYDROCHLORIDE, AND DEXTROMETHORPHAN HYDROBROMIDE 2; 30; 10 MG/5ML; MG/5ML; MG/5ML
5 SYRUP ORAL 4 TIMES DAILY PRN
Qty: 120 ML | Refills: 0 | Status: SHIPPED | OUTPATIENT
Start: 2022-07-21 | End: 2022-07-31

## 2022-07-21 RX ORDER — CEFDINIR 300 MG/1
300 CAPSULE ORAL 2 TIMES DAILY
Qty: 20 CAPSULE | Refills: 0 | Status: SHIPPED | OUTPATIENT
Start: 2022-07-21 | End: 2022-07-31

## 2022-07-21 ASSESSMENT — ENCOUNTER SYMPTOMS
COUGH: 1
BACK PAIN: 0
SORE THROAT: 0
ABDOMINAL DISTENTION: 0
NAUSEA: 0
WHEEZING: 0
SHORTNESS OF BREATH: 0
VOMITING: 0
SINUS PRESSURE: 0
EYE PAIN: 0
EYE REDNESS: 0
EYE DISCHARGE: 0
DIARRHEA: 0

## 2022-07-21 ASSESSMENT — PAIN DESCRIPTION - PAIN TYPE: TYPE: ACUTE PAIN

## 2022-07-21 ASSESSMENT — PAIN SCALES - GENERAL
PAINLEVEL_OUTOF10: 7
PAINLEVEL_OUTOF10: 7

## 2022-07-21 ASSESSMENT — PAIN DESCRIPTION - ORIENTATION: ORIENTATION: RIGHT

## 2022-07-21 ASSESSMENT — PAIN DESCRIPTION - ONSET: ONSET: GRADUAL

## 2022-07-21 ASSESSMENT — PAIN DESCRIPTION - DESCRIPTORS: DESCRIPTORS: ACHING

## 2022-07-21 ASSESSMENT — PAIN DESCRIPTION - FREQUENCY: FREQUENCY: INTERMITTENT

## 2022-07-21 ASSESSMENT — PAIN DESCRIPTION - LOCATION: LOCATION: FLANK

## 2022-07-21 ASSESSMENT — PAIN - FUNCTIONAL ASSESSMENT
PAIN_FUNCTIONAL_ASSESSMENT: 0-10
PAIN_FUNCTIONAL_ASSESSMENT: 0-10

## 2022-07-21 NOTE — ED PROVIDER NOTES
The history is provided by the patient. Illness   The current episode started 3 to 5 days ago. The onset was gradual. The problem has been unchanged. The problem is mild. Nothing relieves the symptoms. Nothing aggravates the symptoms. Associated symptoms include congestion, cough and URI. Pertinent negatives include no fever, no diarrhea, no nausea, no vomiting, no ear pain, no headaches, no sore throat, no wheezing, no rash, no eye discharge, no eye pain and no eye redness. She has been Behaving normally. She has been Eating and drinking normally. Urine output has been normal. The last void occurred Less than 6 hours ago. She has received no recent medical care. Review of Systems   Constitutional:  Negative for chills and fever. HENT:  Positive for congestion. Negative for ear pain, sinus pressure and sore throat. Eyes:  Negative for pain, discharge and redness. Respiratory:  Positive for cough. Negative for shortness of breath and wheezing. Cardiovascular:  Negative for chest pain. Gastrointestinal:  Negative for abdominal distention, diarrhea, nausea and vomiting. Genitourinary:  Positive for flank pain. Negative for dysuria and frequency. Musculoskeletal:  Negative for arthralgias and back pain. Skin:  Negative for rash and wound. Neurological:  Negative for weakness and headaches. Hematological:  Negative for adenopathy. Psychiatric/Behavioral: Negative. All other systems reviewed and are negative. Physical Exam  Vitals and nursing note reviewed. Constitutional:       Appearance: She is well-developed. HENT:      Head: Normocephalic and atraumatic. Right Ear: Tympanic membrane normal.      Left Ear: Tympanic membrane normal.      Nose: Congestion and rhinorrhea present. Eyes:      Pupils: Pupils are equal, round, and reactive to light. Cardiovascular:      Rate and Rhythm: Regular rhythm. Tachycardia present. Heart sounds: Normal heart sounds.  No murmur heard.  Pulmonary:      Effort: Pulmonary effort is normal.      Breath sounds: Normal breath sounds. Abdominal:      General: Bowel sounds are normal.      Palpations: Abdomen is soft. Tenderness: There is no abdominal tenderness. There is no guarding or rebound. Musculoskeletal:      Cervical back: Normal range of motion and neck supple. Skin:     General: Skin is warm and dry. Neurological:      Mental Status: She is alert and oriented to person, place, and time. Psychiatric:         Behavior: Behavior normal.         Thought Content: Thought content normal.         Judgment: Judgment normal.      --------------------------------------------- PAST HISTORY ---------------------------------------------  Past Medical History:  has a past medical history of BRCA1 gene mutation positive, Breast cancer ([facility] Cancer [facility] Diabetes mellitus ([facility] Headache(784.0), Hyperlipidemia, Hypertension, Kidney stone, Lumbar herniated disc, Migraine, and Pancreatitis. Past Surgical History:  has a past surgical history that includes Cholecystectomy; Tubal ligation; LEEP; Tonsillectomy; Tibia fracture surgery; Ankle surgery; Breast biopsy; Breast lumpectomy; and Hysterectomy. Social History:  reports that she has been smoking cigarettes. She has a 0.50 pack-year smoking history. She has never used smokeless tobacco. She reports that she does not currently use drugs after having used the following drugs: Marijuana Burnett Cheikh). She reports that she does not drink alcohol. Family History: family history includes Alcohol Abuse in her father; Asthma in her daughter and son; Brain Cancer in her daughter; Cancer in her father, maternal grandfather, maternal grandmother, paternal grandfather, and paternal grandmother; Diabetes in her maternal grandmother and mother; Heart Attack in her father; Hypotension in her father; Kidney Cancer in her maternal grandfather; Dene Ganser in her father.      The patients home medications have been reviewed. Allergies: Patient has no known allergies. -------------------------------------------------- RESULTS -------------------------------------------------  Results for orders placed or performed during the hospital encounter of 07/21/22   Urinalysis   Result Value Ref Range    Color, UA Yellow Straw/Yellow    Clarity, UA Clear Clear    Glucose, Ur >=1000 (A) Negative mg/dL    Bilirubin Urine Negative Negative    Ketones, Urine TRACE (A) Negative mg/dL    Specific Gravity, UA 1.020 1.005 - 1.030    Blood, Urine Negative Negative    pH, UA 5.0 5.0 - 9.0    Protein, UA Negative Negative mg/dL    Urobilinogen, Urine 0.2 <2.0 E.U./dL    Nitrite, Urine Negative Negative    Leukocyte Esterase, Urine Negative Negative     No orders to display       ------------------------- NURSING NOTES AND VITALS REVIEWED ---------------------------   The nursing notes within the ED encounter and vital signs as below have been reviewed. /79   Pulse (!) 102   Temp 97.1 °F (36.2 °C) (Temporal)   Resp 18   Wt 183 lb (83 kg)   LMP 08/16/2011   SpO2 99%   BMI 28.66 kg/m²   Oxygen Saturation Interpretation: Normal      ------------------------------------------ PROGRESS NOTES ------------------------------------------   I have spoken with the patient and discussed todays results, in addition to providing specific details for the plan of care and counseling regarding the diagnosis and prognosis. Their questions are answered at this time and they are agreeable with the plan.      --------------------------------- ADDITIONAL PROVIDER NOTES ---------------------------------        This patient is stable for discharge. I have shared the specific conditions for return, as well as the importance of follow-up. IMPRESSION:     1. Acute upper respiratory infection    2.  Urinary tract infection without hematuria, site unspecified      Patient's Medications   New Prescriptions BROMPHENIRAMINE-PSEUDOEPHEDRINE-DM 2-30-10 MG/5ML SYRUP    Take 5 mLs by mouth 4 times daily as needed for Congestion or Cough    CEFDINIR (OMNICEF) 300 MG CAPSULE    Take 1 capsule by mouth in the morning and 1 capsule before bedtime. Do all this for 10 days.    Previous Medications    ATORVASTATIN (LIPITOR) 40 MG TABLET    take 1 tablet by mouth once daily    B-D INS SYRINGE 0.5CC/31GX5/16 31G X 5/16\" 0.5 ML MISC        DULAGLUTIDE 1.5 MG/0.5ML SOPN    Inject 1.5 mg into the skin once a week    INSULIN DEGLUDEC (TRESIBA FLEXTOUCH) 100 UNIT/ML SOPN    Inject 36 Units into the skin in the morning and at bedtime    INSULIN PEN NEEDLE 32G X 6 MM MISC    1 Device by Does not apply route daily    LISINOPRIL (PRINIVIL;ZESTRIL) 20 MG TABLET    Take 1 tablet by mouth daily    METFORMIN (GLUCOPHAGE) 1000 MG TABLET    Take 1 tablet by mouth 2 times daily (with meals)    OMEPRAZOLE (PRILOSEC) 20 MG DELAYED RELEASE CAPSULE    omeprazole 20 mg capsule,delayed release  take 1 capsule by mouth twice a day as directed    ONDANSETRON (ZOFRAN) 4 MG TABLET    Take 4 mg by mouth every 8 hours as needed for Nausea or Vomiting    ONE TOUCH LANCETS MISC    Check blood sugar bid    SUCRALFATE (CARAFATE) 1 GM/10ML SUSPENSION    Take 10 mLs by mouth 4 times daily    TAMOXIFEN (NOLVADEX) 20 MG TABLET    Take 20 mg by mouth nightly   Modified Medications    No medications on file   Discontinued Medications    FAMOTIDINE (PEPCID) 20 MG TABLET    Take 1 tablet by mouth 2 times daily       Procedures     [facility]                   Darlene De La Cruz, DO  07/21/22 7514

## 2022-08-01 ENCOUNTER — HOSPITAL ENCOUNTER (OUTPATIENT)
Dept: MAMMOGRAPHY | Age: 47
Discharge: HOME | End: 2022-08-03
Payer: COMMERCIAL

## 2022-08-01 ENCOUNTER — HOSPITAL ENCOUNTER (OUTPATIENT)
Dept: ULTRASOUND IMAGING | Age: 47
Discharge: HOME | End: 2022-08-01
Payer: COMMERCIAL

## 2022-08-01 VITALS — WEIGHT: 200 LBS | HEIGHT: 67 IN | BODY MASS INDEX: 31.39 KG/M2

## 2022-08-01 DIAGNOSIS — C50.511 MALIGNANT NEOPLASM OF LOWER-OUTER QUADRANT OF RIGHT FEMALE BREAST, UNSPECIFIED ESTROGEN RECEPTOR STATUS (HCC): ICD-10-CM

## 2022-08-01 PROCEDURE — G0279 TOMOSYNTHESIS, MAMMO: HCPCS

## 2022-08-01 PROCEDURE — 76642 ULTRASOUND BREAST LIMITED: CPT

## 2022-08-24 ENCOUNTER — HOSPITAL ENCOUNTER (EMERGENCY)
Age: 47
Discharge: HOME | End: 2022-08-24
Payer: COMMERCIAL

## 2022-08-24 VITALS
HEIGHT: 67 IN | HEART RATE: 99 BPM | BODY MASS INDEX: 29.03 KG/M2 | RESPIRATION RATE: 18 BRPM | TEMPERATURE: 98.7 F | WEIGHT: 185 LBS | DIASTOLIC BLOOD PRESSURE: 89 MMHG | OXYGEN SATURATION: 99 % | SYSTOLIC BLOOD PRESSURE: 123 MMHG

## 2022-08-24 DIAGNOSIS — N39.0 URINARY TRACT INFECTION WITHOUT HEMATURIA, SITE UNSPECIFIED: Primary | ICD-10-CM

## 2022-08-24 LAB
BACTERIA: ABNORMAL /HPF
BILIRUBIN URINE: NEGATIVE
BLOOD, URINE: ABNORMAL
CLARITY: ABNORMAL
COLOR: YELLOW
EPITHELIAL CELLS, UA: ABNORMAL /HPF
GLUCOSE URINE: >=1000 MG/DL
KETONES, URINE: NEGATIVE MG/DL
LEUKOCYTE ESTERASE, URINE: NEGATIVE
NITRITE, URINE: NEGATIVE
PH UA: 7 (ref 5–9)
PROTEIN UA: NEGATIVE MG/DL
RBC UA: ABNORMAL /HPF (ref 0–2)
SPECIFIC GRAVITY UA: 1.02 (ref 1–1.03)
UROBILINOGEN, URINE: 0.2 E.U./DL
WBC UA: ABNORMAL /HPF (ref 0–5)

## 2022-08-24 PROCEDURE — 81001 URINALYSIS AUTO W/SCOPE: CPT

## 2022-08-24 PROCEDURE — 99211 OFF/OP EST MAY X REQ PHY/QHP: CPT

## 2022-08-24 RX ORDER — NITROFURANTOIN 25; 75 MG/1; MG/1
100 CAPSULE ORAL 2 TIMES DAILY
Qty: 10 CAPSULE | Refills: 0 | Status: SHIPPED | OUTPATIENT
Start: 2022-08-24 | End: 2022-08-29

## 2022-08-24 ASSESSMENT — PAIN DESCRIPTION - ORIENTATION: ORIENTATION: LOWER

## 2022-08-24 ASSESSMENT — PAIN DESCRIPTION - LOCATION: LOCATION: BACK

## 2022-08-24 ASSESSMENT — PAIN SCALES - GENERAL: PAINLEVEL_OUTOF10: 2

## 2022-08-24 ASSESSMENT — PAIN DESCRIPTION - DESCRIPTORS: DESCRIPTORS: ACHING;DISCOMFORT

## 2022-08-24 ASSESSMENT — PAIN - FUNCTIONAL ASSESSMENT: PAIN_FUNCTIONAL_ASSESSMENT: 0-10

## 2022-08-24 NOTE — ED PROVIDER NOTES
History: family history includes Alcohol Abuse in her father; Asthma in her daughter and son; Brain Cancer in her daughter; Cancer in her father, maternal grandfather, maternal grandmother, paternal grandfather, and paternal grandmother; Diabetes in her maternal grandmother and mother; Heart Attack in her father; Hypotension in her father; Kidney Cancer in her maternal grandfather; [place] in her father. Allergies: Patient has no known allergies. Physical Exam      Oxygen Saturation Interpretation: Normal.        ED Triage Vitals [08/24/22 1015]   BP Temp Temp Source Heart Rate Resp SpO2 Height Weight   123/89 98.7 °F (37.1 °C) Infrared 99 18 99 % 5' 7\" (1.702 m) 185 lb (83.9 kg)         Constitutional:  Alert, development consistent with age. HEENT:  NC/NT. Airway patent. Neck:  Normal ROM. Supple. Respiratory:  Lungs Clear to auscultation and breath sounds equal.  CV:  Regular rate and rhythm, normal heart sounds, without pathological murmurs, ectopy, gallops, or rubs. GI:  .soft, not tender       Back: CVA Tenderness: No CVA tenderness. Integument:  Normal turgor. Warm, dry, without visible rash, unless noted elsewhere. Lymphatics: No lymphangitis or adenopathy noted. Neurological:  Oriented. Motor functions intact. Lab / Imaging Results   (All laboratory and radiology results have been personally reviewed by myself)  Labs:  No results found for this visit on 08/24/22. Imaging: All Radiology results interpreted by Radiologist unless otherwise noted. No orders to display     ED Course / Medical Decision Making   Medications - No data to display       Consults:   None    Medical Decision Making:    Patient is well appearing, non toxic and appropriate for outpatient management. Plan is for symptom management and PCP follow up. She should follow-up with her urologist as scheduled I did put her on Macrobid.   Advised her to drink plenty of fluids    Plan of Care/Counseling:  I reviewed today's visit with the patient in addition to providing specific details for the plan of care and counseling regarding the diagnosis and prognosis. Questions are answered at this time and are agreeable with the plan. Assessment    No diagnosis found. Plan   Disposition: Discharge to home and advised to contact No follow-up provider specified. Patient condition is good    New Medications     New Prescriptions    No medications on file     Electronically signed by ENEDINA Morales CNP   DD: 8/24/22  **This report was transcribed using voice recognition software. Every effort was made to ensure accuracy; however, inadvertent computerized transcription errors may be present.   END OF ED PROVIDER NOTE            ENEDINA Morales CNP  08/24/22 1115

## 2022-08-27 ENCOUNTER — HOSPITAL ENCOUNTER (OUTPATIENT)
Dept: CT IMAGING | Age: 47
Discharge: HOME | End: 2022-08-27
Payer: COMMERCIAL

## 2022-08-27 DIAGNOSIS — C50.511 MALIGNANT NEOPLASM OF LOWER-OUTER QUADRANT OF RIGHT FEMALE BREAST, UNSPECIFIED ESTROGEN RECEPTOR STATUS (HCC): ICD-10-CM

## 2022-08-27 LAB
BUN BLDV-MCNC: 10 MG/DL (ref 6–20)
CREAT SERPL-MCNC: 0.6 MG/DL (ref 0.5–1)
GFR AFRICAN AMERICAN: >60
GFR NON-AFRICAN AMERICAN: >60 ML/MIN/1.73

## 2022-08-27 PROCEDURE — 71260 CT THORAX DX C+: CPT

## 2022-08-27 PROCEDURE — 6360000004 HC RX CONTRAST MEDICATION: Performed by: RADIOLOGY

## 2022-08-27 PROCEDURE — 36415 COLL VENOUS BLD VENIPUNCTURE: CPT

## 2022-08-27 PROCEDURE — 84520 ASSAY OF UREA NITROGEN: CPT

## 2022-08-27 PROCEDURE — 82565 ASSAY OF CREATININE: CPT

## 2022-08-27 RX ADMIN — IOPAMIDOL 75 ML: 755 INJECTION, SOLUTION INTRAVENOUS at 12:17

## 2022-09-19 ENCOUNTER — HOSPITAL ENCOUNTER (EMERGENCY)
Age: 47
Discharge: HOME | End: 2022-09-19
Attending: EMERGENCY MEDICINE
Payer: COMMERCIAL

## 2022-09-19 ENCOUNTER — APPOINTMENT (OUTPATIENT)
Dept: GENERAL RADIOLOGY | Age: 47
End: 2022-09-19
Payer: COMMERCIAL

## 2022-09-19 VITALS
RESPIRATION RATE: 16 BRPM | SYSTOLIC BLOOD PRESSURE: 122 MMHG | WEIGHT: 175 LBS | TEMPERATURE: 97.5 F | BODY MASS INDEX: 27.47 KG/M2 | HEART RATE: 77 BPM | HEIGHT: 67 IN | DIASTOLIC BLOOD PRESSURE: 76 MMHG | OXYGEN SATURATION: 95 %

## 2022-09-19 DIAGNOSIS — B34.9 VIRAL SYNDROME: Primary | ICD-10-CM

## 2022-09-19 LAB — SARS-COV-2, NAAT: NOT DETECTED

## 2022-09-19 PROCEDURE — 71046 X-RAY EXAM CHEST 2 VIEWS: CPT

## 2022-09-19 PROCEDURE — 87635 SARS-COV-2 COVID-19 AMP PRB: CPT

## 2022-09-19 PROCEDURE — 99284 EMERGENCY DEPT VISIT MOD MDM: CPT

## 2022-09-19 ASSESSMENT — ENCOUNTER SYMPTOMS
CHEST TIGHTNESS: 0
SINUS PRESSURE: 0
SHORTNESS OF BREATH: 0
ABDOMINAL PAIN: 0
TROUBLE SWALLOWING: 0
DIARRHEA: 0
COUGH: 1
SORE THROAT: 0
EYE PAIN: 0
NAUSEA: 0
WHEEZING: 0
EYE DISCHARGE: 0
VOMITING: 0
BACK PAIN: 0

## 2022-09-19 NOTE — ED PROVIDER NOTES
Chief complaint:  Fatigue    HPI history provided by the patient  Patient presents here complaining of generalized fatigue and weakness starting yesterday with some body aches and nasal congestion with runny nose and a mild cough. No shortness of breath. No headache or stiff neck. No abdominal pain. No vomiting or diarrhea. No rash. No flank pain, hematuria or dysuria. No treatment prior to arrival.  Exertion makes her worse. Review of Systems   Constitutional:  Positive for activity change, chills and fatigue. Negative for diaphoresis and fever. HENT:  Positive for congestion and postnasal drip. Negative for ear pain, sinus pressure, sore throat and trouble swallowing. Eyes:  Negative for pain, discharge and visual disturbance. Respiratory:  Positive for cough. Negative for chest tightness, shortness of breath and wheezing. Cardiovascular:  Negative for chest pain, palpitations and leg swelling. Gastrointestinal:  Negative for abdominal pain, diarrhea, nausea and vomiting. Genitourinary:  Negative for dysuria, flank pain, frequency and urgency. Musculoskeletal:  Positive for arthralgias and myalgias. Negative for back pain, gait problem, joint swelling, neck pain and neck stiffness. Skin:  Negative for rash and wound. Neurological:  Negative for dizziness, seizures, syncope, facial asymmetry, weakness, light-headedness, numbness and headaches. All other systems reviewed and are negative. Physical Exam  Vitals and nursing note reviewed. Constitutional:       General: She is awake. She is not in acute distress. Appearance: She is well-developed. She is not ill-appearing, toxic-appearing or diaphoretic. HENT:      Head: Normocephalic and atraumatic. Right Ear: Ear canal and external ear normal.      Left Ear: Ear canal and external ear normal.      Nose: Mucosal edema and congestion present. No rhinorrhea. Mouth/Throat:      Pharynx: Oropharynx is clear.  Uvula midline. No pharyngeal swelling, oropharyngeal exudate, posterior oropharyngeal erythema or uvula swelling. Tonsils: No tonsillar exudate or tonsillar abscesses. Eyes:      General: No scleral icterus. Extraocular Movements: Extraocular movements intact. Conjunctiva/sclera: Conjunctivae normal.      Pupils: Pupils are equal, round, and reactive to light. Neck:      Trachea: Trachea and phonation normal.   Cardiovascular:      Rate and Rhythm: Normal rate and regular rhythm. Heart sounds: Normal heart sounds. No murmur heard. Pulmonary:      Effort: Pulmonary effort is normal. No respiratory distress. Breath sounds: Normal breath sounds. No stridor, decreased air movement or transmitted upper airway sounds. No decreased breath sounds, wheezing, rhonchi or rales. Chest:      Chest wall: No tenderness. Abdominal:      General: Bowel sounds are normal. There is no distension. Palpations: Abdomen is soft. Tenderness: There is no abdominal tenderness. There is no right CVA tenderness, left CVA tenderness, guarding or rebound. Musculoskeletal:         General: No swelling, tenderness, deformity or signs of injury. Cervical back: Full passive range of motion without pain, normal range of motion and neck supple. No spinous process tenderness or muscular tenderness. Right lower leg: No edema. Left lower leg: No edema. Comments: Arms and legs are neurovascular intact with no pretibial edema or calf pain. Skin:     General: Skin is warm and dry. Coloration: Skin is not cyanotic, jaundiced, mottled or pale. Findings: No bruising, erythema or rash. Neurological:      General: No focal deficit present. Mental Status: She is alert and oriented to person, place, and time. GCS: GCS eye subscore is 4. GCS verbal subscore is 5. GCS motor subscore is 6. Cranial Nerves: Cranial nerves are intact. No cranial nerve deficit.       Sensory: Sensation Radiology:  XR CHEST (2 VW)   Final Result   No acute process. ------------------------- NURSING NOTES AND VITALS REVIEWED ---------------------------  Date / Time Roomed:  9/19/2022  7:41 AM  ED Bed Assignment:  22/22    The nursing notes within the ED encounter and vital signs as below have been reviewed. /76   Pulse 77   Temp 97.5 °F (36.4 °C)   Resp 16   Ht 5' 7\" (1.702 m)   Wt 175 lb (79.4 kg)   LMP 08/16/2011   SpO2 95%   BMI 27.41 kg/m²   Oxygen Saturation Interpretation: Normal      ------------------------------------------ PROGRESS NOTES ------------------------------------------  I have spoken with the patient and discussed todays results, in addition to providing specific details for the plan of care and counseling regarding the diagnosis and prognosis. Their questions are answered at this time and they are agreeable with the plan. I discussed at length with them reasons for immediate return here for re evaluation. They will followup with primary care by calling their office tomorrow. --------------------------------- ADDITIONAL PROVIDER NOTES ---------------------------------  At this time the patient is without objective evidence of an acute process requiring hospitalization or inpatient management. They have remained hemodynamically stable throughout their entire ED visit and are stable for discharge with outpatient follow-up. The plan has been discussed in detail and they are aware of the specific conditions for emergent return, as well as the importance of follow-up. New Prescriptions    No medications on file       Diagnosis:  1. Viral syndrome        Disposition:  Patient's disposition: Discharge to home  Patient's condition is stable.            Louisa Whitehead, DO  09/19/22 6735

## 2022-12-16 ENCOUNTER — APPOINTMENT (OUTPATIENT)
Dept: GENERAL RADIOLOGY | Age: 47
DRG: 481 | End: 2022-12-16
Payer: MEDICAID

## 2022-12-16 ENCOUNTER — ANESTHESIA (OUTPATIENT)
Dept: OPERATING ROOM | Age: 47
End: 2022-12-16

## 2022-12-16 ENCOUNTER — APPOINTMENT (OUTPATIENT)
Dept: CT IMAGING | Age: 47
DRG: 481 | End: 2022-12-16
Payer: MEDICAID

## 2022-12-16 ENCOUNTER — HOSPITAL ENCOUNTER (INPATIENT)
Age: 47
LOS: 5 days | Discharge: HOME HEALTH | DRG: 481 | End: 2022-12-21
Attending: STUDENT IN AN ORGANIZED HEALTH CARE EDUCATION/TRAINING PROGRAM | Admitting: INTERNAL MEDICINE
Payer: MEDICAID

## 2022-12-16 ENCOUNTER — ANESTHESIA EVENT (OUTPATIENT)
Dept: OPERATING ROOM | Age: 47
End: 2022-12-16

## 2022-12-16 DIAGNOSIS — R73.9 HYPERGLYCEMIA: Primary | ICD-10-CM

## 2022-12-16 DIAGNOSIS — S72.142A INTERTROCHANTERIC FRACTURE OF LEFT HIP, CLOSED, INITIAL ENCOUNTER (HCC): ICD-10-CM

## 2022-12-16 DIAGNOSIS — S72.142A CLOSED INTERTROCHANTERIC FRACTURE OF HIP, LEFT, INITIAL ENCOUNTER (HCC): ICD-10-CM

## 2022-12-16 DIAGNOSIS — S72.142A CLOSED DISPLACED INTERTROCHANTERIC FRACTURE OF LEFT FEMUR, INITIAL ENCOUNTER (HCC): ICD-10-CM

## 2022-12-16 LAB
ABO/RH: NORMAL
ANION GAP SERPL CALCULATED.3IONS-SCNC: 13 MMOL/L (ref 7–16)
ANTIBODY SCREEN: NORMAL
APTT: 35.1 SEC (ref 24.5–35.1)
BASOPHILS ABSOLUTE: 0.06 E9/L (ref 0–0.2)
BASOPHILS RELATIVE PERCENT: 0.4 % (ref 0–2)
BUN BLDV-MCNC: 10 MG/DL (ref 6–20)
CALCIUM SERPL-MCNC: 9.2 MG/DL (ref 8.6–10.2)
CHLORIDE BLD-SCNC: 101 MMOL/L (ref 98–107)
CO2: 21 MMOL/L (ref 22–29)
CREAT SERPL-MCNC: 0.5 MG/DL (ref 0.5–1)
EKG ATRIAL RATE: 99 BPM
EKG P AXIS: 50 DEGREES
EKG P-R INTERVAL: 140 MS
EKG Q-T INTERVAL: 352 MS
EKG QRS DURATION: 86 MS
EKG QTC CALCULATION (BAZETT): 451 MS
EKG R AXIS: 53 DEGREES
EKG T AXIS: 31 DEGREES
EKG VENTRICULAR RATE: 99 BPM
EOSINOPHILS ABSOLUTE: 0.06 E9/L (ref 0.05–0.5)
EOSINOPHILS RELATIVE PERCENT: 0.4 % (ref 0–6)
GFR SERPL CREATININE-BSD FRML MDRD: >60 ML/MIN/1.73
GLUCOSE BLD-MCNC: 471 MG/DL (ref 74–99)
HCT VFR BLD CALC: 36.6 % (ref 34–48)
HCT VFR BLD CALC: 44.9 % (ref 34–48)
HEMOGLOBIN: 11.8 G/DL (ref 11.5–15.5)
HEMOGLOBIN: 15.5 G/DL (ref 11.5–15.5)
IMMATURE GRANULOCYTES #: 0.14 E9/L
IMMATURE GRANULOCYTES %: 1 % (ref 0–5)
LYMPHOCYTES ABSOLUTE: 1.87 E9/L (ref 1.5–4)
LYMPHOCYTES RELATIVE PERCENT: 14 % (ref 20–42)
MCH RBC QN AUTO: 30.3 PG (ref 26–35)
MCHC RBC AUTO-ENTMCNC: 34.5 % (ref 32–34.5)
MCV RBC AUTO: 87.7 FL (ref 80–99.9)
METER GLUCOSE: 274 MG/DL (ref 74–99)
METER GLUCOSE: 285 MG/DL (ref 74–99)
METER GLUCOSE: 403 MG/DL (ref 74–99)
MONOCYTES ABSOLUTE: 0.53 E9/L (ref 0.1–0.95)
MONOCYTES RELATIVE PERCENT: 4 % (ref 2–12)
NEUTROPHILS ABSOLUTE: 10.71 E9/L (ref 1.8–7.3)
NEUTROPHILS RELATIVE PERCENT: 80.2 % (ref 43–80)
PDW BLD-RTO: 13.6 FL (ref 11.5–15)
PLATELET # BLD: 272 E9/L (ref 130–450)
PMV BLD AUTO: 11.5 FL (ref 7–12)
POTASSIUM SERPL-SCNC: 4.7 MMOL/L (ref 3.5–5)
PRO-BNP: 145 PG/ML (ref 0–125)
RBC # BLD: 5.12 E12/L (ref 3.5–5.5)
SODIUM BLD-SCNC: 135 MMOL/L (ref 132–146)
WBC # BLD: 13.4 E9/L (ref 4.5–11.5)

## 2022-12-16 PROCEDURE — 70450 CT HEAD/BRAIN W/O DYE: CPT

## 2022-12-16 PROCEDURE — 85018 HEMOGLOBIN: CPT

## 2022-12-16 PROCEDURE — 93005 ELECTROCARDIOGRAM TRACING: CPT

## 2022-12-16 PROCEDURE — 2580000003 HC RX 258: Performed by: STUDENT IN AN ORGANIZED HEALTH CARE EDUCATION/TRAINING PROGRAM

## 2022-12-16 PROCEDURE — 86900 BLOOD TYPING SEROLOGIC ABO: CPT

## 2022-12-16 PROCEDURE — 3600000014 HC SURGERY LEVEL 4 ADDTL 15MIN: Performed by: ORTHOPAEDIC SURGERY

## 2022-12-16 PROCEDURE — 73502 X-RAY EXAM HIP UNI 2-3 VIEWS: CPT

## 2022-12-16 PROCEDURE — 85014 HEMATOCRIT: CPT

## 2022-12-16 PROCEDURE — 83880 ASSAY OF NATRIURETIC PEPTIDE: CPT

## 2022-12-16 PROCEDURE — 99285 EMERGENCY DEPT VISIT HI MDM: CPT

## 2022-12-16 PROCEDURE — 2500000003 HC RX 250 WO HCPCS

## 2022-12-16 PROCEDURE — 2720000010 HC SURG SUPPLY STERILE: Performed by: ORTHOPAEDIC SURGERY

## 2022-12-16 PROCEDURE — 72125 CT NECK SPINE W/O DYE: CPT

## 2022-12-16 PROCEDURE — 6360000002 HC RX W HCPCS: Performed by: STUDENT IN AN ORGANIZED HEALTH CARE EDUCATION/TRAINING PROGRAM

## 2022-12-16 PROCEDURE — 99222 1ST HOSP IP/OBS MODERATE 55: CPT | Performed by: INTERNAL MEDICINE

## 2022-12-16 PROCEDURE — 86901 BLOOD TYPING SEROLOGIC RH(D): CPT

## 2022-12-16 PROCEDURE — 3600000004 HC SURGERY LEVEL 4 BASE: Performed by: ORTHOPAEDIC SURGERY

## 2022-12-16 PROCEDURE — 6370000000 HC RX 637 (ALT 250 FOR IP): Performed by: STUDENT IN AN ORGANIZED HEALTH CARE EDUCATION/TRAINING PROGRAM

## 2022-12-16 PROCEDURE — 85730 THROMBOPLASTIN TIME PARTIAL: CPT

## 2022-12-16 PROCEDURE — 6360000002 HC RX W HCPCS

## 2022-12-16 PROCEDURE — 6360000002 HC RX W HCPCS: Performed by: ANESTHESIOLOGY

## 2022-12-16 PROCEDURE — 86850 RBC ANTIBODY SCREEN: CPT

## 2022-12-16 PROCEDURE — 93010 ELECTROCARDIOGRAM REPORT: CPT | Performed by: INTERNAL MEDICINE

## 2022-12-16 PROCEDURE — 82962 GLUCOSE BLOOD TEST: CPT

## 2022-12-16 PROCEDURE — C1713 ANCHOR/SCREW BN/BN,TIS/BN: HCPCS | Performed by: ORTHOPAEDIC SURGERY

## 2022-12-16 PROCEDURE — 2580000003 HC RX 258

## 2022-12-16 PROCEDURE — 73552 X-RAY EXAM OF FEMUR 2/>: CPT

## 2022-12-16 PROCEDURE — 6370000000 HC RX 637 (ALT 250 FOR IP): Performed by: INTERNAL MEDICINE

## 2022-12-16 PROCEDURE — 6360000002 HC RX W HCPCS: Performed by: NURSE ANESTHETIST, CERTIFIED REGISTERED

## 2022-12-16 PROCEDURE — C1769 GUIDE WIRE: HCPCS | Performed by: ORTHOPAEDIC SURGERY

## 2022-12-16 PROCEDURE — 36415 COLL VENOUS BLD VENIPUNCTURE: CPT

## 2022-12-16 PROCEDURE — 2500000003 HC RX 250 WO HCPCS: Performed by: NURSE ANESTHETIST, CERTIFIED REGISTERED

## 2022-12-16 PROCEDURE — 3700000001 HC ADD 15 MINUTES (ANESTHESIA): Performed by: ORTHOPAEDIC SURGERY

## 2022-12-16 PROCEDURE — 2580000003 HC RX 258: Performed by: NURSE ANESTHETIST, CERTIFIED REGISTERED

## 2022-12-16 PROCEDURE — 6360000002 HC RX W HCPCS: Performed by: ORTHOPAEDIC SURGERY

## 2022-12-16 PROCEDURE — 6370000000 HC RX 637 (ALT 250 FOR IP): Performed by: ORTHOPAEDIC SURGERY

## 2022-12-16 PROCEDURE — 96374 THER/PROPH/DIAG INJ IV PUSH: CPT

## 2022-12-16 PROCEDURE — 85025 COMPLETE CBC W/AUTO DIFF WBC: CPT

## 2022-12-16 PROCEDURE — 2709999900 HC NON-CHARGEABLE SUPPLY: Performed by: ORTHOPAEDIC SURGERY

## 2022-12-16 PROCEDURE — 7100000000 HC PACU RECOVERY - FIRST 15 MIN: Performed by: ORTHOPAEDIC SURGERY

## 2022-12-16 PROCEDURE — 7100000001 HC PACU RECOVERY - ADDTL 15 MIN: Performed by: ORTHOPAEDIC SURGERY

## 2022-12-16 PROCEDURE — 3700000000 HC ANESTHESIA ATTENDED CARE: Performed by: ORTHOPAEDIC SURGERY

## 2022-12-16 PROCEDURE — 3209999900 FLUORO FOR SURGICAL PROCEDURES

## 2022-12-16 PROCEDURE — 80048 BASIC METABOLIC PNL TOTAL CA: CPT

## 2022-12-16 PROCEDURE — 1200000000 HC SEMI PRIVATE

## 2022-12-16 DEVICE — TFNA FENESTRATED SCREW 105MM - STERILE
Type: IMPLANTABLE DEVICE | Site: HIP | Status: FUNCTIONAL
Brand: TFN-ADVANCE

## 2022-12-16 DEVICE — SCREW BNE L40MM DIA5MM ST TIB LT GRN TI ST CANN LOK FULL: Type: IMPLANTABLE DEVICE | Site: HIP | Status: FUNCTIONAL

## 2022-12-16 DEVICE — IMPLANTABLE DEVICE: Type: IMPLANTABLE DEVICE | Site: HIP | Status: FUNCTIONAL

## 2022-12-16 RX ORDER — CEFAZOLIN 2 G/1
INJECTION, POWDER, FOR SOLUTION INTRAMUSCULAR; INTRAVENOUS
Status: DISPENSED
Start: 2022-12-16 | End: 2022-12-17

## 2022-12-16 RX ORDER — SODIUM CHLORIDE 0.9 % (FLUSH) 0.9 %
5-40 SYRINGE (ML) INJECTION EVERY 12 HOURS SCHEDULED
Status: DISCONTINUED | OUTPATIENT
Start: 2022-12-16 | End: 2022-12-21 | Stop reason: HOSPADM

## 2022-12-16 RX ORDER — FENTANYL CITRATE 0.05 MG/ML
50 INJECTION, SOLUTION INTRAMUSCULAR; INTRAVENOUS ONCE
Status: COMPLETED | OUTPATIENT
Start: 2022-12-16 | End: 2022-12-16

## 2022-12-16 RX ORDER — LIDOCAINE HYDROCHLORIDE 20 MG/ML
INJECTION, SOLUTION INTRAVENOUS PRN
Status: DISCONTINUED | OUTPATIENT
Start: 2022-12-16 | End: 2022-12-16 | Stop reason: SDUPTHER

## 2022-12-16 RX ORDER — GLYCOPYRROLATE 0.2 MG/ML
INJECTION INTRAMUSCULAR; INTRAVENOUS PRN
Status: DISCONTINUED | OUTPATIENT
Start: 2022-12-16 | End: 2022-12-16 | Stop reason: SDUPTHER

## 2022-12-16 RX ORDER — SODIUM CHLORIDE 0.9 % (FLUSH) 0.9 %
5-40 SYRINGE (ML) INJECTION EVERY 12 HOURS SCHEDULED
Status: DISCONTINUED | OUTPATIENT
Start: 2022-12-16 | End: 2022-12-16 | Stop reason: HOSPADM

## 2022-12-16 RX ORDER — DEXTROSE MONOHYDRATE 100 MG/ML
INJECTION, SOLUTION INTRAVENOUS CONTINUOUS PRN
Status: DISCONTINUED | OUTPATIENT
Start: 2022-12-16 | End: 2022-12-21 | Stop reason: HOSPADM

## 2022-12-16 RX ORDER — SODIUM CHLORIDE 9 MG/ML
INJECTION, SOLUTION INTRAVENOUS PRN
Status: DISCONTINUED | OUTPATIENT
Start: 2022-12-16 | End: 2022-12-21 | Stop reason: HOSPADM

## 2022-12-16 RX ORDER — PANTOPRAZOLE SODIUM 40 MG/1
40 TABLET, DELAYED RELEASE ORAL
Status: DISCONTINUED | OUTPATIENT
Start: 2022-12-17 | End: 2022-12-21 | Stop reason: HOSPADM

## 2022-12-16 RX ORDER — FENTANYL CITRATE 0.05 MG/ML
75 INJECTION, SOLUTION INTRAMUSCULAR; INTRAVENOUS ONCE
Status: DISCONTINUED | OUTPATIENT
Start: 2022-12-16 | End: 2022-12-16

## 2022-12-16 RX ORDER — SODIUM CHLORIDE 9 MG/ML
INJECTION INTRAVENOUS
Status: DISPENSED
Start: 2022-12-16 | End: 2022-12-17

## 2022-12-16 RX ORDER — SODIUM CHLORIDE 9 MG/ML
INJECTION, SOLUTION INTRAVENOUS
Status: COMPLETED
Start: 2022-12-16 | End: 2022-12-16

## 2022-12-16 RX ORDER — OXYCODONE HYDROCHLORIDE AND ACETAMINOPHEN 5; 325 MG/1; MG/1
1 TABLET ORAL EVERY 6 HOURS PRN
Qty: 28 TABLET | Refills: 0 | Status: SHIPPED | OUTPATIENT
Start: 2022-12-16 | End: 2022-12-23

## 2022-12-16 RX ORDER — SODIUM CHLORIDE 9 MG/ML
INJECTION, SOLUTION INTRAVENOUS CONTINUOUS PRN
Status: DISCONTINUED | OUTPATIENT
Start: 2022-12-16 | End: 2022-12-16 | Stop reason: SDUPTHER

## 2022-12-16 RX ORDER — SUCRALFATE 1 G/1
1 TABLET ORAL
Status: DISCONTINUED | OUTPATIENT
Start: 2022-12-17 | End: 2022-12-21 | Stop reason: HOSPADM

## 2022-12-16 RX ORDER — MEPERIDINE HYDROCHLORIDE 25 MG/ML
12.5 INJECTION INTRAMUSCULAR; INTRAVENOUS; SUBCUTANEOUS EVERY 5 MIN PRN
Status: DISCONTINUED | OUTPATIENT
Start: 2022-12-16 | End: 2022-12-16 | Stop reason: HOSPADM

## 2022-12-16 RX ORDER — ONDANSETRON 4 MG/1
4 TABLET, ORALLY DISINTEGRATING ORAL EVERY 8 HOURS PRN
Status: DISCONTINUED | OUTPATIENT
Start: 2022-12-16 | End: 2022-12-21 | Stop reason: HOSPADM

## 2022-12-16 RX ORDER — DOXAZOSIN MESYLATE 1 MG/1
1 TABLET ORAL DAILY
Status: DISCONTINUED | OUTPATIENT
Start: 2022-12-17 | End: 2022-12-21 | Stop reason: HOSPADM

## 2022-12-16 RX ORDER — ONDANSETRON 2 MG/ML
4 INJECTION INTRAMUSCULAR; INTRAVENOUS EVERY 6 HOURS PRN
Status: DISCONTINUED | OUTPATIENT
Start: 2022-12-16 | End: 2022-12-21 | Stop reason: HOSPADM

## 2022-12-16 RX ORDER — PROPOFOL 10 MG/ML
INJECTION, EMULSION INTRAVENOUS PRN
Status: DISCONTINUED | OUTPATIENT
Start: 2022-12-16 | End: 2022-12-16 | Stop reason: SDUPTHER

## 2022-12-16 RX ORDER — 0.9 % SODIUM CHLORIDE 0.9 %
500 INTRAVENOUS SOLUTION INTRAVENOUS ONCE
Status: COMPLETED | OUTPATIENT
Start: 2022-12-16 | End: 2022-12-16

## 2022-12-16 RX ORDER — DIPHENHYDRAMINE HCL 25 MG
25 TABLET ORAL EVERY 8 HOURS PRN
Status: DISCONTINUED | OUTPATIENT
Start: 2022-12-16 | End: 2022-12-21 | Stop reason: HOSPADM

## 2022-12-16 RX ORDER — ASPIRIN 81 MG/1
81 TABLET, CHEWABLE ORAL DAILY
Status: DISCONTINUED | OUTPATIENT
Start: 2022-12-17 | End: 2022-12-17

## 2022-12-16 RX ORDER — ONDANSETRON 2 MG/ML
INJECTION INTRAMUSCULAR; INTRAVENOUS PRN
Status: DISCONTINUED | OUTPATIENT
Start: 2022-12-16 | End: 2022-12-16 | Stop reason: SDUPTHER

## 2022-12-16 RX ORDER — PROCHLORPERAZINE EDISYLATE 5 MG/ML
5 INJECTION INTRAMUSCULAR; INTRAVENOUS
Status: DISCONTINUED | OUTPATIENT
Start: 2022-12-16 | End: 2022-12-16 | Stop reason: HOSPADM

## 2022-12-16 RX ORDER — ROCURONIUM BROMIDE 10 MG/ML
INJECTION, SOLUTION INTRAVENOUS PRN
Status: DISCONTINUED | OUTPATIENT
Start: 2022-12-16 | End: 2022-12-16 | Stop reason: SDUPTHER

## 2022-12-16 RX ORDER — ACETAMINOPHEN 650 MG/1
650 SUPPOSITORY RECTAL EVERY 6 HOURS PRN
Status: DISCONTINUED | OUTPATIENT
Start: 2022-12-16 | End: 2022-12-17

## 2022-12-16 RX ORDER — HYDROCODONE BITARTRATE AND ACETAMINOPHEN 7.5; 325 MG/1; MG/1
1 TABLET ORAL EVERY 6 HOURS PRN
Status: DISCONTINUED | OUTPATIENT
Start: 2022-12-16 | End: 2022-12-17

## 2022-12-16 RX ORDER — FENTANYL CITRATE 50 UG/ML
INJECTION, SOLUTION INTRAMUSCULAR; INTRAVENOUS PRN
Status: DISCONTINUED | OUTPATIENT
Start: 2022-12-16 | End: 2022-12-16 | Stop reason: SDUPTHER

## 2022-12-16 RX ORDER — INSULIN GLARGINE 100 [IU]/ML
28 INJECTION, SOLUTION SUBCUTANEOUS 2 TIMES DAILY
Status: DISCONTINUED | OUTPATIENT
Start: 2022-12-16 | End: 2022-12-18

## 2022-12-16 RX ORDER — 0.9 % SODIUM CHLORIDE 0.9 %
1000 INTRAVENOUS SOLUTION INTRAVENOUS ONCE
Status: COMPLETED | OUTPATIENT
Start: 2022-12-16 | End: 2022-12-16

## 2022-12-16 RX ORDER — ANASTROZOLE 1 MG/1
1 TABLET ORAL DAILY
Status: DISCONTINUED | OUTPATIENT
Start: 2022-12-17 | End: 2022-12-21 | Stop reason: HOSPADM

## 2022-12-16 RX ORDER — SODIUM CHLORIDE 9 MG/ML
INJECTION, SOLUTION INTRAVENOUS PRN
Status: DISCONTINUED | OUTPATIENT
Start: 2022-12-16 | End: 2022-12-16 | Stop reason: HOSPADM

## 2022-12-16 RX ORDER — INSULIN LISPRO 100 [IU]/ML
0-4 INJECTION, SOLUTION INTRAVENOUS; SUBCUTANEOUS
Status: DISCONTINUED | OUTPATIENT
Start: 2022-12-16 | End: 2022-12-17

## 2022-12-16 RX ORDER — ACETAMINOPHEN 325 MG/1
650 TABLET ORAL EVERY 6 HOURS PRN
Status: DISCONTINUED | OUTPATIENT
Start: 2022-12-16 | End: 2022-12-17

## 2022-12-16 RX ORDER — MIDAZOLAM HYDROCHLORIDE 1 MG/ML
INJECTION INTRAMUSCULAR; INTRAVENOUS PRN
Status: DISCONTINUED | OUTPATIENT
Start: 2022-12-16 | End: 2022-12-16 | Stop reason: SDUPTHER

## 2022-12-16 RX ORDER — NEOSTIGMINE METHYLSULFATE 1 MG/ML
INJECTION, SOLUTION INTRAVENOUS PRN
Status: DISCONTINUED | OUTPATIENT
Start: 2022-12-16 | End: 2022-12-16 | Stop reason: SDUPTHER

## 2022-12-16 RX ORDER — ATORVASTATIN CALCIUM 40 MG/1
40 TABLET, FILM COATED ORAL NIGHTLY
Status: DISCONTINUED | OUTPATIENT
Start: 2022-12-16 | End: 2022-12-21 | Stop reason: HOSPADM

## 2022-12-16 RX ORDER — DEXAMETHASONE SODIUM PHOSPHATE 10 MG/ML
INJECTION, SOLUTION INTRAMUSCULAR; INTRAVENOUS PRN
Status: DISCONTINUED | OUTPATIENT
Start: 2022-12-16 | End: 2022-12-16 | Stop reason: SDUPTHER

## 2022-12-16 RX ORDER — ASPIRIN 81 MG/1
81 TABLET ORAL 2 TIMES DAILY
Qty: 56 TABLET | Refills: 0 | Status: SHIPPED | OUTPATIENT
Start: 2022-12-16 | End: 2023-01-13

## 2022-12-16 RX ORDER — INSULIN LISPRO 100 [IU]/ML
0-4 INJECTION, SOLUTION INTRAVENOUS; SUBCUTANEOUS NIGHTLY
Status: DISCONTINUED | OUTPATIENT
Start: 2022-12-16 | End: 2022-12-17

## 2022-12-16 RX ORDER — SODIUM CHLORIDE 0.9 % (FLUSH) 0.9 %
5-40 SYRINGE (ML) INJECTION PRN
Status: DISCONTINUED | OUTPATIENT
Start: 2022-12-16 | End: 2022-12-16 | Stop reason: HOSPADM

## 2022-12-16 RX ORDER — POLYETHYLENE GLYCOL 3350 17 G/17G
17 POWDER, FOR SOLUTION ORAL DAILY PRN
Status: DISCONTINUED | OUTPATIENT
Start: 2022-12-16 | End: 2022-12-21 | Stop reason: HOSPADM

## 2022-12-16 RX ORDER — SODIUM CHLORIDE 0.9 % (FLUSH) 0.9 %
5-40 SYRINGE (ML) INJECTION PRN
Status: DISCONTINUED | OUTPATIENT
Start: 2022-12-16 | End: 2022-12-21 | Stop reason: HOSPADM

## 2022-12-16 RX ADMIN — HYDROMORPHONE HYDROCHLORIDE 0.5 MG: 1 INJECTION, SOLUTION INTRAMUSCULAR; INTRAVENOUS; SUBCUTANEOUS at 19:16

## 2022-12-16 RX ADMIN — Medication 500 ML: at 16:36

## 2022-12-16 RX ADMIN — Medication 3 MG: at 18:33

## 2022-12-16 RX ADMIN — PHENYLEPHRINE HYDROCHLORIDE 300 MCG: 10 INJECTION INTRAVENOUS at 18:17

## 2022-12-16 RX ADMIN — HYDROMORPHONE HYDROCHLORIDE 0.5 MG: 1 INJECTION, SOLUTION INTRAMUSCULAR; INTRAVENOUS; SUBCUTANEOUS at 19:22

## 2022-12-16 RX ADMIN — CEFAZOLIN 2000 MG: 2 INJECTION, POWDER, FOR SOLUTION INTRAMUSCULAR; INTRAVENOUS at 17:33

## 2022-12-16 RX ADMIN — INSULIN HUMAN 10 UNITS: 100 INJECTION, SOLUTION PARENTERAL at 14:24

## 2022-12-16 RX ADMIN — ONDANSETRON 4 MG: 2 INJECTION INTRAMUSCULAR; INTRAVENOUS at 20:35

## 2022-12-16 RX ADMIN — LIDOCAINE HYDROCHLORIDE 80 MG: 20 INJECTION, SOLUTION INTRAVENOUS at 17:27

## 2022-12-16 RX ADMIN — FENTANYL CITRATE 100 MCG: 50 INJECTION, SOLUTION INTRAMUSCULAR; INTRAVENOUS at 17:27

## 2022-12-16 RX ADMIN — ATORVASTATIN CALCIUM 40 MG: 40 TABLET, FILM COATED ORAL at 20:35

## 2022-12-16 RX ADMIN — HYDROMORPHONE HYDROCHLORIDE 0.5 MG: 1 INJECTION, SOLUTION INTRAMUSCULAR; INTRAVENOUS; SUBCUTANEOUS at 19:37

## 2022-12-16 RX ADMIN — FENTANYL CITRATE 100 MCG: 50 INJECTION, SOLUTION INTRAMUSCULAR; INTRAVENOUS at 17:48

## 2022-12-16 RX ADMIN — ONDANSETRON 4 MG: 2 INJECTION INTRAMUSCULAR; INTRAVENOUS at 18:26

## 2022-12-16 RX ADMIN — INSULIN GLARGINE 28 UNITS: 100 INJECTION, SOLUTION SUBCUTANEOUS at 20:43

## 2022-12-16 RX ADMIN — FENTANYL CITRATE 50 MCG: 50 INJECTION INTRAMUSCULAR; INTRAVENOUS at 16:37

## 2022-12-16 RX ADMIN — FENTANYL CITRATE 50 MCG: 50 INJECTION INTRAMUSCULAR; INTRAVENOUS at 14:27

## 2022-12-16 RX ADMIN — PHENYLEPHRINE HYDROCHLORIDE 300 MCG: 10 INJECTION INTRAVENOUS at 18:26

## 2022-12-16 RX ADMIN — ACETAMINOPHEN 650 MG: 325 TABLET ORAL at 20:53

## 2022-12-16 RX ADMIN — HYDROMORPHONE HYDROCHLORIDE 0.5 MG: 1 INJECTION, SOLUTION INTRAMUSCULAR; INTRAVENOUS; SUBCUTANEOUS at 19:56

## 2022-12-16 RX ADMIN — PHENYLEPHRINE HYDROCHLORIDE 300 MCG: 10 INJECTION INTRAVENOUS at 18:21

## 2022-12-16 RX ADMIN — DIPHENHYDRAMINE HYDROCHLORIDE 25 MG: 25 TABLET ORAL at 22:32

## 2022-12-16 RX ADMIN — FENTANYL CITRATE 50 MCG: 50 INJECTION INTRAMUSCULAR; INTRAVENOUS at 12:32

## 2022-12-16 RX ADMIN — MIDAZOLAM 2 MG: 1 INJECTION INTRAMUSCULAR; INTRAVENOUS at 17:22

## 2022-12-16 RX ADMIN — HYDROMORPHONE HYDROCHLORIDE 0.5 MG: 1 INJECTION, SOLUTION INTRAMUSCULAR; INTRAVENOUS; SUBCUTANEOUS at 19:06

## 2022-12-16 RX ADMIN — SODIUM CHLORIDE: 9 INJECTION, SOLUTION INTRAVENOUS at 18:18

## 2022-12-16 RX ADMIN — PHENYLEPHRINE HYDROCHLORIDE 100 MCG: 10 INJECTION INTRAVENOUS at 18:46

## 2022-12-16 RX ADMIN — PROPOFOL 170 MG: 10 INJECTION, EMULSION INTRAVENOUS at 17:27

## 2022-12-16 RX ADMIN — HYDROCODONE BITARTRATE AND ACETAMINOPHEN 1 TABLET: 7.5; 325 TABLET ORAL at 22:31

## 2022-12-16 RX ADMIN — SODIUM CHLORIDE 1000 ML: 9 INJECTION, SOLUTION INTRAVENOUS at 14:24

## 2022-12-16 RX ADMIN — INSULIN LISPRO 4 UNITS: 100 INJECTION, SOLUTION INTRAVENOUS; SUBCUTANEOUS at 20:42

## 2022-12-16 RX ADMIN — GLYCOPYRROLATE 0.2 MG: 0.2 INJECTION, SOLUTION INTRAMUSCULAR; INTRAVENOUS at 18:33

## 2022-12-16 RX ADMIN — DEXAMETHASONE SODIUM PHOSPHATE 10 MG: 10 INJECTION INTRAMUSCULAR; INTRAVENOUS at 17:36

## 2022-12-16 RX ADMIN — SODIUM CHLORIDE: 9 INJECTION, SOLUTION INTRAVENOUS at 17:16

## 2022-12-16 RX ADMIN — FENTANYL CITRATE 50 MCG: 50 INJECTION, SOLUTION INTRAMUSCULAR; INTRAVENOUS at 18:46

## 2022-12-16 RX ADMIN — PHENYLEPHRINE HYDROCHLORIDE 100 MCG: 10 INJECTION INTRAVENOUS at 18:36

## 2022-12-16 RX ADMIN — SODIUM CHLORIDE 500 ML: 9 INJECTION, SOLUTION INTRAVENOUS at 16:36

## 2022-12-16 RX ADMIN — ROCURONIUM BROMIDE 40 MG: 10 INJECTION, SOLUTION INTRAVENOUS at 17:27

## 2022-12-16 RX ADMIN — SODIUM CHLORIDE 1000 MG: 9 INJECTION, SOLUTION INTRAVENOUS at 17:41

## 2022-12-16 RX ADMIN — PHENYLEPHRINE HYDROCHLORIDE 200 MCG: 10 INJECTION INTRAVENOUS at 18:07

## 2022-12-16 ASSESSMENT — PAIN DESCRIPTION - FREQUENCY
FREQUENCY: CONTINUOUS
FREQUENCY: CONTINUOUS

## 2022-12-16 ASSESSMENT — PAIN DESCRIPTION - ONSET
ONSET: ON-GOING
ONSET: AWAKENED FROM SLEEP

## 2022-12-16 ASSESSMENT — PAIN SCALES - GENERAL
PAINLEVEL_OUTOF10: 7
PAINLEVEL_OUTOF10: 9
PAINLEVEL_OUTOF10: 9
PAINLEVEL_OUTOF10: 7
PAINLEVEL_OUTOF10: 8
PAINLEVEL_OUTOF10: 10
PAINLEVEL_OUTOF10: 6
PAINLEVEL_OUTOF10: 10

## 2022-12-16 ASSESSMENT — ENCOUNTER SYMPTOMS
COUGH: 0
ABDOMINAL DISTENTION: 0
BACK PAIN: 0
SORE THROAT: 0
ABDOMINAL PAIN: 0
DIARRHEA: 0
CHEST TIGHTNESS: 0
VOMITING: 0
NAUSEA: 0
SHORTNESS OF BREATH: 0

## 2022-12-16 ASSESSMENT — PAIN DESCRIPTION - ORIENTATION
ORIENTATION: LEFT

## 2022-12-16 ASSESSMENT — PAIN DESCRIPTION - LOCATION
LOCATION: HIP
LOCATION: HIP
LOCATION: LEG
LOCATION: HIP
LOCATION: LEG
LOCATION: LEG

## 2022-12-16 ASSESSMENT — PAIN - FUNCTIONAL ASSESSMENT: PAIN_FUNCTIONAL_ASSESSMENT: PREVENTS OR INTERFERES WITH ALL ACTIVE AND SOME PASSIVE ACTIVITIES

## 2022-12-16 ASSESSMENT — PAIN DESCRIPTION - DESCRIPTORS
DESCRIPTORS: SQUEEZING
DESCRIPTORS: ACHING;SHARP
DESCRIPTORS: PRESSURE;SQUEEZING
DESCRIPTORS: ACHING;SHARP
DESCRIPTORS: DISCOMFORT;ACHING
DESCRIPTORS: SQUEEZING

## 2022-12-16 ASSESSMENT — PAIN DESCRIPTION - PAIN TYPE: TYPE: SURGICAL PAIN

## 2022-12-16 ASSESSMENT — LIFESTYLE VARIABLES: SMOKING_STATUS: 1

## 2022-12-16 NOTE — ED NOTES
Attempted to call report to PACU, pt to surgery with transport.       Kaylee Roca RN  12/16/22 [address] [place] Cierra Baltazar RN  12/16/22 7949

## 2022-12-16 NOTE — ED PROVIDER NOTES
HPI     Patient is a 41 y.o. female presents with a chief complaint of fall and hip pain  This has been occurring for a few hours. Patient states that it gets better with nothing. Patient states that it gets worse with nothing. Patient states that it is severe   in severity. Patient states it was acute in onset. Patient presents with mechanical fall. Patient reportedly was walking and tripped over a rug. Landed on her left hip. Then developed some extreme pain. Patient denies hitting her head but is unsure. No blood thinners. Patient states that she has a history of a rotator cuff issue and has some pain in her left arm. Patient states that she has pain with any movement. Denies any chest pain or shortness of breath prior to the fall. Denies any fevers, chills, nausea, vomiting, abdominal pain, changes in urinary or bowel habits. Review of Systems   Constitutional:  Negative for activity change, appetite change, chills, fatigue and fever. HENT:  Negative for congestion, drooling and sore throat. Respiratory:  Negative for cough, chest tightness and shortness of breath. Cardiovascular:  Negative for chest pain and palpitations. Gastrointestinal:  Negative for abdominal distention, abdominal pain, diarrhea, nausea and vomiting. Genitourinary:  Negative for decreased urine volume, difficulty urinating, enuresis, flank pain, frequency and hematuria. Musculoskeletal:  Positive for joint swelling. Negative for arthralgias, back pain and neck stiffness. Skin:  Negative for rash and wound. Neurological:  Negative for dizziness, facial asymmetry, light-headedness and headaches. Psychiatric/Behavioral:  Negative for agitation, confusion and decreased concentration. Physical Exam     Procedures     MDM       ED Course as of 12/16/22 1458   Fri Dec 16, 2022   1350 Discussed case internal medicine agreed to admit patient [JM]   [phone] Discussed case with orthopedic surgery.   Patient stated that she ate last night. Orthopedic surgery may take patient to operating room today [JM]   1434 EKG read interpreted by me. Rate 90 bpm.  Normal QRS. Normal ME interval.  Normal QRS. No ST elevations depressions. Normal EKG [JM]   1435 EKG: This EKG is signed and interpreted by me. Rate: 99  Rhythm: Sinus  Interpretation: Normal sinus rhythm with PVCs and fusion complexes, normal axis, no acute ST elevations or depressions, intervals within normal limits, QTC is 451  Comparison: no previous EKG available    [KG]      ED Course User Index  [JM] Yong Purdy MD  [KG] Deshaun Donaldson DO      Patient is a 41 y.o. female presenting with hip fracture. Patient had a mechanical fall. Patient does have a history of breast cancer. No current breast cancer as far as patient is aware. Patient was given multiple doses of pain medicine. Mild improvement of symptoms. Patient's x-ray was concerning for an intertrochanteric hip fracture. Called and discussed case with orthopedic surgery. Patient has not eaten at all today. Patient was made n.p.o. patient will be admitted to internal medicine. Discussed case internal medicine agreed to admit patient. Patient was mildly hyperglycemic. Given insulin and fluids. Patient is not in DKA. Normal anion gap. Bicarb is 21. Patient was seen and evaluated by myself and my attending Deshaun Donaldson DO. Assessment and Plan discussed with attending provider, please see attestation for final plan of care. This note was done using dictation software and there may be some grammatical errors associated with this. Yong Purdy MD      ED Course as of 12/16/22 1638   Fri Dec 16, 2022   1350 Discussed case internal medicine agreed to admit patient [JM]   [phone] Discussed case with orthopedic surgery. Patient stated that she ate last night. Orthopedic surgery may take patient to operating room today [JM]   1434 EKG read interpreted by me.   Rate 90 bpm.  Normal QRS. Normal NH interval.  Normal QRS. No ST elevations depressions. Normal EKG []   1435 EKG: This EKG is signed and interpreted by me. Rate: 99  Rhythm: Sinus  Interpretation: Normal sinus rhythm with PVCs and fusion complexes, normal axis, no acute ST elevations or depressions, intervals within normal limits, QTC is 451  Comparison: no previous EKG available    [KG]      ED Course User Index  [JM] Ra Moya MD  [KG] Kenyon Cuadra, DO       --------------------------------------------- PAST HISTORY ---------------------------------------------  Past Medical History:  has a past medical history of BRCA1 gene mutation positive, Breast cancer ([facility] [address] Cancer ([facility] [address] Diabetes mellitus ([facility] [address] Headache(784.0), Hyperlipidemia, Hypertension, Kidney stone, Lumbar herniated disc, Migraine, and Pancreatitis. Past Surgical History:  has a past surgical history that includes Cholecystectomy; Tubal ligation; LEEP; Tonsillectomy; Tibia fracture surgery; Ankle surgery; Breast biopsy; Breast lumpectomy; and Hysterectomy. Social History:  reports that she has been smoking cigarettes. She has a 0.50 pack-year smoking history. She has never used smokeless tobacco. She reports that she does not currently use drugs after having used the following drugs: Marijuana [place]). She reports that she does not drink alcohol. Family History: family history includes Alcohol Abuse in her father; Asthma in her daughter and son; Brain Cancer in her daughter; Cancer in her father, maternal grandfather, maternal grandmother, paternal grandfather, and paternal grandmother; Diabetes in her maternal grandmother and mother; Heart Attack in her father; Hypotension in her father; Kidney Cancer in her maternal grandfather; [place] in her father. The patients home medications have been reviewed. Allergies: Patient has no known allergies.     -------------------------------------------------- RESULTS -------------------------------------------------    LABS:  Results for orders placed or performed during the hospital encounter of 12/16/22   BMP   Result Value Ref Range    Sodium 135 132 - 146 mmol/L    Potassium 4.7 3.5 - 5.0 mmol/L    Chloride 101 98 - 107 mmol/L    CO2 21 (L) 22 - 29 mmol/L    Anion Gap 13 7 - 16 mmol/L    Glucose 471 (H) 74 - 99 mg/dL    BUN 10 6 - 20 mg/dL    Creatinine 0.5 0.5 - 1.0 mg/dL    Est, Glom Filt Rate >60 >=60 mL/min/1.73    Calcium 9.2 8.6 - 10.2 mg/dL   CBC with Auto Differential   Result Value Ref Range    WBC 13.4 (H) 4.5 - 11.5 E9/L    RBC 5.12 3.50 - 5.50 E12/L    Hemoglobin 15.5 11.5 - 15.5 g/dL    Hematocrit 44.9 34.0 - 48.0 %    MCV 87.7 80.0 - 99.9 fL    MCH 30.3 26.0 - 35.0 pg    MCHC 34.5 32.0 - 34.5 %    RDW 13.6 11.5 - 15.0 fL    Platelets 499 224 - 335 E9/L    MPV 11.5 7.0 - 12.0 fL    Neutrophils % 80.2 (H) 43.0 - 80.0 %    Immature Granulocytes % 1.0 0.0 - 5.0 %    Lymphocytes % 14.0 (L) 20.0 - 42.0 %    Monocytes % 4.0 2.0 - 12.0 %    Eosinophils % 0.4 0.0 - 6.0 %    Basophils % 0.4 0.0 - 2.0 %    Neutrophils Absolute 10.71 (H) 1.80 - 7.30 E9/L    Immature Granulocytes # 0.14 E9/L    Lymphocytes Absolute 1.87 1.50 - 4.00 E9/L    Monocytes Absolute 0.53 0.10 - 0.95 E9/L    Eosinophils Absolute 0.06 0.05 - 0.50 E9/L    Basophils Absolute 0.06 0.00 - 0.20 E9/L   APTT   Result Value Ref Range    aPTT 35.1 24.5 - 35.1 sec   EKG 12 Lead   Result Value Ref Range    Ventricular Rate 99 BPM    Atrial Rate 99 BPM    P-R Interval 140 ms    QRS Duration 86 ms    Q-T Interval 352 ms    QTc Calculation (Bazett) 451 ms    P Axis 50 degrees    R Axis 53 degrees    T Axis 31 degrees       RADIOLOGY:  XR HIP LEFT (2-3 VIEWS)   Final Result   Mildly displaced and comminuted intertrochanteric fracture at the left femur. XR FEMUR LEFT (MIN 2 VIEWS)   Final Result   Mildly displaced and comminuted intertrochanteric fracture at the left femur.          CT HEAD WO CONTRAST Final Result   No acute intracranial abnormality. No acute osseous abnormality of the cervical spine. Degenerative changes of the cervical spine. CT CSpine W/O Contrast   Final Result   No acute intracranial abnormality. No acute osseous abnormality of the cervical spine. Degenerative changes of the cervical spine. Fluoro For Surgical Procedures    (Results Pending)           ------------------------- NURSING NOTES AND VITALS REVIEWED ---------------------------  Date / Time Roomed:  12/16/2022 11:50 AM  ED Bed Assignment:  16/16    The nursing notes within the ED encounter and vital signs as below have been reviewed. Patient Vitals for the past 24 hrs:   BP Temp Temp src Pulse Resp SpO2   12/16/22 1155 (!) 120/94 98 °F (36.7 °C) Oral 88 18 100 %       Oxygen Saturation Interpretation: Normal    ------------------------------------------ PROGRESS NOTES ------------------------------------------  Re-evaluation(s):   Patients symptoms show no change  Repeat physical examination is not changed    Counseling:  I have spoken with the patient and discussed todays results, in addition to providing specific details for the plan of care and counseling regarding the diagnosis and prognosis. Their questions are answered at this time and they are agreeable with the plan of admission.    --------------------------------- ADDITIONAL PROVIDER NOTES ---------------------------------  Consultations:  Spoke with Dr. Carrie Goetz. Discussed case. They will admit the patient. This patient's ED course included: a personal history and physicial examination, re-evaluation prior to disposition, multiple bedside re-evaluations, and continuous pulse oximetry    This patient has remained hemodynamically stable during their ED course. Diagnosis:  1. Hyperglycemia    2.  Closed intertrochanteric fracture of hip, left, initial encounter ([facility] [address]    3. Closed displaced intertrochanteric fracture of left femur, initial encounter ([facility] [address]        Disposition:  Patient's disposition: Admit to med/surg floor  Patient's condition is Stable         Violeta Villalobos MD  Resident  12/16/22 9506

## 2022-12-16 NOTE — PROGRESS NOTES
Physical Therapy    Room #:   Date: 2022       Patient Name: Edna Kraft  : 1975      MRN: [id number]     Patient unavailable for physical therapy eval due to scheduled for procedure @ [address] [place] [address] left hip ORIF . Will attempt PT evaluation at a later time. Thank you.        Amanda Diaz, PT

## 2022-12-16 NOTE — H&P
[address] [place], [place] [address]ist Group   History and Physical      CHIEF COMPLAINT: Pain in left hip after fall    History of Present Illness:  41 y.o. female with a history of hypertension, type II DM on insulin, stage II ER positive, DC negative H ER-2/alessandra negative right breast cancer completed 5 and half years of tamoxifen therapy but and recently switched to Arimidex for lesser side effects, status post HONG/BSO who had a mechanical fall at home. She states she has a rug covering TV cables at home between the room; she is not sure if he tripped over cables or just the unevenness of the rug but fell into the wall and then with all her weight landed onto left hip on the concrete floor. She was unable to get up. EMS was called   In the ER, CT of the head and neck were obtained without any acute abnormality. However, x-rays of the left femur and hip revealed mildly displaced and comminuted intertrochanteric fracture of the left femur. Orthopedics was consulted and they plan to take the patient for surgical intervention i.e. ORIF. Patient was seen in the ER for medical clearance for surgery. Informant(s) for H&P:patient and EMR    REVIEW OF SYSTEMS:   As per HPI, otherwise negative.     PMH:  Past Medical History:   Diagnosis Date    BRCA1 gene mutation positive     Patient put herself and it was postive per patient    Breast cancer ([address]     Cancer ([address]     Breast    Diabetes mellitus ([address]     Headache(784.0)     migraines    Hyperlipidemia     Hypertension     Kidney stone     Lumbar herniated disc 2012    x3    Migraine     Pancreatitis 07/2022    tx at [facility]       Surgical History:  Past Surgical History:   Procedure Laterality Date    ANKLE SURGERY      BREAST BIOPSY      BREAST LUMPECTOMY      CHOLECYSTECTOMY      HYSTERECTOMY (CERVIX STATUS UNKNOWN)      LEEP      4 PROCEDURES    TIBIA FRACTURE SURGERY      TONSILLECTOMY      TUBAL LIGATION         Medications Prior to Admission:    Prior to Admission medications    Medication Sig Start Date End Date Taking? Authorizing Provider   doxazosin (CARDURA) 1 MG tablet  8/30/22   Historical Provider, MD   anastrozole (ARIMIDEX) 1 MG tablet  9/6/22   Historical Provider, MD   sucralfate (CARAFATE) 1 GM/10ML suspension Take 10 mLs by mouth 4 times daily 6/30/22   Kathlyn Dancer, MD   ondansetron (ZOFRAN) 4 MG tablet Take 4 mg by mouth every 8 hours as needed for Nausea or Vomiting    Historical Provider, MD   Dulaglutide 1.5 MG/0.5ML SOPN Inject 1.5 mg into the skin once a week 6/24/22   Kathlyn Dancer, MD   Insulin Degludec (TRESIBA FLEXTOUCH) 100 UNIT/ML SOPN Inject 36 Units into the skin in the morning and at bedtime 6/24/22   Kathlyn Dancer, MD   Insulin Pen Needle 32G X 6 MM MISC 1 Device by Does not apply route daily 6/24/22   Kathlyn Dancer, MD   atorvastatin (LIPITOR) 40 MG tablet take 1 tablet by mouth once daily 6/24/22   Kathlyn Dancer, MD   omeprazole (PRILOSEC) 20 MG delayed release capsule omeprazole 20 mg capsule,delayed release  take 1 capsule by mouth twice a day as directed 6/24/22   Kathlyn Dancer, MD   metFORMIN (GLUCOPHAGE) 1000 MG tablet Take 1 tablet by mouth 2 times daily (with meals) 6/24/22 9/22/22  Kathlyn Dancer, MD   lisinopril (PRINIVIL;ZESTRIL) 20 MG tablet Take 1 tablet by mouth daily 6/24/22   Kathlyn Dancer, MD   tamoxifen (NOLVADEX) 20 MG tablet Take 20 mg by mouth nightly    Historical Provider, MD   B-DIANA INS SYRINGE 0.5CC/31GX5/16 31G X 5/16\" 0.5 ML MISC  5/3/16   Historical Provider, MD   ONE TOUCH LANCETS MISC Check blood sugar bid 2/3/16   Vipul Bond MD       Allergies:    Patient has no known allergies. Social History:    reports that she has been smoking cigarettes. She has a 0.50 pack-year smoking history. She has never used smokeless tobacco. She reports that she does not currently use drugs after having used the following drugs: Marijuana [place]).  She reports that she does not drink alcohol. Family History:   family history includes Alcohol Abuse in her father; Asthma in her daughter and son; Brain Cancer in her daughter; Cancer in her father, maternal grandfather, maternal grandmother, paternal grandfather, and paternal grandmother; Diabetes in her maternal grandmother and mother; Heart Attack in her father; Hypotension in her father; Kidney Cancer in her maternal grandfather; [place] in her father. PHYSICAL EXAM:  Vitals:  BP (!) 120/94   Pulse 88   Temp 98 °F (36.7 °C) (Oral)   Resp 18   LMP 08/16/2011   SpO2 100%   General Appearance: alert and oriented to person, place and time but in moderate distress secondary to pain  Skin: warm and dry  Head: normocephalic and atraumatic  Eyes: pupils equal, round, and reactive to light, extraocular eye movements intact, conjunctivae normal  Neck: neck supple and non tender without mass   Pulmonary/Chest: clear to auscultation bilaterally- no wheezes, rales or rhonchi, normal air movement, no respiratory distress  Cardiovascular: normal rate, normal S1 and S2 and no carotid bruits  Abdomen: soft, non-tender, non-distended, normal bowel sounds, no masses or organomegaly  Extremities: Left lower extremity shortened and externally rotated. Did not attempt to move the leg as patient was very uncomfortable  Neurologic: no cranial nerve deficit and speech normal    LABS:  Recent Labs     12/16/22  1226      K 4.7      CO2 21*   BUN 10   CREATININE 0.5   GLUCOSE 471*   CALCIUM 9.2       Recent Labs     12/16/22  1226   WBC 13.4*   RBC 5.12   HGB 15.5   HCT 44.9   MCV 87.7   MCH 30.3   MCHC 34.5   RDW 13.6      MPV 11.5       Radiology:     XR HIP LEFT (2-3 VIEWS)  XR FEMUR LEFT (MIN 2 VIEWS)    Result Date: 12/16/2022  EXAMINATION: TWO XRAY VIEWS OF THE LEFT HIP;   XRAY VIEWS OF THE LEFT FEMUR 12/16/2022 12:07 pm COMPARISON: None.  HISTORY: ORDERING SYSTEM PROVIDED HISTORY: left hip pain TECHNOLOGIST PROVIDED HISTORY: Reason for exam:->left hip pain FINDINGS: Mildly comminuted and displaced intertrochanteric fracture at the left femur. Intact pelvis. No significant hip or SI joint arthropathy. Normal soft tissues. Mildly displaced and comminuted intertrochanteric fracture at the left femur. CT HEAD WO CONTRAST  CT CSpine W/O Contrast    Result Date: 12/16/2022  EXAMINATION: CT OF THE HEAD WITHOUT CONTRAST; CT OF THE CERVICAL SPINE WITHOUT CONTRAST 12/16/2022 12:39 pm TECHNIQUE: CT of the head was performed without the administration of intravenous contrast. Automated exposure control, iterative reconstruction, and/or weight based adjustment of the mA/kV was utilized to reduce the radiation dose to as low as reasonably achievable.; CT of the cervical spine was performed without the administration of intravenous contrast. Multiplanar reformatted images are provided for review. Automated exposure control, iterative reconstruction, and/or weight based adjustment of the mA/kV was utilized to reduce the radiation dose to as low as reasonably achievable. COMPARISON: None. HISTORY: ORDERING SYSTEM PROVIDED HISTORY: fall TECHNOLOGIST PROVIDED HISTORY: Reason for exam:->fall Has a \"code stroke\" or \"stroke alert\" been called? ->No Decision Support Exception - unselect if not a suspected or confirmed emergency medical condition->Emergency Medical Condition (MA); ORDERING SYSTEM FINDINGS: Brain CT scan: No evidence of parenchymal hemorrhages or contusions. No evidence of intra or extra-axial fluid collection is seen. Areas of low attenuation visualized in the periventricular and subcortical white matter demonstrate no significant change in comparison to the prior study consistent with mild chronic microvascular disease. Prominence of the ventricles and sulci is visualized demonstrating no change consistent with chronic atrophic brain changes unremarkable for the patient's age.  No evidence of intracranial mass or mass effect, no evidence of midline shift is seen. No evidence of sellar or parasellar mass is seen. The visualized portion of the orbits demonstrate no acute abnormality. The visualized paranasal sinuses and mastoid air cells demonstrate no acute abnormality. No acute abnormality of the visualized skull. Mild soft tissue prominence visualized in the right frontal scalp subcutaneous soft tissues. Cervical spine CT scan: Straightening of the alignment of the columns of the cervical spine is visualized. No evidence of compression deformity of the cervical vertebral bodies. Multilevel degenerative endplate changes visualized with decreased intervertebral disc height visualized most prominent at C4-C5. Mild degenerative disc osteophyte complex, uncovertebral disease and hypertrophic changes of the facet joints is visualized. No abnormal density visualized within the spinal canal. The prevertebral soft tissues are unremarkable. Limited evaluation the upper lung fields is unremarkable. No acute intracranial abnormality. No acute osseous abnormality of the cervical spine. Degenerative changes of the cervical spine. EKG: NSR at rate 99 with no acute ischemic changes. No PVCs as noted by computer (my read)      ASSESSMENT:      Principal Problem:    Intertrochanteric fracture of left hip, closed, initial encounter ([facility]  Resolved Problems:    * No resolved hospital problems. *      PLAN:      Intertrochanteric fracture left hip s/p mechani  -EKG nonischemic, patient with a physical job and doing active household chores with METS level of 4. Furthermore, RCRI score of 1 due to insulin use. Medically stable for planned surgery.  -Pain control as per orthopedics  -SCDs for DVT prophylaxis and transition to aspirin as per orthopedics    2. Uncontrolled type II DM on long term insulin  -Placed on home basal insulin equivalent starting in the morning  -hold metformin but will placed on medium dose SSI  -Check A1c in the morning.

## 2022-12-16 NOTE — DISCHARGE INSTRUCTIONS
Orthopedics Discharge Instructions   Weight bearing Status - Weight bearing as tolerated - Operative Extermity  Pain medication Per Prescription  Ice to operative/injured site for 15-30 minutes of each hour for next 3-5 days    Elevate operative/injured limb on 2 pillows at home  Continue DVT Prophylaxis as Prescribed  Wound care - Leave aquacell dressing in place until 22 then may remove  Follow Up in Office in 2 weeks with Dr. Martina Weems    Call the office for directions or with any questions. Watch for these significant complications. Call physician if they or any other problems occur:  Fever over 101°, redness, swelling or warmth at the operative site  Unrelieved nausea    Foul smelling or cloudy drainage at the operative site   Unrelieved pain    Blood soaked dressing. (Some oozing may be normal)     Numb, pale, blue, cold or tingling extremity    Your information:  Name: Rachele Olson  : 1975    Your instructions:  Discharge Home with [facility]    What to do after you leave the hospital:    Recommended diet: diabetic diet    The following personal items were collected during your admission and were returned to you:    Belongings  Dental Appliances: None  Vision - Corrective Lenses: None  Hearing Aid: None  Clothing: At home  Jewelry: None, At home  Body Piercings Removed: No  Electronic Devices: Cell Phone,   Weapons (Notify Protective Services/Security): None  Other Valuables: At home  Home Medications: None  Valuables Given To: Patient, Family (Comment)  Provide Name(s) of Who Valuable(s) Were Given To: na  Responsible person(s) in the waiting room: na  Patient approves for provider to speak to responsible person post operatively: Yes    Information obtained by:  By signing below, I understand that if any problems occur once I leave the hospital I am to contact PCP. I understand and acknowledge receipt of the instructions indicated above.      Open Reduction With Internal Fixation of a Limb: What to Expect at [facility] broken bone (fracture) was put into position and stabilized. You can expect some pain and swelling around the cut (incision) the doctor made. This should get better within a few days after your surgery. But it is normal to have some pain for 2 to 3 weeks after surgery and mild pain for up to 6 weeks after surgery. How soon you can return to work and your normal routine depends on your job and how long it takes the bone to heal. For example, if you have a fractured leg and you sit at work, you may be able to go back in 1 to 2 weeks. But if your job requires you to walk or stand a lot, you will need to wait until your fracture has healed before you go back to work. This care sheet gives you a general idea about how long it will take for you to recover. But each person recovers at a different pace. Follow the steps below to get better as quickly as possible. How can you care for yourself at home? Activity    Rest when you feel tired. Getting enough sleep will help you recover. Increase your activity as recommended by your doctor. Being active boosts blood flow and helps prevent pneumonia and constipation. It's usually okay to exercise other parts of your body as soon as you feel well enough. Avoid putting weight on your repaired bone until your doctor says it is okay. You will probably need to take 1 to 2 weeks off from work. It depends on the type of work you do and how you feel. Do not shower for 1 or 2 days after surgery. When you shower, keep your dressing and incisions dry. If you have a cast, tape a sheet of plastic to cover it so that it does not get wet. It may help to sit on a shower stool. Do not take a bath, swim, use a hot tub, or soak your affected limb until your incision is healed. This usually takes 1 to 2 weeks. Diet    You can eat your normal diet.  If your stomach is upset, try bland, low-fat foods like plain rice, broiled chicken, toast, and yogurt. Medicines    Your doctor will tell you if and when you can restart your medicines. He or she will also give you instructions about taking any new medicines. If you stopped taking aspirin or some other blood thinner, your doctor will tell you when to start taking it again. Take pain medicines exactly as directed. If the doctor gave you a prescription medicine for pain, take it as prescribed. If you are not taking a prescription pain medicine, ask your doctor if you can take an over-the-counter medicine. If you think your pain medicine is making you sick to your stomach: Take your medicine after meals (unless your doctor has told you not to). Ask your doctor for a different pain medicine. If your doctor prescribed antibiotics, take them as directed. Do not stop taking them just because you feel better. You need to take the full course of antibiotics. Incision care    If you have strips of tape on the incision, leave the tape on for a week or until it falls off. If you do not have a cast, clean the incision 2 times a day after your doctor allows you to remove the bandage. Use only soap and water to clean the incision unless your doctor gives you different instructions. Don't use hydrogen peroxide or alcohol, which can slow healing. Exercise    Do exercises as instructed by your doctor or physical therapist. These exercises will help keep your muscles strong and your joints flexible while your bone is healing. Wiggle your fingers or toes on the injured arm or leg often. This helps reduce swelling and stiffness. Ice and elevation    Prop up the injured arm or leg on a pillow when you ice it or anytime you sit or lie down during the first 1 to 2 weeks after your surgery. Try to keep it above the level of your heart. This will help reduce swelling and pain. Other instructions    If you have a cast or splint:  Keep it dry.   If you have a removable splint, ask your doctor if it is okay to take it off to bathe. Your doctor may want you to keep it on as much as possible. Be careful not to put the splint on too tight. Do not stick objects such as pencils or coat hangers in your cast or splint to scratch your skin. Do not put powder into your cast or splint to relieve itchy skin. Never cut or alter your cast or splint. Follow-up care is a key part of your treatment and safety. Be sure to make and go to all appointments, and call your doctor if you are having problems. It's also a good idea to know your test results and keep a list of the medicines you take. When should you call for help? Call 911 anytime you think you may need emergency care. For example, call if:    You passed out (lost consciousness). You have severe trouble breathing. You have sudden chest pain and shortness of breath, or you cough up blood. Call your doctor now or seek immediate medical care if:    You have pain that does not get better after you take pain medicine. Your fingers or toes on the injured arm or leg are cool, pale, or change color. You have tingling or numbness in your fingers or toes. You cannot move your fingers or toes. Your cast or splint feels too tight. The skin under your cast or splint is burning or stinging. You have signs of infection, such as: Increased pain, swelling, warmth, or redness. Red streaks leading from the incision. Pus draining from the incision. A fever. You have drainage or a bad smell coming from the cast or splint. You have signs of a blood clot, such as:  Pain in your calf, back of the knee, thigh, or groin. Redness and swelling in your leg or groin. You have new or worse nausea or vomiting. You are too sick to your stomach to drink any fluids. You cannot keep down fluids.    Watch closely for any changes in your health, and be sure to contact your doctor if:    You have any problems with your cast or splint. Current as of: March 9, 2022               Content Version: 13.5  © 2006-2022 [employer]. Care instructions adapted under license by [facility] ([facility]). If you have questions about a medical condition or this instruction, always ask your healthcare professional. [place] [address] any warranty or liability for your use of this information.

## 2022-12-16 NOTE — ANESTHESIA PRE PROCEDURE
Department of Anesthesiology  Preprocedure Note       Name:  Esmer Gonzalez   Age:  41 y.o.  :  1975                                          MRN:  [id number]         Date:  2022      Surgeon: Daisy Tom):  Lupis Ventura DO    Procedure: Procedure(s):  LEFT HIP OPEN REDUCTION INTERNAL FIXATION (SHARATH TABLE)    Medications prior to admission:   Prior to Admission medications    Medication Sig Start Date End Date Taking?  Authorizing Provider   doxazosin (CARDURA) 1 MG tablet  22   Historical Provider, MD   anastrozole (ARIMIDEX) 1 MG tablet  22   Historical Provider, MD   sucralfate (CARAFATE) 1 GM/10ML suspension Take 10 mLs by mouth 4 times daily 22   Washington Romeo MD   ondansetron (ZOFRAN) 4 MG tablet Take 4 mg by mouth every 8 hours as needed for Nausea or Vomiting    Historical Provider, MD   Dulaglutide 1.5 MG/0.5ML SOPN Inject 1.5 mg into the skin once a week 22   Washington Romeo MD   Insulin Degludec (TRESIBA FLEXTOUCH) 100 UNIT/ML SOPN Inject 36 Units into the skin in the morning and at bedtime 22   Washington Romeo MD   Insulin Pen Needle 32G X 6 MM MISC 1 Device by Does not apply route daily 22   Washington Romeo MD   atorvastatin (LIPITOR) 40 MG tablet take 1 tablet by mouth once daily 22   Washington Romeo MD   omeprazole (PRILOSEC) 20 MG delayed release capsule omeprazole 20 mg capsule,delayed release  take 1 capsule by mouth twice a day as directed 22   Washington Romeo MD   metFORMIN (GLUCOPHAGE) 1000 MG tablet Take 1 tablet by mouth 2 times daily (with meals) 22  Washington Romeo MD   lisinopril (PRINIVIL;ZESTRIL) 20 MG tablet Take 1 tablet by mouth daily 22   Washington Romeo MD   tamoxifen (NOLVADEX) 20 MG tablet Take 20 mg by mouth nightly    Historical Provider, MD   B-DIANA INS SYRINGE 0.5CC/31G/16 31G X \" 0.5 ML MISC  5/3/16   Historical Provider, MD   ONE TOUCH LANCETS MISC Check blood sugar bid 2/3/16   Jaxson Schultz MD Saturnino       Current medications:    Current Facility-Administered Medications   Medication Dose Route Frequency Provider Last Rate Last Admin    ceFAZolin (ANCEF) 2,000 mg in sterile water 20 mL IV syringe  2,000 mg IntraVENous On Call to [address] [place] [address],         0.9 % sodium chloride bolus  500 mL IntraVENous Once Ivet Pronto,  mL/hr at 12/16/22 1636 500 mL at 12/16/22 1636    tranexamic acid (CYKLOKAPRON) 1,000 mg in sodium chloride 0.9 % 100 mL IVPB  1,000 mg IntraVENous Once Ivet Pronto, DO        [START ON 12/17/2022] anastrozole (ARIMIDEX) tablet 1 mg  1 mg Oral Daily Wendy Nunn MD        [START ON 12/17/2022] doxazosin (CARDURA) tablet 1 mg  1 mg Oral Daily Wendy Nunn MD        [START ON 12/17/2022] pantoprazole (PROTONIX) tablet 40 mg  40 mg Oral QAM AC Wendy Nunn MD        [START ON 12/17/2022] sucralfate (CARAFATE) tablet 1 g  1 g Oral 4x Daily AC & HS Wendy Nunn MD        atorvastatin (LIPITOR) tablet 40 mg  40 mg Oral Nightly Wendy Nunn MD        sodium chloride flush 0.9 % injection 5-40 mL  5-40 mL IntraVENous 2 times per day Wendy Nunn MD        sodium chloride flush 0.9 % injection 5-40 mL  5-40 mL IntraVENous PRN Wendy Nunn MD        0.9 % sodium chloride infusion   IntraVENous PRN Wendy Nunn MD        ondansetron (ZOFRAN-ODT) disintegrating tablet 4 mg  4 mg Oral Q8H PRN Wendy Nunn MD        Or    ondansetron [facility]) injection 4 mg  4 mg IntraVENous Q6H PRN Wendy Nunn MD        polyethylene glycol (GLYCOLAX) packet 17 g  17 g Oral Daily PRN Wendy Nunn MD        acetaminophen (TYLENOL) tablet 650 mg  650 mg Oral Q6H PRN Wendy Nunn MD        Or    acetaminophen (TYLENOL) suppository 650 mg  650 mg Rectal Q6H PRN Wendy Nunn MD        insulin glargine (LANTUS) injection vial 28 Units  28 Units SubCUTAneous BID Wendy Nunn MD         Current Outpatient Medications   Medication Sig Dispense Refill    doxazosin (CARDURA) 1 MG tablet       anastrozole (ARIMIDEX) 1 MG tablet       sucralfate (CARAFATE) 1 GM/10ML suspension Take 10 mLs by mouth 4 times daily 1200 mL 3    ondansetron (ZOFRAN) 4 MG tablet Take 4 mg by mouth every 8 hours as needed for Nausea or Vomiting      Dulaglutide 1.5 MG/0.5ML SOPN Inject 1.5 mg into the skin once a week 2 pen 1    Insulin Degludec (TRESIBA FLEXTOUCH) 100 UNIT/ML SOPN Inject 36 Units into the skin in the morning and at bedtime 6 pen 1    Insulin Pen Needle 32G X 6 MM MISC 1 Device by Does not apply route daily 100 each 12    atorvastatin (LIPITOR) 40 MG tablet take 1 tablet by mouth once daily 90 tablet 1    omeprazole (PRILOSEC) 20 MG delayed release capsule omeprazole 20 mg capsule,delayed release  take 1 capsule by mouth twice a day as directed 90 capsule 1    metFORMIN (GLUCOPHAGE) 1000 MG tablet Take 1 tablet by mouth 2 times daily (with meals) 180 tablet 1    lisinopril (PRINIVIL;ZESTRIL) 20 MG tablet Take 1 tablet by mouth daily 90 tablet 1    tamoxifen (NOLVADEX) 20 MG tablet Take 20 mg by mouth nightly      B-D INS SYRINGE 0.5CC/31GX5/16 31G X 5/16\" 0.5 ML MISC   0    ONE TOUCH LANCETS MISC Check blood sugar bid 100 each 12       Allergies:  No Known Allergies    Problem List:    Patient Active Problem List   Diagnosis Code    Migraines, neuralgic G44.009    Hyperlipidemia with target LDL less than 70 E78.5    Tobacco abuse Z72.0    GERD (gastroesophageal reflux disease) K21.9    Malignant tumor of breast ([facility] C50.919    Malignant neoplasm of lower-outer quadrant of right female breast [facility] C50.511    Status post chemoradiation Z92.3, Z92.21    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 G62.9    Carcinoma in situ of uterine cervix D06.9    Type 2 diabetes mellitus without complication (HCC) L95.9    Intertrochanteric fracture of left hip, closed, initial encounter ([facility] J92.508I Past Medical History:        Diagnosis Date    BRCA1 gene mutation positive     Patient put herself and it was postive per patient    [facility] cancer ([facility]     Cancer ([facility]     Breast    Diabetes mellitus ([facility]     Headache(784.0)     migraines    Hyperlipidemia     Hypertension     Kidney stone     Lumbar herniated disc 2012    x3    Migraine     Pancreatitis 07/2022    [facility] at [facility]       Past Surgical History:        Procedure Laterality Date    ANKLE SURGERY      BREAST BIOPSY      BREAST LUMPECTOMY      CHOLECYSTECTOMY      HYSTERECTOMY (CERVIX STATUS UNKNOWN)      LEEP      4 PROCEDURES    TIBIA FRACTURE SURGERY      TONSILLECTOMY      TUBAL LIGATION         Social History:    Social History     Tobacco Use    Smoking status: Every Day     Packs/day: 0.50     Years: 1.00     Pack years: 0.50     Types: Cigarettes    Smokeless tobacco: Never   Substance Use Topics    Alcohol use:  No                                Ready to quit: Not Answered  Counseling given: Not Answered      Vital Signs (Current):   Vitals:    12/16/22 1155   BP: (!) 120/94   Pulse: 88   Resp: 18   Temp: 36.7 °C (98 °F)   TempSrc: Oral   SpO2: 100%                                              BP Readings from Last 3 Encounters:   12/16/22 (!) 120/94   09/19/22 122/76   08/24/22 123/89       NPO Status:  2230 12/15/22                                                                               BMI:   Wt Readings from Last 3 Encounters:   09/19/22 175 lb (79.4 kg)   08/24/22 185 lb (83.9 kg)   08/01/22 200 lb (90.7 kg)     There is no height or weight on file to calculate BMI.    CBC:   Lab Results   Component Value Date/Time    WBC 13.4 12/16/2022 12:26 PM    RBC 5.12 12/16/2022 12:26 PM    HGB 15.5 12/16/2022 12:26 PM    HCT 44.9 12/16/2022 12:26 PM    MCV 87.7 12/16/2022 12:26 PM    RDW 13.6 12/16/2022 12:26 PM     12/16/2022 12:26 PM       CMP:   Lab Results   Component Value Date/Time     12/16/2022 12:26 PM    K 4.7 12/16/2022 12:26 PM    K 3.9 06/29/2022 11:50 AM     12/16/2022 12:26 PM    CO2 21 12/16/2022 12:26 PM    BUN 10 12/16/2022 12:26 PM    CREATININE 0.5 12/16/2022 12:26 PM    GFRAA >60 08/27/2022 10:37 AM    LABGLOM >60 12/16/2022 12:26 PM    GLUCOSE 471 12/16/2022 12:26 PM    GLUCOSE 342 02/26/2012 08:55 PM    PROT 6.7 06/29/2022 11:50 AM    CALCIUM 9.2 12/16/2022 12:26 PM    BILITOT 0.2 06/29/2022 11:50 AM    ALKPHOS 80 06/29/2022 11:50 AM    AST 12 06/29/2022 11:50 AM    ALT 9 06/29/2022 11:50 AM       POC Tests: No results for input(s): POCGLU, POCNA, POCK, POCCL, POCBUN, POCHEMO, POCHCT in the last 72 hours. Coags:   Lab Results   Component Value Date/Time    APTT 35.1 12/16/2022 02:48 PM       HCG (If Applicable):   Lab Results   Component Value Date    PREGTESTUR NEGATIVE 04/20/2022        ABGs: No results found for: PHART, PO2ART, YCN0IAB, BLF8JRI, BEART, S2RUSHWT     Type & Screen (If Applicable):  No results found for: LABABO, LABRH    Drug/Infectious Status (If Applicable):  No results found for: HIV, HEPCAB    COVID-19 Screening (If Applicable):   Lab Results   Component Value Date/Time    COVID19 Not Detected 09/19/2022 08:05 AM    COVID19 Not Detected 01/04/2022 10:04 AM           Anesthesia Evaluation  Patient summary reviewed   history of anesthetic complications: PONV. Airway: Mallampati: III  TM distance: >3 FB   Neck ROM: full  Mouth opening: > = 3 FB   Dental: normal exam     Comment: Patient denies loose. Pulmonary: breath sounds clear to auscultation  (+) current smoker (1 pack a day 30 years)          Patient smoked on day of surgery.                  Cardiovascular:    (+) hypertension:, hyperlipidemia      ECG reviewed  Rhythm: regular  Rate: normal  Echocardiogram reviewed         Beta Blocker:  Not on Beta Blocker         Neuro/Psych:   (+) neuromuscular disease (peripheral neuropathy):, headaches: migraine headaches,             GI/Hepatic/Renal: (+) GERD:,          ROS comment: Hx of pancreatitis resolved. Endo/Other:    (+) DiabetesType II DM, poorly controlled, , malignancy/cancer (breast CA 2015). Abdominal:             Vascular: negative vascular ROS. Other Findings:           Anesthesia Plan      general     ASA 2     (Patient medically cleared without further testing or work-up)  Induction: intravenous. continuous noninvasive hemodynamic monitor  MIPS: Postoperative opioids intended and Prophylactic antiemetics administered. Anesthetic plan and risks discussed with patient. Plan discussed with CRNA.     Attending anesthesiologist reviewed and agrees with Preprocedure content                ENEDINA Ocampo - CRNA   12/16/2022

## 2022-12-16 NOTE — CONSULTS
Department of Orthopedic Surgery  Resident Consult Note    Reason for Consult:  Left hip pain    HISTORY OF PRESENT ILLNESS:       Patient is a 41 y.o. female who presents with pain in the left hip. Patient reports that she fell at home on a 5-inch concrete after tripping on a rug, landing on the left hip. She reports immediate pain and inability to ambulate afterward. Ambulance brought patient of the emergency room. She only complains of left hip. Patient orthopedic history is significant for right leg fracture fixed with plate and screws at the age of 14. Her medical history is significant for breast cancer for which she is in remission for the last 6 years. She denies any known or diagnosed bone metastases. She also has diabetes for which she takes metformin. Patient adds also having diabetic neuropathy in the bilateral lower extremities. She is an independent ambulator at baseline and lives at home with her . Denies numbness/tingling/paresthesias. Denies any other orthopedic complaints at this time. Past Medical History:        Diagnosis Date    BRCA1 gene mutation positive     Patient put herself and it was postive per patient    Breast cancer ([facility]     Cancer ([facility]     Breast    Diabetes mellitus ([facility]     Headache(784.0)     migraines    Hyperlipidemia     Hypertension     Kidney stone     Lumbar herniated disc 2012    x3    Migraine     Pancreatitis 07/2022    tx at [facility]     Past Surgical History:        Procedure Laterality Date    ANKLE SURGERY      BREAST BIOPSY      BREAST LUMPECTOMY      CHOLECYSTECTOMY      HYSTERECTOMY (CERVIX STATUS UNKNOWN)      LEEP      4 PROCEDURES    TIBIA FRACTURE SURGERY      TONSILLECTOMY      TUBAL LIGATION       Current Medications:   Current Facility-Administered Medications: fentaNYL (SUBLIMAZE) injection 50 mcg, 50 mcg, IntraVENous, Once  Allergies:  Patient has no known allergies.     Social History:   TOBACCO:   reports that she has been smoking cigarettes. She has a 0.50 pack-year smoking history. She has never used smokeless tobacco.  ETOH:   reports no history of alcohol use. DRUGS:   reports that she does not currently use drugs after having used the following drugs: Marijuana Asha Cotton).   ACTIVITIES OF DAILY LIVING:    OCCUPATION:    Family History:       Problem Relation Age of Onset    Asthma Daughter     Brain Cancer Daughter     Diabetes Mother     Alcohol Abuse Father     Heart Attack Father     Hypotension Father     Cancer Father         lung    Lung Cancer Father     Cancer Maternal Grandmother     Diabetes Maternal Grandmother     Cancer Maternal Grandfather     Kidney Cancer Maternal Grandfather     Cancer Paternal Grandmother     Cancer Paternal Grandfather     Asthma Son        REVIEW OF SYSTEMS:  CONSTITUTIONAL:  negative for acute  fevers, chills  EYES:  negative for acute blurred vision, visual disturbance  HEENT:  negative for acute hearing loss, voice change  RESPIRATORY:  negative for  acute dyspnea, wheezing  CARDIOVASCULAR:  negative for  acute chest pain, palpitations  GASTROINTESTINAL:  negative for acute nausea, vomiting  HEMATOLOGIC/LYMPHATIC:  negative for acute bleeding and petechiae  MUSCULOSKELETAL: See HPI  NEUROLOGICAL:  negative for acute headaches, dizziness  BEHAVIOR/PSYCH:  negative for acute increased agitation and anxiety    PHYSICAL EXAM:    VITALS:  BP (!) 120/94   Pulse 88   Temp 98 °F (36.7 °C) (Oral)   Resp 18   LMP 08/16/2011   SpO2 100%   CONSTITUTIONAL:  awake, alert, cooperative, no apparent distress, and appears stated age  MUSCULOSKELETAL:  Left lower Extremity:  Short and external rotated with slight flexion of the hip and knee joints   Skin is intact circumferentially  +TTP left hip   Compartments soft and compressible  Palpable dorsalis pedis and posterior tibialis pulse, brisk cap refill to toes, foot warm and perfused  Sensation intact to light touch in sural/deep peroneal/superficial peroneal/saphenous/posterior tibial nerve distributions to foot/ankle  Demonstrates active ankle plantar/dorsiflexion/great toe extension     Secondary Exam:   bilateralUE: No obvious signs of trauma. -TTP to fingers, hand, wrist, forearm, elbow, humerus, shoulder or clavicle. -- Patient able to flex/extend fingers, wrist, elbow and shoulder with active and passive ROM without pain, +2/4 Radial pulse, cap refill <3sec, +AIN/PIN/Radial/Ulnar/Median N, distal sensation grossly intact to C4-T1 dermatomes, compartments soft and compressible. rightLE: No obvious signs of trauma. -TTP to foot, ankle, leg, knee, thigh, hip.-- Patient able to flex/extend toes, ankle, knee and hip with active and passive ROM without pain,+2/4 DP & PT pulses, cap refill <3sec, +5/5 PF/DF/EHL, distal sensation grossly intact to L4-S1 dermatomes, compartments soft and compressible. DATA:    CBC:   Lab Results   Component Value Date/Time    WBC 13.4 12/16/2022 12:26 PM    RBC 5.12 12/16/2022 12:26 PM    HGB 15.5 12/16/2022 12:26 PM    HCT 44.9 12/16/2022 12:26 PM    MCV 87.7 12/16/2022 12:26 PM    MCH 30.3 12/16/2022 12:26 PM    MCHC 34.5 12/16/2022 12:26 PM    RDW 13.6 12/16/2022 12:26 PM     12/16/2022 12:26 PM    MPV 11.5 12/16/2022 12:26 PM     PT/INR:  No results found for: PROTIME, INR    Radiology Review:  X ray left hip demonstrates a valgus impacted intertrochanteric femoral neck fracture with a displaced less trochanteric fragment.  No other acute fracture or dislocation noted in the right hip and pelvic joints    X ray left femur demonstrates no acute fracture or dislocation    IMPRESSION:  Left intertrochanteric femoral neck fracture    PLAN:  Nonweightbearing left lower extremity, position of comfort  Multimodal pain control by primary team  Hold anticoagulants  N.p.o. now  Informed consent  Preop labs and imaging  Appreciate villafuerte perioperative medical optimization  Plan for left hip ORIF with Dr Remi Sahu later this afternoon  Risk, benefits and treatment options were discussed and has verbalized understanding of options. The possibility of complications were also discussed to include but not limited to nerve damage, infection, problems with wound healing, vascular injury, chronic pain, stiffness, dysfunction, nonhealing of the bone, symptomatic hardware and/or its failure, need for subsequent surgery, dislocation, and blood clots as well as medical related problems and other problems not specifically discussed. Risk of anesthesia also discussed to include death. After all questions and concerns were address, patient agreed to proceed with the procedure. All patient/family questions have been answered and patient is currently agreeable to plan.   Discuss with Attending    Marianna Manley DO  Orthopaedic Surgery Resident PGY-1

## 2022-12-16 NOTE — PROGRESS NOTES
Interval Progress Note    Discussed with Dr Alejandro Benitez. Imaging reviewed. Planned for ORIF later this afternoon pending clearance by medicine.     Ashutosh Kumar DO  Orthopaedic Surgery Resident PGY-1

## 2022-12-16 NOTE — CARE COORDINATION
Patient is medically stable for surgery. She has an RCRI score of one-point due to her use of insulin. Patient with no limitations prior to injury. Able to do chores around the house including vacuuming and carrying laundry as well as boxes without difficulty (MET level of 4)    EKG without ischemic findings. No need for further testing prior to surgery.     Electronically signed by Nabil Coy MD on 12/16/2022 at 4:39 PM

## 2022-12-17 LAB
METER GLUCOSE: 298 MG/DL (ref 74–99)
METER GLUCOSE: 307 MG/DL (ref 74–99)
METER GLUCOSE: 325 MG/DL (ref 74–99)
METER GLUCOSE: 333 MG/DL (ref 74–99)
METER GLUCOSE: 425 MG/DL (ref 74–99)

## 2022-12-17 PROCEDURE — 6360000002 HC RX W HCPCS: Performed by: INTERNAL MEDICINE

## 2022-12-17 PROCEDURE — 97161 PT EVAL LOW COMPLEX 20 MIN: CPT

## 2022-12-17 PROCEDURE — 6370000000 HC RX 637 (ALT 250 FOR IP): Performed by: INTERNAL MEDICINE

## 2022-12-17 PROCEDURE — 2580000003 HC RX 258: Performed by: ORTHOPAEDIC SURGERY

## 2022-12-17 PROCEDURE — 0QS704Z REPOSITION LEFT UPPER FEMUR WITH INTERNAL FIXATION DEVICE, OPEN APPROACH: ICD-10-PCS | Performed by: ORTHOPAEDIC SURGERY

## 2022-12-17 PROCEDURE — 6360000002 HC RX W HCPCS: Performed by: ORTHOPAEDIC SURGERY

## 2022-12-17 PROCEDURE — 99232 SBSQ HOSP IP/OBS MODERATE 35: CPT | Performed by: INTERNAL MEDICINE

## 2022-12-17 PROCEDURE — 1200000000 HC SEMI PRIVATE

## 2022-12-17 PROCEDURE — 97110 THERAPEUTIC EXERCISES: CPT

## 2022-12-17 PROCEDURE — 82962 GLUCOSE BLOOD TEST: CPT

## 2022-12-17 PROCEDURE — 6370000000 HC RX 637 (ALT 250 FOR IP): Performed by: PHYSICAL THERAPY ASSISTANT

## 2022-12-17 PROCEDURE — 6360000002 HC RX W HCPCS: Performed by: STUDENT IN AN ORGANIZED HEALTH CARE EDUCATION/TRAINING PROGRAM

## 2022-12-17 PROCEDURE — 6370000000 HC RX 637 (ALT 250 FOR IP): Performed by: ORTHOPAEDIC SURGERY

## 2022-12-17 PROCEDURE — 2580000003 HC RX 258: Performed by: INTERNAL MEDICINE

## 2022-12-17 RX ORDER — INSULIN LISPRO 100 [IU]/ML
0-8 INJECTION, SOLUTION INTRAVENOUS; SUBCUTANEOUS
Status: DISCONTINUED | OUTPATIENT
Start: 2022-12-17 | End: 2022-12-21 | Stop reason: HOSPADM

## 2022-12-17 RX ORDER — INSULIN LISPRO 100 [IU]/ML
0-4 INJECTION, SOLUTION INTRAVENOUS; SUBCUTANEOUS NIGHTLY
Status: DISCONTINUED | OUTPATIENT
Start: 2022-12-17 | End: 2022-12-17 | Stop reason: SDUPTHER

## 2022-12-17 RX ORDER — POLYETHYLENE GLYCOL 3350 17 G/17G
17 POWDER, FOR SOLUTION ORAL DAILY
Status: DISCONTINUED | OUTPATIENT
Start: 2022-12-17 | End: 2022-12-21 | Stop reason: HOSPADM

## 2022-12-17 RX ORDER — ASPIRIN 81 MG/1
81 TABLET, CHEWABLE ORAL 2 TIMES DAILY
Status: DISCONTINUED | OUTPATIENT
Start: 2022-12-17 | End: 2022-12-21 | Stop reason: HOSPADM

## 2022-12-17 RX ORDER — MORPHINE SULFATE 4 MG/ML
4 INJECTION, SOLUTION INTRAMUSCULAR; INTRAVENOUS EVERY 4 HOURS PRN
Status: DISCONTINUED | OUTPATIENT
Start: 2022-12-17 | End: 2022-12-17

## 2022-12-17 RX ORDER — 0.9 % SODIUM CHLORIDE 0.9 %
500 INTRAVENOUS SOLUTION INTRAVENOUS ONCE
Status: COMPLETED | OUTPATIENT
Start: 2022-12-17 | End: 2022-12-17

## 2022-12-17 RX ORDER — DOCUSATE SODIUM 100 MG/1
100 CAPSULE, LIQUID FILLED ORAL 2 TIMES DAILY
Status: DISCONTINUED | OUTPATIENT
Start: 2022-12-17 | End: 2022-12-21 | Stop reason: HOSPADM

## 2022-12-17 RX ORDER — OXYCODONE HYDROCHLORIDE 5 MG/1
5 TABLET ORAL EVERY 4 HOURS PRN
Status: DISCONTINUED | OUTPATIENT
Start: 2022-12-17 | End: 2022-12-21 | Stop reason: HOSPADM

## 2022-12-17 RX ORDER — ACETAMINOPHEN 650 MG/1
650 SUPPOSITORY RECTAL EVERY 6 HOURS SCHEDULED
Status: DISCONTINUED | OUTPATIENT
Start: 2022-12-17 | End: 2022-12-21 | Stop reason: HOSPADM

## 2022-12-17 RX ORDER — ACETAMINOPHEN 325 MG/1
650 TABLET ORAL EVERY 6 HOURS SCHEDULED
Status: DISCONTINUED | OUTPATIENT
Start: 2022-12-17 | End: 2022-12-21 | Stop reason: HOSPADM

## 2022-12-17 RX ORDER — MORPHINE SULFATE 4 MG/ML
4 INJECTION, SOLUTION INTRAMUSCULAR; INTRAVENOUS
Status: DISCONTINUED | OUTPATIENT
Start: 2022-12-17 | End: 2022-12-21 | Stop reason: HOSPADM

## 2022-12-17 RX ORDER — MORPHINE SULFATE 2 MG/ML
2 INJECTION, SOLUTION INTRAMUSCULAR; INTRAVENOUS EVERY 4 HOURS PRN
Status: DISCONTINUED | OUTPATIENT
Start: 2022-12-17 | End: 2022-12-17

## 2022-12-17 RX ORDER — MORPHINE SULFATE 2 MG/ML
2 INJECTION, SOLUTION INTRAMUSCULAR; INTRAVENOUS
Status: DISCONTINUED | OUTPATIENT
Start: 2022-12-17 | End: 2022-12-21 | Stop reason: HOSPADM

## 2022-12-17 RX ORDER — OXYCODONE HYDROCHLORIDE AND ACETAMINOPHEN 5; 325 MG/1; MG/1
1 TABLET ORAL ONCE
Status: COMPLETED | OUTPATIENT
Start: 2022-12-17 | End: 2022-12-17

## 2022-12-17 RX ORDER — METHOCARBAMOL 500 MG/1
750 TABLET, FILM COATED ORAL 4 TIMES DAILY
Status: DISCONTINUED | OUTPATIENT
Start: 2022-12-17 | End: 2022-12-21 | Stop reason: HOSPADM

## 2022-12-17 RX ORDER — OXYCODONE HYDROCHLORIDE AND ACETAMINOPHEN 5; 325 MG/1; MG/1
1 TABLET ORAL EVERY 4 HOURS PRN
Status: DISCONTINUED | OUTPATIENT
Start: 2022-12-17 | End: 2022-12-17

## 2022-12-17 RX ORDER — INSULIN LISPRO 100 [IU]/ML
8 INJECTION, SOLUTION INTRAVENOUS; SUBCUTANEOUS ONCE
Status: COMPLETED | OUTPATIENT
Start: 2022-12-17 | End: 2022-12-17

## 2022-12-17 RX ORDER — OXYCODONE HYDROCHLORIDE AND ACETAMINOPHEN 5; 325 MG/1; MG/1
2 TABLET ORAL EVERY 4 HOURS PRN
Status: DISCONTINUED | OUTPATIENT
Start: 2022-12-17 | End: 2022-12-17

## 2022-12-17 RX ADMIN — METHOCARBAMOL 750 MG: 500 TABLET ORAL at 16:19

## 2022-12-17 RX ADMIN — INSULIN LISPRO 8 UNITS: 100 INJECTION, SOLUTION INTRAVENOUS; SUBCUTANEOUS at 03:03

## 2022-12-17 RX ADMIN — INSULIN LISPRO 6 UNITS: 100 INJECTION, SOLUTION INTRAVENOUS; SUBCUTANEOUS at 20:35

## 2022-12-17 RX ADMIN — SUCRALFATE 1 G: 1 TABLET ORAL at 11:07

## 2022-12-17 RX ADMIN — ASPIRIN 81 MG 81 MG: 81 TABLET ORAL at 20:31

## 2022-12-17 RX ADMIN — METHOCARBAMOL 750 MG: 500 TABLET ORAL at 20:30

## 2022-12-17 RX ADMIN — ACETAMINOPHEN 650 MG: 325 TABLET ORAL at 03:02

## 2022-12-17 RX ADMIN — MORPHINE SULFATE 4 MG: 4 INJECTION, SOLUTION INTRAMUSCULAR; INTRAVENOUS at 22:42

## 2022-12-17 RX ADMIN — SODIUM CHLORIDE 500 ML: 9 INJECTION, SOLUTION INTRAVENOUS at 03:02

## 2022-12-17 RX ADMIN — OXYCODONE AND ACETAMINOPHEN 1 TABLET: 5; 325 TABLET ORAL at 07:52

## 2022-12-17 RX ADMIN — SUCRALFATE 1 G: 1 TABLET ORAL at 06:06

## 2022-12-17 RX ADMIN — INSULIN GLARGINE 28 UNITS: 100 INJECTION, SOLUTION SUBCUTANEOUS at 07:58

## 2022-12-17 RX ADMIN — INSULIN LISPRO 4 UNITS: 100 INJECTION, SOLUTION INTRAVENOUS; SUBCUTANEOUS at 11:12

## 2022-12-17 RX ADMIN — ACETAMINOPHEN 650 MG: 325 TABLET ORAL at 11:10

## 2022-12-17 RX ADMIN — OXYCODONE AND ACETAMINOPHEN 1 TABLET: 5; 325 TABLET ORAL at 19:53

## 2022-12-17 RX ADMIN — ANASTROZOLE 1 MG: 1 TABLET ORAL at 07:53

## 2022-12-17 RX ADMIN — MORPHINE SULFATE 4 MG: 4 INJECTION, SOLUTION INTRAMUSCULAR; INTRAVENOUS at 11:07

## 2022-12-17 RX ADMIN — DIPHENHYDRAMINE HYDROCHLORIDE 25 MG: 25 TABLET ORAL at 12:38

## 2022-12-17 RX ADMIN — MORPHINE SULFATE 4 MG: 4 INJECTION, SOLUTION INTRAMUSCULAR; INTRAVENOUS at 18:48

## 2022-12-17 RX ADMIN — METHOCARBAMOL 750 MG: 500 TABLET ORAL at 12:35

## 2022-12-17 RX ADMIN — POLYETHYLENE GLYCOL 3350 17 G: 17 POWDER, FOR SOLUTION ORAL at 18:53

## 2022-12-17 RX ADMIN — INSULIN GLARGINE 28 UNITS: 100 INJECTION, SOLUTION SUBCUTANEOUS at 20:39

## 2022-12-17 RX ADMIN — MORPHINE SULFATE 4 MG: 4 INJECTION, SOLUTION INTRAMUSCULAR; INTRAVENOUS at 15:07

## 2022-12-17 RX ADMIN — ASPIRIN 81 MG 81 MG: 81 TABLET ORAL at 07:52

## 2022-12-17 RX ADMIN — SUCRALFATE 1 G: 1 TABLET ORAL at 16:20

## 2022-12-17 RX ADMIN — DOXAZOSIN 1 MG: 1 TABLET ORAL at 07:53

## 2022-12-17 RX ADMIN — ONDANSETRON 4 MG: 4 TABLET, ORALLY DISINTEGRATING ORAL at 08:01

## 2022-12-17 RX ADMIN — SUCRALFATE 1 G: 1 TABLET ORAL at 20:31

## 2022-12-17 RX ADMIN — ONDANSETRON 4 MG: 2 INJECTION INTRAMUSCULAR; INTRAVENOUS at 22:54

## 2022-12-17 RX ADMIN — INSULIN LISPRO 6 UNITS: 100 INJECTION, SOLUTION INTRAVENOUS; SUBCUTANEOUS at 07:48

## 2022-12-17 RX ADMIN — ACETAMINOPHEN 650 MG: 325 TABLET ORAL at 17:15

## 2022-12-17 RX ADMIN — DOCUSATE SODIUM 100 MG: 100 CAPSULE, LIQUID FILLED ORAL at 20:32

## 2022-12-17 RX ADMIN — SODIUM CHLORIDE, PRESERVATIVE FREE 10 ML: 5 INJECTION INTRAVENOUS at 22:46

## 2022-12-17 RX ADMIN — INSULIN LISPRO 6 UNITS: 100 INJECTION, SOLUTION INTRAVENOUS; SUBCUTANEOUS at 16:21

## 2022-12-17 RX ADMIN — METHOCARBAMOL 750 MG: 500 TABLET ORAL at 07:52

## 2022-12-17 RX ADMIN — ATORVASTATIN CALCIUM 40 MG: 40 TABLET, FILM COATED ORAL at 20:31

## 2022-12-17 RX ADMIN — HYDROCODONE BITARTRATE AND ACETAMINOPHEN 1 TABLET: 7.5; 325 TABLET ORAL at 04:30

## 2022-12-17 RX ADMIN — PANTOPRAZOLE SODIUM 40 MG: 40 TABLET, DELAYED RELEASE ORAL at 06:06

## 2022-12-17 ASSESSMENT — PAIN SCALES - GENERAL
PAINLEVEL_OUTOF10: 8
PAINLEVEL_OUTOF10: 10
PAINLEVEL_OUTOF10: 10
PAINLEVEL_OUTOF10: 9
PAINLEVEL_OUTOF10: 7
PAINLEVEL_OUTOF10: 9
PAINLEVEL_OUTOF10: 9
PAINLEVEL_OUTOF10: 10
PAINLEVEL_OUTOF10: 8
PAINLEVEL_OUTOF10: 4
PAINLEVEL_OUTOF10: 6

## 2022-12-17 ASSESSMENT — PAIN DESCRIPTION - LOCATION
LOCATION: HIP
LOCATION: LEG
LOCATION: HIP
LOCATION: HIP;LEG

## 2022-12-17 ASSESSMENT — PAIN DESCRIPTION - DESCRIPTORS
DESCRIPTORS: ACHING;SHARP
DESCRIPTORS: ACHING;SHARP;DISCOMFORT
DESCRIPTORS: ACHING;STABBING;DISCOMFORT
DESCRIPTORS: ACHING;SHARP

## 2022-12-17 ASSESSMENT — PAIN DESCRIPTION - ORIENTATION
ORIENTATION: LEFT

## 2022-12-17 NOTE — CARE COORDINATION
Ss note:12/17/202212:24 PM Notified by 3rd floor nursing that therapy has worked with pt and she will need skilled rehab. Pt is SELF PAY, per the pt she does not work and her spouse is on disability. Sw spoke with pt via phone, she is agreeable for referral to public benefits to screen pt for Medicaid Eligibility. VM message left for Lisbet, unit SW/CM will need to follow up on Monday.  FUNMILAYO Neville

## 2022-12-17 NOTE — PROGRESS NOTES
Comprehensive Nutrition Assessment    Type and Reason for Visit:  Initial, Positive Nutrition Screen    Nutrition Recommendations/Plan:   Continue current diet   Ordered Ensure HP BID  to optimize nutrition status &      Malnutrition Assessment:  Malnutrition Status: At risk for malnutrition (Comment) (12/17/22 4754)    Context:  Acute Illness     Findings of the 6 clinical characteristics of malnutrition:  Energy Intake:  No significant decrease in energy intake  Weight Loss:  Unable to assess (d/t lack of accurate wt in EMR)     Body Fat Loss:  Unable to assess     Muscle Mass Loss:  Unable to assess    Fluid Accumulation:  No significant fluid accumulation     Strength:  Not Performed    Nutrition Assessment:    Pt admits w/ s/p fall Dx: Lt intertrochanteric femoral neck fracture PMH: DM, HTN, Cancer(Breast). 12/16 s/p ORIF lt hip fracture w/ interlocking gamma nail. No intakes noted. Pt agreed toEnsure HP BID. Tristen order ONS to optimize nutrition status & monitor. Nutrition Related Findings:    A/O x4, Abd WDL, I/O +WDL, +1 RLE & trace LUE edema. labs reviewed. Wound Type: Surgical Incision (lt hip)       Current Nutrition Intake & Therapies:    Average Meal Intake: Unable to assess  Average Supplements Intake: None Ordered  ADULT DIET; Regular; 4 carb choices (60 gm/meal); Low Sodium (2 gm)  ADULT ORAL NUTRITION SUPPLEMENT; Lunch, Dinner; Low Calorie/High Protein Oral Supplement    Anthropometric Measures:  Height: 5' 7\" (170.2 cm)  Ideal Body Weight (IBW): 135 lbs (61 kg)    Admission Body Weight:  (no wt in chart)  Current Body Weight: 170 lb (77.1 kg) (gvlxes71/17, UTO accurate wt), 125.9 % IBW. Current BMI (kg/m2): 26.6  Usual Body Weight: 193 lb (87.5 kg) (6/2022 accurate wt in EMR. JENY for MST -T wt loss d/t lack of accurate wt hx in EMR)  % Weight Change (Calculated): -11.9  Weight Adjustment For: No Adjustment     BMI Categories: Overweight (BMI 25.0-29. 9)    Estimated Daily Nutrient Needs:  Energy Requirements Based On: Formula  Weight Used for Energy Requirements: Current  Energy (kcal/day): 6540-8225  Weight Used for Protein Requirements: Ideal  Protein (g/day):  (1.5-1.7 gm/kg)  Method Used for Fluid Requirements: 1 ml/kcal  Fluid (ml/day): 0852-1606    Nutrition Diagnosis:   Increased nutrient needs, In context of acute illness or injury related to increase demand for energy/nutrients as evidenced by other (comment) (fractured lt hip)    Nutrition Interventions:   Food and/or Nutrient Delivery: Continue Current Diet, Start Oral Nutrition Supplement  Nutrition Education/Counseling: No recommendation at this time  Coordination of Nutrition Care: Continue to monitor while inpatient     Goals:     Goals: PO intake 75% or greater     Nutrition Monitoring and Evaluation:   Behavioral-Environmental Outcomes: None Identified  Food/Nutrient Intake Outcomes: Food and Nutrient Intake, Supplement Intake  Physical Signs/Symptoms Outcomes: Biochemical Data, Chewing or Swallowing, GI Status, Fluid Status or Edema, Weight, Skin, Nutrition Focused Physical Findings    Discharge Planning:     Too soon to determine     Yanira Cruz RD  Contact: [phone]

## 2022-12-17 NOTE — ANESTHESIA POSTPROCEDURE EVALUATION
Department of Anesthesiology  Postprocedure Note    Patient: Giovanni Parnell  MRN: [id number]  YOB: 1975  Date of evaluation: 12/16/2022      Procedure Summary     Date: 12/16/22 Room / Location: [address] [place] [address] 03 / [facility] AT [facility] ([facility])    Anesthesia Start: 1716 Anesthesia Stop: 1906    Procedure: LEFT HIP OPEN REDUCTION INTERNAL FIXATION (Anne España) (Left: Hip) Diagnosis:       Closed displaced intertrochanteric fracture of left femur, initial encounter ([address]      (Closed displaced intertrochanteric fracture of left femur, initial encounter ([address] [N93.842Y])    Surgeons: Parag Guerrero DO Responsible Provider: Xavier Monzon DO    Anesthesia Type: general ASA Status: 2          Anesthesia Type: No value filed.     Elma Phase I: Elma Score: 8    Elma Phase II:        Anesthesia Post Evaluation    Patient location during evaluation: PACU  Patient participation: complete - patient participated  Level of consciousness: awake and alert  Airway patency: patent  Nausea & Vomiting: no nausea and no vomiting  Complications: no  Cardiovascular status: hemodynamically stable  Respiratory status: acceptable  Hydration status: euvolemic

## 2022-12-17 NOTE — PROGRESS NOTES
Department of Orthopedic Surgery  Resident Progress Note    POD 1 s/p L hip ORIF. Patient seen and examined. Pain moderately controlled. No new complaints. Denies chest pain, shortness of breath, dizziness/lightheadedness. VITALS:  /76   Pulse 90   Temp 98.1 °F (36.7 °C) (Oral)   Resp 18   LMP 08/16/2011   SpO2 92%     General: in no acute distress and alert    MUSCULOSKELETAL:   left lower extremity:  Dressing C/D/I, some strike through noted  Compartments soft and compressible  Calf is soft and nontender  +PF/DF/EHL  Brisk Cap refill, Toes warm and perfused  Distal sensation grossly intact to Peroneals, Sural, Saphenous, and tibial nrs    CBC:   Lab Results   Component Value Date/Time    WBC 13.4 12/16/2022 12:26 PM    HGB 11.8 12/16/2022 07:02 PM    HCT 36.6 12/16/2022 07:02 PM     12/16/2022 12:26 PM     PT/INR:  No results found for: PROTIME, INR    ASSESSMENT  S/P L hip ORIF for IT fx - 12/16/22    PLAN      Continue physical therapy and protocol: WBAT - L LE  24 hour abx coverage to be completed today  Deep venous thrombosis prophylaxis - asa 81 mg BID, early mobilization  PT/OT  Pain Control: IV and PO, norco discontinued, percocet added. Tylenol scheduled q6h. Robaxin ordered  D/C Plan:  pending sw/cm and therapy recommendations.  Ok to discharge from orthopedic standpoint once appropriate discharge plan is in place    Electronically signed by DO Harmony on 12/17/2022 at 7:16 AM

## 2022-12-17 NOTE — PLAN OF CARE
Problem: Chronic Conditions and Co-morbidities  Goal: Patient's chronic conditions and co-morbidity symptoms are monitored and maintained or improved  Outcome: Progressing     Problem: Discharge Planning  Goal: Discharge to home or other facility with appropriate resources  Outcome: Progressing  Flowsheets (Taken 12/16/2022 2105)  Discharge to home or other facility with appropriate resources: Identify barriers to discharge with patient and caregiver     Problem: Pain  Goal: Verbalizes/displays adequate comfort level or baseline comfort level  Outcome: Progressing     Problem: Safety - Adult  Goal: Free from fall injury  Outcome: Progressing     Problem: ABCDS Injury Assessment  Goal: Absence of physical injury  Outcome: Progressing

## 2022-12-17 NOTE — PROGRESS NOTES
[address] [place], [place] [address]ist   Progress Note    Admitting Date and Time: 12/16/2022 11:50 AM  Admit Dx: Hyperglycemia [R73.9]  Closed displaced intertrochanteric fracture of left femur, initial encounter ([facility] [address] [S72.142A]  Closed intertrochanteric fracture of hip, left, initial encounter ([facility] [address] [S72.142A]  Intertrochanteric fracture of left hip, closed, initial encounter ([facility] [address] [S72.142A]    Subjective:    Pt states pain is not controlled and states had not been less than 9/10 today. Per RN: worked with therapy but only able to get to edge of bed. ROS: denies fever, chills, cp, sob, n/v, HA unless stated above.      insulin lispro  0-8 Units SubCUTAneous 4x Daily AC & HS    acetaminophen  650 mg Oral 4 times per day    Or    acetaminophen  650 mg Rectal 4 times per day    methocarbamol  750 mg Oral 4x Daily    aspirin  81 mg Oral BID    anastrozole  1 mg Oral Daily    doxazosin  1 mg Oral Daily    pantoprazole  40 mg Oral QAM AC    sucralfate  1 g Oral 4x Daily AC & HS    atorvastatin  40 mg Oral Nightly    sodium chloride flush  5-40 mL IntraVENous 2 times per day    insulin glargine  28 Units SubCUTAneous BID     morphine, 2 mg, Q4H PRN   Or  morphine, 4 mg, Q4H PRN  sodium chloride flush, 5-40 mL, PRN  sodium chloride, , PRN  ondansetron, 4 mg, Q8H PRN   Or  ondansetron, 4 mg, Q6H PRN  polyethylene glycol, 17 g, Daily PRN  glucose, 4 tablet, PRN  dextrose bolus, 125 mL, PRN   Or  dextrose bolus, 250 mL, PRN  glucagon (rDNA), 1 mg, PRN  dextrose, , Continuous PRN  HYDROmorphone, 0.5 mg, Q5 Min PRN  diphenhydrAMINE, 25 mg, Q8H PRN         Objective:    /65   Pulse 84   Temp 99.2 °F (37.3 °C) (Oral)   Resp 18   Ht 5' 7\" (1.702 m)   Wt 170 lb (77.1 kg)   LMP 08/16/2011   SpO2 96%   BMI 26.63 kg/m²   General Appearance: alert and oriented to person, place and time and in no acute distress  Skin: warm and dry  Head: normocephalic and atraumatic  Eyes: pupils equal, round, and reactive to light, extraocular eye movements intact, conjunctivae normal  Neck: neck supple and non tender without mass   Pulmonary/Chest: clear to auscultation bilaterally- no wheezes, rales or rhonchi, normal air movement, no respiratory distress  Cardiovascular: normal rate, normal S1 and S2 and no carotid bruits  Abdomen: soft, non-tender, non-distended, normal bowel sounds, no masses or organomegaly  Extremities: left thigh bandage in place. Neurologic: no cranial nerve deficit and speech normal      Recent Labs     12/16/22  1226      K 4.7      CO2 21*   BUN 10   CREATININE 0.5   GLUCOSE 471*   CALCIUM 9.2       No results for input(s): ALKPHOS, PROT, LABALBU, BILITOT, AST, ALT in the last 72 hours. Recent Labs     12/16/22  1226 12/16/22  1902   WBC 13.4*  --    RBC 5.12  --    HGB 15.5 11.8   HCT 44.9 36.6   MCV 87.7  --    MCH 30.3  --    MCHC 34.5  --    RDW 13.6  --      --    MPV 11.5  --          Radiology:   Fluoro For Surgical Procedures   Final Result   Intraprocedural fluoroscopic spot images as above. See separate procedure   report for more information. XR HIP LEFT (2-3 VIEWS)   Final Result   Mildly displaced and comminuted intertrochanteric fracture at the left femur. XR FEMUR LEFT (MIN 2 VIEWS)   Final Result   Mildly displaced and comminuted intertrochanteric fracture at the left femur. CT HEAD WO CONTRAST   Final Result   No acute intracranial abnormality. No acute osseous abnormality of the cervical spine. Degenerative changes of the cervical spine. CT CSpine W/O Contrast   Final Result   No acute intracranial abnormality. No acute osseous abnormality of the cervical spine. Degenerative changes of the cervical spine. Assessment:  Principal Problem:    Intertrochanteric fracture of left hip, closed, initial encounter [facility])  Resolved Problems:    * No resolved hospital problems.  *      Plan: Intertrochanteric fracture left hip s/p mechani  -EKG nonischemic, patient with a physical job and doing active household chores with METS level of 4. Furthermore, RCRI score of 1 due to insulin use. Medically stable for planned surgery. -POD #1 ORIF with interlocking gamma nail  -Pain control as per orthopedics but will increase morphine frequency to q3 and added Percocet pain panel.   -Start bowel regimen with Miralax daily and colace 100 mg bid  -SCDs for DVT prophylaxis and transition to aspirin as per orthopedics     2. Postop anemia  -EBL >100 ml.  -Hgb trend 15.5-->11.8    3. Uncontrolled type II DM on long term insulin  -Placed on home basal insulin equivalent starting in the morning  -hold metformin but will placed on medium dose SSI  -Check A1c in the morning (did not get drawn)  Last one on file was 11.4% in June 2022. 3.         HTN/HLD  -Continue doxazosin but hold lisinopril till we ensure creatinine is stable post surgery  -Continue atorvastatin     4. GERD  -Continue omeprazole and Carafate       NOTE: This report was transcribed using voice recognition software. Every effort was made to ensure accuracy; however, inadvertent computerized transcription errors may be present.      Electronically signed by Saul Meléndez MD on 12/17/2022 at 6:30 PM

## 2022-12-17 NOTE — PROGRESS NOTES
Physical Therapy Initial Evaluation/Plan of Care    Room #:  3924/1211-52  Patient Name: Vale Cordoba  YOB: 1975  MRN: [id number]    Date of Service: 12/17/2022     Tentative placement recommendation: Subacute rehab  Equipment recommendation: To be determined      Evaluating Physical Therapist: Alejandro Lazar #[id number]     Specific Provider Orders/Date/Referring Provider :   12/16/22 1415    PT eval and treat  Start:  12/16/22 1415,   End:  12/16/22 1415,   ONE TIME,   Standing Count:  1 Occurrences,   R        Last continued at transfer on Fri Dec 16, 2022  8:30 PM    Dylan Cardenas DO     Admitting Diagnosis:   Hyperglycemia [R73.9]  Closed displaced intertrochanteric fracture of left femur, initial encounter [facility]) [S72.142A]  Closed intertrochanteric fracture of hip, left, initial encounter [facility]) [S72.142A]  Intertrochanteric fracture of left hip, closed, initial encounter ([facility] [address] [S72.142A]   Visit diagnosis:     DATE OF PROCEDURE:  12/16/2022    PREOPERATIVE DIAGNOSIS:  Closed, displaced intertrochanteric fracture,    left hip. POSTOPERATIVE DIAGNOSIS:  Closed, displaced intertrochanteric fracture,left hip. OPERATION PERFORMED:  Open reduction and internal fixation of left hip fracture with interlocking gamma nail.     SURGEON:  Catalina Robb DO      Visit Diagnoses         Codes    Hyperglycemia    -  Primary R73.9    Closed intertrochanteric fracture of hip, left, initial encounter [facility])     S72.142A    Closed displaced intertrochanteric fracture of left femur, initial encounter [facility])     S72.142A            Patient Active Problem List   Diagnosis    Migraines, neuralgic    Hyperlipidemia with target LDL less than 70    Tobacco abuse    GERD (gastroesophageal reflux disease)    Malignant tumor of breast ([address]    Malignant neoplasm of lower-outer quadrant of right female breast ([address]    Status post chemoradiation    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 FWB on LLE. L Hip ORIF. Patient was limited in therapy today due to pain. Medicated 1 hour roughly before session. Patient able to sit EOB with maximal assist. Unable to stand this session due to pain. Educated on importance of mobility and weight bearing for healing. The patient will benefit from continued skilled therapy to increase strength and improve balance for safe functional mobility, to decrease risk of falls, and to meet goals at discharge. PHYSICAL THERAPY  PLAN OF CARE       Physical therapy plan of care is established based on physician order,  patient diagnosis and clinical assessment    Current Treatment Recommendations:    -Bed Mobility: Lower extremity exercises , Upper extremity exercises , and Trunk control activities   -Sitting Balance: Incorporate reaching activities to activate trunk muscles , Hands on support to maintain midline , and Facilitate active trunk muscle engagement   -Standing Balance: Perform strengthening exercises in standing to promote motor control with or without upper extremity support   -Transfers: Provide instruction on proper hand and foot position for adequate transfer of weight onto lower extremities and use of gait device if needed and Cues for hand placement, technique and safety. Provide stabilization to prevent fall   -Gait: Gait training and Standing activities to improve: base of support, weight shift, weight bearing      PT long term treatment goals are located in below grid    Patient and or family understand(s) diagnosis, prognosis, and plan of care.     Frequency of treatments: Patient will be seen  twice daily for therapeutic exercise, functional retraining, endurance activities, balance exercises, family and patient education. Prior Level of Function: Patient ambulated independently   Rehab Potential: good for baseline     Past medical history:   Past Medical History:   Diagnosis Date    BRCA1 gene mutation positive     Patient put herself and it was postive per patient    Breast cancer ([facility] [address]     Cancer ([facility] [address]     Breast    Diabetes mellitus ([address]     Headache(784.0)     migraines    Hyperlipidemia     Hypertension     Kidney stone     Lumbar herniated disc 2012    x3    Migraine     Pancreatitis 07/2022    tx at [facility]     Past Surgical History:   Procedure Laterality Date    ANKLE SURGERY      BREAST BIOPSY      BREAST LUMPECTOMY      CHOLECYSTECTOMY      HYSTERECTOMY (CERVIX STATUS UNKNOWN)      LEEP      4 PROCEDURES    TIBIA FRACTURE SURGERY      TONSILLECTOMY      TUBAL LIGATION           SUBJECTIVE:    Precautions:  up with assistance , L Hip ORIF (WBAT on LLE), pain     Social history: Patient lives with spouse in a ranch home  with No steps  to enter  Tub shower      Equipment owned: [address]     [address]   How much difficulty turning over in bed?: Unable  How much difficulty sitting down on / standing up from a chair with arms?: Unable  How much difficulty moving from lying on back to sitting on side of bed?: A Lot  How much help from another person moving to and from a bed to a chair?: Total  How much help from another person needed to walk in hospital room?: Total  How much help from another person for climbing 3-5 steps with a railing?: Total  AM-PAC Inpatient Mobility Raw Score : 7  AM-PAC Inpatient T-Scale Score : 26.42  Mobility Inpatient CMS 0-100% Score: 92.36  Mobility Inpatient CMS G-Code Modifier : CM    Nursing cleared patient for PT evaluation.  The admitting diagnosis and active problem list as listed above have been reviewed prior to the initiation of this evaluation. OBJECTIVE:   Initial Evaluation  Date: 12/17/2022 Treatment Date: Short Term/ Long Term   Goals   Was pt agreeable to Eval/treatment? Yes     Pain Level  8/10   left lower extremity       Bed Mobility  Rolling: attempted but unable to roll completely in either direction due to pain     Supine to sit: Maximal assist of 1    Sit to supine: Maximal assist of  2    Scooting: Maximal assist of 1    Rolling: Independent    Supine to sit: Independent    Sit to supine: Independent    Scooting: Independent     Transfers Sit to stand: Not assessed  due to pain  Sit to stand: Min A   Ambulation    not assessed   25 feet using  wheeled walker with Min A   Stair negotiation: ascended and descended   Not assessed        ROM Within functional limits except Left hip   Increase range of motion 10% of affected joints    Strength Within functional limits  except  Left lower extremity   Within functional limits   Balance Sitting EOB:  fair posterior lean and off loading left hip   Dynamic Standing: na  Sitting EOB:  good    Dynamic Standing:  fair wheeled walker      Patient is Alert & Oriented x person, place, time, and situation and follows directions    Sensation:  Patient reports neuropathy bilateral feet    Edema:  yes left lower extremity   Vitals:  Blood Pressure at rest   Blood Pressure during session     Heart Rate at rest 98 Heart Rate during session    SPO2 at rest 98%  SPO2 during session %     Patient education  Patient educated on role of Physical Therapy, risks of immobility, safety and plan of care,  importance of mobility while in hospital , ankle pumps, quad set and glut set for edema control, blood clot prevention, importance and purpose of adaptive device and adjusted to proper height for the patient. , and safety       Patient response to education:   Pt verbalized understanding Pt demonstrated skill Pt requires further education in this area   Yes Partial Yes Treatment:  Patient practiced and was instructed in the following treatment:     Therapist educated and facilitated patient on techniques to increase safety and independence with bed mobility, balance, functional transfers, and functional mobility. Patient performed ankle pumps, quad sets, glut sets x 15-20 each. Attempted straight leg raise but increased pain and patient refused any more    Assisted to edge of bed,  Sat edge of bed 10 minutes with Supervision  to increase dynamic sitting balance and activity tolerance. With min A for balance. PT requesting back to bed due to increase pain. Assisted back to bed. Instruction and performance of incentive inspirometer. At end of session, patient in bed with alarm call light and phone within reach,  all lines and tubes intact, nursing notified. Patient would benefit from skilled Home Physical Therapy to improve functional independence and quality of life. Patient's/ family goals   home     Patient and or family understand(s) diagnosis, prognosis, and plan of care. Time in  0922  Time out  0955    Total Treatment Time  10 minutes    Evaluation time includes thorough review of current medical information, gathering information on past medical history/social history and prior level of function, completion of standardized testing/informal observation of tasks, assessment of data, and development of Plan of care and goals.      CPT codes:  Low Complexity PT evaluation (48595)  Therapeutic exercises (13360)   10 minutes  1 unit(s)    Kerry Biggs PT

## 2022-12-17 NOTE — OP NOTE
[address]                                OPERATIVE REPORT    PATIENT NAME: Vivek Caro                     :        1975  MED REC NO:   [id number]                            ROOM:       [id number]  ACCOUNT NO:   [de-identified]                           ADMIT DATE: 2022  PROVIDER:     Sixto Arellano    DATE OF PROCEDURE:  2022    PREOPERATIVE DIAGNOSIS:  Closed, displaced intertrochanteric fracture,  left hip. POSTOPERATIVE DIAGNOSIS:  Closed, displaced intertrochanteric fracture,  left hip. OPERATION PERFORMED:  Open reduction and internal fixation of left hip  fracture with interlocking gamma nail. SURGEON:  Sixto Arellano DO    OPERATIVE PROCEDURE:  The patient was brought to the operative theater  where a general anesthetic was induced by Department of Anesthesiology. The patient was placed on the fracture-traction Brinnon table. The left  hip fracture was identified, reduced with some  gentle retraction and  manipulation. Good reduction of the left hip fracture was appreciable  on both the AP and lateral views on portable x-ray machine. Left hip,  leg, and foot were prepped and draped in the usual sterile fashion. Incision was made over the greater trochanter of the hip and brought  back in retrograde fashion. Careful dissection was carried down to the  tip of the greater trochanter. The entry point was made and a pin was  placed in the proximal femur shaft. Satisfactory position of the guide  pin is appreciable with AP and lateral views with a portable x-ray  machine. A 10-mm gamma nail was tapped into place with a 100-mm lag  screw across the fractured femoral neck and head and a 34-mm screw  transversely across the distal tip of the nail.   Good reduction of hip  fracture with good placement of all my hardware is appreciable with AP  and lateral views with a portable x-ray

## 2022-12-18 LAB
ALBUMIN SERPL-MCNC: 2.7 G/DL (ref 3.5–5.2)
ALP BLD-CCNC: 73 U/L (ref 35–104)
ALT SERPL-CCNC: 9 U/L (ref 0–32)
ANION GAP SERPL CALCULATED.3IONS-SCNC: 9 MMOL/L (ref 7–16)
AST SERPL-CCNC: 10 U/L (ref 0–31)
BASOPHILS ABSOLUTE: 0.05 E9/L (ref 0–0.2)
BASOPHILS RELATIVE PERCENT: 0.5 % (ref 0–2)
BILIRUB SERPL-MCNC: 0.2 MG/DL (ref 0–1.2)
BUN BLDV-MCNC: 9 MG/DL (ref 6–20)
CALCIUM SERPL-MCNC: 8 MG/DL (ref 8.6–10.2)
CHLORIDE BLD-SCNC: 103 MMOL/L (ref 98–107)
CO2: 23 MMOL/L (ref 22–29)
CREAT SERPL-MCNC: 0.5 MG/DL (ref 0.5–1)
EOSINOPHILS ABSOLUTE: 0.08 E9/L (ref 0.05–0.5)
EOSINOPHILS RELATIVE PERCENT: 0.8 % (ref 0–6)
GFR SERPL CREATININE-BSD FRML MDRD: >60 ML/MIN/1.73
GLUCOSE BLD-MCNC: 279 MG/DL (ref 74–99)
HBA1C MFR BLD: 13 % (ref 4–5.6)
HCT VFR BLD CALC: 25.8 % (ref 34–48)
HEMOGLOBIN: 8.6 G/DL (ref 11.5–15.5)
IMMATURE GRANULOCYTES #: 0.09 E9/L
IMMATURE GRANULOCYTES %: 0.9 % (ref 0–5)
LYMPHOCYTES ABSOLUTE: 2.68 E9/L (ref 1.5–4)
LYMPHOCYTES RELATIVE PERCENT: 26 % (ref 20–42)
MCH RBC QN AUTO: 30.4 PG (ref 26–35)
MCHC RBC AUTO-ENTMCNC: 33.3 % (ref 32–34.5)
MCV RBC AUTO: 91.2 FL (ref 80–99.9)
METER GLUCOSE: 230 MG/DL (ref 74–99)
METER GLUCOSE: 274 MG/DL (ref 74–99)
METER GLUCOSE: 293 MG/DL (ref 74–99)
METER GLUCOSE: 355 MG/DL (ref 74–99)
MONOCYTES ABSOLUTE: 0.77 E9/L (ref 0.1–0.95)
MONOCYTES RELATIVE PERCENT: 7.5 % (ref 2–12)
NEUTROPHILS ABSOLUTE: 6.63 E9/L (ref 1.8–7.3)
NEUTROPHILS RELATIVE PERCENT: 64.3 % (ref 43–80)
PDW BLD-RTO: 13.9 FL (ref 11.5–15)
PLATELET # BLD: 191 E9/L (ref 130–450)
PMV BLD AUTO: 11.6 FL (ref 7–12)
POTASSIUM REFLEX MAGNESIUM: 3.9 MMOL/L (ref 3.5–5)
RBC # BLD: 2.83 E12/L (ref 3.5–5.5)
SODIUM BLD-SCNC: 135 MMOL/L (ref 132–146)
TOTAL PROTEIN: 5 G/DL (ref 6.4–8.3)
WBC # BLD: 10.3 E9/L (ref 4.5–11.5)

## 2022-12-18 PROCEDURE — 80053 COMPREHEN METABOLIC PANEL: CPT

## 2022-12-18 PROCEDURE — 97535 SELF CARE MNGMENT TRAINING: CPT | Performed by: OCCUPATIONAL THERAPIST

## 2022-12-18 PROCEDURE — 97530 THERAPEUTIC ACTIVITIES: CPT | Performed by: OCCUPATIONAL THERAPIST

## 2022-12-18 PROCEDURE — 97110 THERAPEUTIC EXERCISES: CPT

## 2022-12-18 PROCEDURE — 85025 COMPLETE CBC W/AUTO DIFF WBC: CPT

## 2022-12-18 PROCEDURE — 6370000000 HC RX 637 (ALT 250 FOR IP): Performed by: INTERNAL MEDICINE

## 2022-12-18 PROCEDURE — 97165 OT EVAL LOW COMPLEX 30 MIN: CPT | Performed by: OCCUPATIONAL THERAPIST

## 2022-12-18 PROCEDURE — 6360000002 HC RX W HCPCS: Performed by: ORTHOPAEDIC SURGERY

## 2022-12-18 PROCEDURE — 82962 GLUCOSE BLOOD TEST: CPT

## 2022-12-18 PROCEDURE — 2580000003 HC RX 258: Performed by: ORTHOPAEDIC SURGERY

## 2022-12-18 PROCEDURE — 99232 SBSQ HOSP IP/OBS MODERATE 35: CPT | Performed by: INTERNAL MEDICINE

## 2022-12-18 PROCEDURE — 83036 HEMOGLOBIN GLYCOSYLATED A1C: CPT

## 2022-12-18 PROCEDURE — 2580000003 HC RX 258: Performed by: INTERNAL MEDICINE

## 2022-12-18 PROCEDURE — 1200000000 HC SEMI PRIVATE

## 2022-12-18 PROCEDURE — 36415 COLL VENOUS BLD VENIPUNCTURE: CPT

## 2022-12-18 PROCEDURE — 6370000000 HC RX 637 (ALT 250 FOR IP): Performed by: ORTHOPAEDIC SURGERY

## 2022-12-18 PROCEDURE — 6370000000 HC RX 637 (ALT 250 FOR IP): Performed by: PHYSICAL THERAPY ASSISTANT

## 2022-12-18 PROCEDURE — 6360000002 HC RX W HCPCS: Performed by: INTERNAL MEDICINE

## 2022-12-18 RX ORDER — INSULIN GLARGINE 100 [IU]/ML
30 INJECTION, SOLUTION SUBCUTANEOUS 2 TIMES DAILY
Status: DISCONTINUED | OUTPATIENT
Start: 2022-12-18 | End: 2022-12-19

## 2022-12-18 RX ADMIN — INSULIN GLARGINE 28 UNITS: 100 INJECTION, SOLUTION SUBCUTANEOUS at 08:19

## 2022-12-18 RX ADMIN — METHOCARBAMOL 750 MG: 500 TABLET ORAL at 20:33

## 2022-12-18 RX ADMIN — METHOCARBAMOL 750 MG: 500 TABLET ORAL at 16:39

## 2022-12-18 RX ADMIN — METHOCARBAMOL 750 MG: 500 TABLET ORAL at 08:11

## 2022-12-18 RX ADMIN — MORPHINE SULFATE 2 MG: 2 INJECTION, SOLUTION INTRAMUSCULAR; INTRAVENOUS at 09:12

## 2022-12-18 RX ADMIN — ONDANSETRON 4 MG: 2 INJECTION INTRAMUSCULAR; INTRAVENOUS at 06:05

## 2022-12-18 RX ADMIN — ACETAMINOPHEN 650 MG: 325 TABLET ORAL at 22:53

## 2022-12-18 RX ADMIN — ATORVASTATIN CALCIUM 40 MG: 40 TABLET, FILM COATED ORAL at 20:33

## 2022-12-18 RX ADMIN — SUCRALFATE 1 G: 1 TABLET ORAL at 16:40

## 2022-12-18 RX ADMIN — MORPHINE SULFATE 4 MG: 4 INJECTION, SOLUTION INTRAMUSCULAR; INTRAVENOUS at 01:57

## 2022-12-18 RX ADMIN — ACETAMINOPHEN 650 MG: 325 TABLET ORAL at 11:11

## 2022-12-18 RX ADMIN — ASPIRIN 81 MG 81 MG: 81 TABLET ORAL at 08:11

## 2022-12-18 RX ADMIN — SUCRALFATE 1 G: 1 TABLET ORAL at 06:19

## 2022-12-18 RX ADMIN — DIPHENHYDRAMINE HYDROCHLORIDE 25 MG: 25 TABLET ORAL at 11:11

## 2022-12-18 RX ADMIN — INSULIN LISPRO 4 UNITS: 100 INJECTION, SOLUTION INTRAVENOUS; SUBCUTANEOUS at 20:37

## 2022-12-18 RX ADMIN — DOCUSATE SODIUM 100 MG: 100 CAPSULE, LIQUID FILLED ORAL at 08:11

## 2022-12-18 RX ADMIN — INSULIN LISPRO 4 UNITS: 100 INJECTION, SOLUTION INTRAVENOUS; SUBCUTANEOUS at 08:19

## 2022-12-18 RX ADMIN — INSULIN LISPRO 8 UNITS: 100 INJECTION, SOLUTION INTRAVENOUS; SUBCUTANEOUS at 11:13

## 2022-12-18 RX ADMIN — SODIUM CHLORIDE, PRESERVATIVE FREE 10 ML: 5 INJECTION INTRAVENOUS at 19:47

## 2022-12-18 RX ADMIN — ANASTROZOLE 1 MG: 1 TABLET ORAL at 11:11

## 2022-12-18 RX ADMIN — OXYCODONE 5 MG: 5 TABLET ORAL at 18:25

## 2022-12-18 RX ADMIN — DOXAZOSIN 1 MG: 1 TABLET ORAL at 08:11

## 2022-12-18 RX ADMIN — DIPHENHYDRAMINE HYDROCHLORIDE 25 MG: 25 TABLET ORAL at 19:43

## 2022-12-18 RX ADMIN — PANTOPRAZOLE SODIUM 40 MG: 40 TABLET, DELAYED RELEASE ORAL at 06:20

## 2022-12-18 RX ADMIN — MORPHINE SULFATE 4 MG: 4 INJECTION, SOLUTION INTRAMUSCULAR; INTRAVENOUS at 22:52

## 2022-12-18 RX ADMIN — OXYCODONE 5 MG: 5 TABLET ORAL at 08:11

## 2022-12-18 RX ADMIN — OXYCODONE 5 MG: 5 TABLET ORAL at 14:33

## 2022-12-18 RX ADMIN — SODIUM CHLORIDE 25 MG: 9 INJECTION, SOLUTION INTRAVENOUS at 21:21

## 2022-12-18 RX ADMIN — INSULIN GLARGINE 30 UNITS: 100 INJECTION, SOLUTION SUBCUTANEOUS at 20:34

## 2022-12-18 RX ADMIN — MORPHINE SULFATE 4 MG: 4 INJECTION, SOLUTION INTRAMUSCULAR; INTRAVENOUS at 16:01

## 2022-12-18 RX ADMIN — ACETAMINOPHEN 650 MG: 325 TABLET ORAL at 16:40

## 2022-12-18 RX ADMIN — ACETAMINOPHEN 650 MG: 325 TABLET ORAL at 00:28

## 2022-12-18 RX ADMIN — MORPHINE SULFATE 4 MG: 4 INJECTION, SOLUTION INTRAMUSCULAR; INTRAVENOUS at 19:43

## 2022-12-18 RX ADMIN — MORPHINE SULFATE 4 MG: 4 INJECTION, SOLUTION INTRAMUSCULAR; INTRAVENOUS at 12:34

## 2022-12-18 RX ADMIN — INSULIN LISPRO 2 UNITS: 100 INJECTION, SOLUTION INTRAVENOUS; SUBCUTANEOUS at 16:39

## 2022-12-18 RX ADMIN — SUCRALFATE 1 G: 1 TABLET ORAL at 20:33

## 2022-12-18 RX ADMIN — POLYETHYLENE GLYCOL 3350 17 G: 17 POWDER, FOR SOLUTION ORAL at 08:12

## 2022-12-18 RX ADMIN — ACETAMINOPHEN 650 MG: 325 TABLET ORAL at 05:40

## 2022-12-18 RX ADMIN — SUCRALFATE 1 G: 1 TABLET ORAL at 11:11

## 2022-12-18 RX ADMIN — DOCUSATE SODIUM 100 MG: 100 CAPSULE, LIQUID FILLED ORAL at 20:33

## 2022-12-18 RX ADMIN — ASPIRIN 81 MG 81 MG: 81 TABLET ORAL at 20:33

## 2022-12-18 RX ADMIN — METHOCARBAMOL 750 MG: 500 TABLET ORAL at 11:11

## 2022-12-18 RX ADMIN — SODIUM CHLORIDE, PRESERVATIVE FREE 10 ML: 5 INJECTION INTRAVENOUS at 08:11

## 2022-12-18 ASSESSMENT — PAIN SCALES - GENERAL
PAINLEVEL_OUTOF10: 9
PAINLEVEL_OUTOF10: 4
PAINLEVEL_OUTOF10: 6
PAINLEVEL_OUTOF10: 9
PAINLEVEL_OUTOF10: 7
PAINLEVEL_OUTOF10: 3
PAINLEVEL_OUTOF10: 10
PAINLEVEL_OUTOF10: 5
PAINLEVEL_OUTOF10: 5
PAINLEVEL_OUTOF10: 2
PAINLEVEL_OUTOF10: 10
PAINLEVEL_OUTOF10: 2
PAINLEVEL_OUTOF10: 2
PAINLEVEL_OUTOF10: 4
PAINLEVEL_OUTOF10: 4

## 2022-12-18 ASSESSMENT — PAIN DESCRIPTION - ORIENTATION
ORIENTATION: LEFT
ORIENTATION: RIGHT
ORIENTATION: LEFT
ORIENTATION: LEFT

## 2022-12-18 ASSESSMENT — PAIN DESCRIPTION - LOCATION
LOCATION: LEG
LOCATION: HIP

## 2022-12-18 ASSESSMENT — PAIN DESCRIPTION - DESCRIPTORS
DESCRIPTORS: ACHING;JABBING;DISCOMFORT
DESCRIPTORS: ACHING
DESCRIPTORS: ACHING;TENDER;SORE
DESCRIPTORS: ACHING;TENDER;SORE

## 2022-12-18 ASSESSMENT — PAIN - FUNCTIONAL ASSESSMENT: PAIN_FUNCTIONAL_ASSESSMENT: PREVENTS OR INTERFERES WITH MANY ACTIVE NOT PASSIVE ACTIVITIES

## 2022-12-18 ASSESSMENT — PAIN DESCRIPTION - PAIN TYPE: TYPE: SURGICAL PAIN

## 2022-12-18 NOTE — PLAN OF CARE
Problem: Chronic Conditions and Co-morbidities  Goal: Patient's chronic conditions and co-morbidity symptoms are monitored and maintained or improved  Outcome: Progressing     Problem: Discharge Planning  Goal: Discharge to home or other facility with appropriate resources  Outcome: Progressing     Problem: Pain  Goal: Verbalizes/displays adequate comfort level or baseline comfort level  Outcome: Progressing     Problem: Safety - Adult  Goal: Free from fall injury  Outcome: Progressing     Problem: ABCDS Injury Assessment  Goal: Absence of physical injury  Outcome: Progressing     Problem: Nutrition Deficit:  Goal: Optimize nutritional status  Outcome: Progressing     Problem: Musculoskeletal - Adult  Goal: Maintain proper alignment of affected body part  Outcome: Progressing

## 2022-12-18 NOTE — PROGRESS NOTES
Physical Therapy Treatment Note/Plan of Care    Room #:  7999/9049-11  Patient Name: Ronal Chand  YOB: 1975  MRN: [id number]    Date of Service: 12/18/2022     Tentative placement recommendation: Subacute rehab  Equipment recommendation: To be determined      Evaluating Physical Therapist: Alejandro Wen #[id number]     Specific Provider Orders/Date/Referring Provider :   12/16/22 1415    PT eval and treat  Start:  12/16/22 1415,   End:  12/16/22 1415,   ONE TIME,   Standing Count:  1 Occurrences,   R        Last continued at transfer on Fri Dec 16, 2022  8:30 PM    Jose Carlos Roldan DO     Admitting Diagnosis:   Hyperglycemia [R73.9]  Closed displaced intertrochanteric fracture of left femur, initial encounter [facility]) [S72.142A]  Closed intertrochanteric fracture of hip, left, initial encounter [facility]) [S72.142A]  Intertrochanteric fracture of left hip, closed, initial encounter ([facility] [address] [S72.142A]   Visit diagnosis:     DATE OF PROCEDURE:  12/16/2022    PREOPERATIVE DIAGNOSIS:  Closed, displaced intertrochanteric fracture,    left hip. POSTOPERATIVE DIAGNOSIS:  Closed, displaced intertrochanteric fracture,left hip. OPERATION PERFORMED:  Open reduction and internal fixation of left hip fracture with interlocking gamma nail.     SURGEON:  Jami No DO      Visit Diagnoses         Codes    Hyperglycemia    -  Primary R73.9    Closed intertrochanteric fracture of hip, left, initial encounter [facility])     S72.142A    Closed displaced intertrochanteric fracture of left femur, initial encounter [facility])     S72.142A            Patient Active Problem List   Diagnosis    Migraines, neuralgic    Hyperlipidemia with target LDL less than 70    Tobacco abuse    GERD (gastroesophageal reflux disease)    Malignant tumor of breast ([address]    Malignant neoplasm of lower-outer quadrant of right female breast ([address]    Status post chemoradiation    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 FWB on LLE. L Hip ORIF. Patient was limited in therapy today due to pain. Patient would only allow very slow and small range of motion. The patient will benefit from continued skilled therapy to increase strength and improve balance for safe functional mobility, to decrease risk of falls, and to meet goals at discharge. PHYSICAL THERAPY  PLAN OF CARE       Physical therapy plan of care is established based on physician order,  patient diagnosis and clinical assessment    Current Treatment Recommendations:    -Bed Mobility: Lower extremity exercises , Upper extremity exercises , and Trunk control activities   -Sitting Balance: Incorporate reaching activities to activate trunk muscles , Hands on support to maintain midline , and Facilitate active trunk muscle engagement   -Standing Balance: Perform strengthening exercises in standing to promote motor control with or without upper extremity support   -Transfers: Provide instruction on proper hand and foot position for adequate transfer of weight onto lower extremities and use of gait device if needed and Cues for hand placement, technique and safety. Provide stabilization to prevent fall   -Gait: Gait training and Standing activities to improve: base of support, weight shift, weight bearing      PT long term treatment goals are located in below grid    Patient and or family understand(s) diagnosis, prognosis, and plan of care. Frequency of treatments: Patient will be seen  twice daily  for therapeutic exercise, functional retraining, endurance activities, balance exercises, family and patient education.        Prior Level of Function: Patient ambulated independently   Rehab Potential: good for baseline     Past medical history:   Past Medical History:   Diagnosis Date    BRCA1 gene mutation positive     Patient put herself and it was postive per patient    Breast cancer ([facility] [address]     Cancer ([facility] [address]     Breast    Diabetes mellitus ([facility] [address]     Headache(784.0)     migraines    Hyperlipidemia     Hypertension     Kidney stone     Lumbar herniated disc 2012    x3    Migraine     Pancreatitis 07/2022    tx at [facility]     Past Surgical History:   Procedure Laterality Date    ANKLE SURGERY      BREAST BIOPSY      BREAST LUMPECTOMY      CHOLECYSTECTOMY      HYSTERECTOMY (CERVIX STATUS UNKNOWN)      LEEP      4 PROCEDURES    TIBIA FRACTURE SURGERY      TONSILLECTOMY      TUBAL LIGATION           SUBJECTIVE:    Precautions:  up with assistance , L Hip ORIF (WBAT on LLE), pain     Social history: Patient lives with spouse in a ranch home  with No steps  to enter  Tub shower      Equipment owned: [address] [facility] [employer] [facility]     [address]   How much difficulty turning over in bed?: A Lot  How much difficulty sitting down on / standing up from a chair with arms?: A Lot  How much difficulty moving from lying on back to sitting on side of bed?: A Lot  How much help from another person moving to and from a bed to a chair?: A Lot  How much help from another person needed to walk in hospital room?: Total  How much help from another person for climbing 3-5 steps with a railing?: Total  AM-PAC Inpatient Mobility Raw Score : 10  AM-PAC Inpatient T-Scale Score : 32.29  Mobility Inpatient CMS 0-100% Score: 76.75  Mobility Inpatient CMS G-Code Modifier : CL    Nursing cleared patient for PT treatment. OBJECTIVE:   Initial Evaluation  Date: 12/17/2022 Treatment Date:  12/18/2022   Short Term/ Long Term   Goals   Was pt agreeable to Eval/treatment?  Yes Yes     Pain Level  8/10   left lower extremity  10/10     Bed Mobility  Rolling: attempted but unable to roll completely in either direction due to pain     Supine to sit: Maximal assist of 1    Sit to supine: Maximal assist of  2    Scooting: Maximal assist of 1   Rolling: Not assessed    Supine to sit: Not assessed    Sit to supine: Not assessed    Scooting: Not assessed     Rolling: Independent    Supine to sit:  Independent    Sit to supine: Independent    Scooting: Independent     Transfers Sit to stand: Not assessed  due to pain Sit to stand: Not assessed     Sit to stand: Min A   Ambulation    not assessed  Not assessed at this time   25 feet using  wheeled walker with Min A   Stair negotiation: ascended and descended   Not assessed        ROM Within functional limits except Left hip   Increase range of motion 10% of affected joints    Strength Within functional limits  except  Left lower extremity   Within functional limits   Balance Sitting EOB:  fair posterior lean and off loading left hip   Dynamic Standing: na Sitting EOB: not assessed   Dynamic Standing: not assessed    Sitting EOB:  good    Dynamic Standing:  fair wheeled walker      Patient is Alert & Oriented x person, place, time, and situation and follows directions    Sensation:  Patient reports neuropathy bilateral feet    Edema:  yes left lower extremity   Vitals:  Blood Pressure at rest   Blood Pressure during session     Heart Rate at rest  Heart Rate during session    SPO2 at rest %  SPO2 during session %     Patient education  Patient educated on role of Physical Therapy, risks of immobility, safety and plan of care,  importance of mobility while in hospital , ankle pumps, quad set and glut set for edema control, blood clot prevention, and safety       Patient response to education:   Pt verbalized understanding Pt demonstrated skill Pt requires further education in this area   Yes Partial Yes       Treatment:  Patient practiced and was instructed in the following treatment:        Performed supine exercise  increased pain with movement x 5-8 reps      At end of session, patient in bed with alarm call light and phone within reach,  all lines and tubes intact, nursing notified. Patient would benefit from skilled Home Physical Therapy to improve functional independence and quality of life. Patient's/ family goals   home     Patient and or family understand(s) diagnosis, prognosis, and plan of care.       Time in  340  Time out  348    Total Treatment Time  8 minutes      CPT codes:    Therapeutic exercises (79288)   8 minutes  1 unit(s)    Tresa Kimble PTA [id number]#[id number]

## 2022-12-18 NOTE — PLAN OF CARE
Problem: Chronic Conditions and Co-morbidities  Goal: Patient's chronic conditions and co-morbidity symptoms are monitored and maintained or improved  12/18/2022 1210 by Gela Villalobos RN  Outcome: Progressing  12/18/2022 0004 by Shelby Bowman RN  Outcome: Progressing     Problem: Discharge Planning  Goal: Discharge to home or other facility with appropriate resources  12/18/2022 1210 by Gela Villalobos RN  Outcome: Progressing  12/18/2022 0004 by Shelby Bowman RN  Outcome: Progressing     Problem: Pain  Goal: Verbalizes/displays adequate comfort level or baseline comfort level  12/18/2022 1210 by Gela Villalobos RN  Outcome: Progressing  12/18/2022 0004 by Shelby Bowman RN  Outcome: Progressing     Problem: Safety - Adult  Goal: Free from fall injury  12/18/2022 1210 by Gela Villalobos RN  Outcome: Progressing  12/18/2022 0004 by Shelby Bowman RN  Outcome: Progressing     Problem: ABCDS Injury Assessment  Goal: Absence of physical injury  12/18/2022 1210 by Gela Villalobos RN  Outcome: Progressing  12/18/2022 0004 by Shelby Bowman RN  Outcome: Progressing

## 2022-12-18 NOTE — PROGRESS NOTES
[address] [place], [place] [address]   Progress Note    Admitting Date and Time: 12/16/2022 11:50 AM  Admit Dx: Hyperglycemia [R73.9]  Closed displaced intertrochanteric fracture of left femur, initial encounter ([facility] [address] [S72.142A]  Closed intertrochanteric fracture of hip, left, initial encounter ([facility] [address] Rodrick Page  Intertrochanteric fracture of left hip, closed, initial encounter ([facility] [address] Rodrick Page    Subjective:    12/17: Pt states pain is not controlled and states had not been less than 9/10 today. Per RN, worked with therapy but only able to get to edge of bed.    12/18: Patient states pain is somewhat better controlled alternating morphine with Percocet. She did get up with therapy today was able to transfer to chair.  at bedside    Per RN: Hgb trending down.        insulin lispro  0-8 Units SubCUTAneous 4x Daily AC & HS    acetaminophen  650 mg Oral 4 times per day    Or    acetaminophen  650 mg Rectal 4 times per day    methocarbamol  750 mg Oral 4x Daily    aspirin  81 mg Oral BID    docusate sodium  100 mg Oral BID    polyethylene glycol  17 g Oral Daily    anastrozole  1 mg Oral Daily    doxazosin  1 mg Oral Daily    pantoprazole  40 mg Oral QAM AC    sucralfate  1 g Oral 4x Daily AC & HS    atorvastatin  40 mg Oral Nightly    sodium chloride flush  5-40 mL IntraVENous 2 times per day    insulin glargine  28 Units SubCUTAneous BID     morphine, 2 mg, Q3H PRN   Or  morphine, 4 mg, Q3H PRN  oxyCODONE, 5 mg, Q4H PRN  sodium chloride flush, 5-40 mL, PRN  sodium chloride, , PRN  ondansetron, 4 mg, Q8H PRN   Or  ondansetron, 4 mg, Q6H PRN  polyethylene glycol, 17 g, Daily PRN  glucose, 4 tablet, PRN  dextrose bolus, 125 mL, PRN   Or  dextrose bolus, 250 mL, PRN  glucagon (rDNA), 1 mg, PRN  dextrose, , Continuous PRN  HYDROmorphone, 0.5 mg, Q5 Min PRN  diphenhydrAMINE, 25 mg, Q8H PRN       Objective:    BP (!) 99/57   Pulse (!) 101   Temp 97.7 °F (36.5 °C) (Oral)   Resp 18   Ht 5' 7\" (1.702 m)   Wt 170 lb (77.1 kg)   LMP 08/16/2011   SpO2 97%   BMI 26.63 kg/m²   General Appearance: alert and oriented to person, place and time and in no acute distress  Skin: warm and dry  Head: normocephalic and atraumatic  Eyes: pupils equal, round, and reactive to light, extraocular eye movements intact, conjunctivae normal  Neck: neck supple and non tender without mass   Pulmonary/Chest: clear to auscultation bilaterally- no wheezes, rales or rhonchi, normal air movement, no respiratory distress  Cardiovascular: normal rate, normal S1 and S2 and no carotid bruits  Abdomen: soft, non-tender, non-distended, normal bowel sounds, no masses or organomegaly  Extremities: left thigh bandage in place. Neurologic: no cranial nerve deficit and speech normal      Recent Labs     12/16/22  1226 12/18/22  0757    135   K 4.7 3.9    103   CO2 21* 23   BUN 10 9   CREATININE 0.5 0.5   GLUCOSE 471* 279*   CALCIUM 9.2 8.0*         Recent Labs     12/18/22  0757   ALKPHOS 73   PROT 5.0*   LABALBU 2.7*   BILITOT 0.2   AST 10   ALT 9       Recent Labs     12/16/22  1226 12/16/22  1902 12/18/22  0757   WBC 13.4*  --  10.3   RBC 5.12  --  2.83*   HGB 15.5 11.8 8.6*   HCT 44.9 36.6 25.8*   MCV 87.7  --  91.2   MCH 30.3  --  30.4   MCHC 34.5  --  33.3   RDW 13.6  --  13.9     --  191   MPV 11.5  --  11.6         Hemoglobin A1c [[id number]] (Abnormal) Collected: 12/18/22 0757     Specimen: Blood Updated: 12/18/22 1332      Hemoglobin A1C 13.0 %        Radiology:   Fluoro For Surgical Procedures   Final Result   Intraprocedural fluoroscopic spot images as above. See separate procedure   report for more information. XR HIP LEFT (2-3 VIEWS)   Final Result   Mildly displaced and comminuted intertrochanteric fracture at the left femur. XR FEMUR LEFT (MIN 2 VIEWS)   Final Result   Mildly displaced and comminuted intertrochanteric fracture at the left femur.          CT HEAD WO CONTRAST   Final Result   No acute intracranial abnormality. No acute osseous abnormality of the cervical spine. Degenerative changes of the cervical spine. CT CSpine W/O Contrast   Final Result   No acute intracranial abnormality. No acute osseous abnormality of the cervical spine. Degenerative changes of the cervical spine. Assessment:  Principal Problem:    Intertrochanteric fracture of left hip, closed, initial encounter [facility])  Resolved Problems:    * No resolved hospital problems. *      Plan:                 Intertrochanteric fracture left hip s/p mechani  -EKG nonischemic, patient with a physical job and doing active household chores with METS level of 4. Furthermore, RCRI score of 1 due to insulin use. Medically stable for planned surgery. -POD #2 ORIF with interlocking gamma nail  -Pain control as per orthopedics but yesterday increased morphine frequency to q3 and added Percocet pain panel.   -Started bowel regimen with Miralax daily and colace 100 mg bid  -SCDs for DVT prophylaxis and transition to aspirin as per orthopedics     2. Postop anemia  -EBL was about 700 ml per my discussion with ortho residents night of surgery  -Hgb trend 15.5-->11.8-->8. 6. Continue to monitor hemoglobin  -We will place patient on IV iron today and tomorrow to help build up her iron stores given acute blood loss  -Transfuse if hemoglobin less than 7    3. Uncontrolled type II DM on long term insulin  -Placed on home basal insulin equivalent. Patient has been on glargine 28 units twice daily since the evening of surgery but blood sugar are not controlled over 250 consistently. Increase glargine to 30 units twice daily  -holding metformin but will placed on medium dose SSI  -Checked A1c and this came back at 13.0% indicating very poor control. Last one on file was 11.4% in June 2022. 3.         HTN/HLD  -Continue doxazosin but hold lisinopril until we ensure creatinine is stable post surgery.   Creatinine stable at 0.5 but blood pressure remained soft so we will hold off on resuming lisinopril at this point in time.  -Continue atorvastatin     4. GERD  -Continue omeprazole and Carafate     5. Disposition  -Patient not stable for discharge as she is  requiring IV pain medication and has down-trending hemoglobin. May need transfusion in the near future. However, if hemoglobin stabilizes and pain controlled with oral medications, anticipate discharge in next 24-72 hours. -PT/OT recommending SHREYA but she is self pay. She will be screened by public benefits tomorrow to see if she is Medicaid eligible. If not, she will likely need to discharge to home, when medically stable    Case discussed with  at bedside. NOTE: This report was transcribed using voice recognition software. Every effort was made to ensure accuracy; however, inadvertent computerized transcription errors may be present.      Electronically signed by Hermilo Meléndez MD on 12/18/2022 at 5:11 PM

## 2022-12-18 NOTE — PLAN OF CARE
Problem: Chronic Conditions and Co-morbidities  Goal: Patient's chronic conditions and co-morbidity symptoms are monitored and maintained or improved  12/18/2022 1211 by Katharine Best RN  Outcome: Progressing  12/18/2022 1210 by Katharine Best RN  Outcome: Progressing  12/18/2022 0004 by Jose Alfredo Wang RN  Outcome: Progressing     Problem: Discharge Planning  Goal: Discharge to home or other facility with appropriate resources  12/18/2022 1211 by Katharine Best RN  Outcome: Progressing  12/18/2022 1210 by Katharine Best RN  Outcome: Progressing  12/18/2022 0004 by Jose Alfredo Wang RN  Outcome: Progressing     Problem: Pain  Goal: Verbalizes/displays adequate comfort level or baseline comfort level  12/18/2022 1211 by Katharine Best RN  Outcome: Progressing  12/18/2022 1210 by Katharine Best RN  Outcome: Progressing  12/18/2022 0004 by Jose Alfredo Wang RN  Outcome: Progressing     Problem: Safety - Adult  Goal: Free from fall injury  12/18/2022 1211 by Katharine Best RN  Outcome: Progressing  12/18/2022 1210 by Katharine Best RN  Outcome: Progressing  12/18/2022 0004 by Jose Alfredo Wang RN  Outcome: Progressing

## 2022-12-18 NOTE — PROGRESS NOTES
[address] [facility]  [facility]         Date:2022                                                   Patient Name: Jaylyn Macias     MRN: [id number]     : 1975     Room: [facility]0323-02       Evaluating OT: CHAPIS Echeverria/SANTOS; [id number]       Referring Provider and Orders/Date:    OT eval and treat  Start:  22 1415,   End:  22 1415,   ONE TIME,   Standing Count:  1 Occurrences,   R        Last continued at transfer on Fri Dec 16, 2022  8:30 PM    Lio Ribeiro DO        Diagnosis:   1. Hyperglycemia    2. Closed intertrochanteric fracture of hip, left, initial encounter ([facility] [address]    3. Closed displaced intertrochanteric fracture of left femur, initial encounter ([facility] [address]    4.  Intertrochanteric fracture of left hip, closed, initial encounter [facility])         Surgery: S/P L hip ORIF for IT fx - 22      Pertinent Medical History:       Past Medical History:   Diagnosis Date    BRCA1 gene mutation positive     Patient put herself and it was postive per patient    Breast cancer ([facility] [address]     Cancer ([facility] [address]     Breast    Diabetes mellitus ([facility] [address]     Headache(784.0)     migraines    Hyperlipidemia     Hypertension     Kidney stone     Lumbar herniated disc 2012    x3    Migraine     Pancreatitis 2022    tx at [facility]          Past Surgical History:   Procedure Laterality Date    ANKLE SURGERY      BREAST BIOPSY      BREAST LUMPECTOMY      CHOLECYSTECTOMY      HYSTERECTOMY (CERVIX STATUS UNKNOWN)      LEEP      4 PROCEDURES    TIBIA FRACTURE SURGERY      TONSILLECTOMY      TUBAL LIGATION         Precautions:  Fall Risk, WBAT L solitario KAMARA    Recommended placement: subacute    Assessment of current deficits     [x] Functional mobility  [x]ADLs  [x] Strength               []Cognition     [x] Functional transfers   [x] IADLs         [x] Safety Awareness   [x]Endurance     [] Fine Coordination [x] Balance      [] Vision/perception   []Sensation      [x]Gross Motor Coordination  [] ROM  [] Delirium                   [] Motor Control     OT PLAN OF CARE   OT POC based on physician orders, patient diagnosis and results of clinical assessment    Frequency/Duration 1-3 days/wk for 2 weeks PRN   Specific OT Treatment Interventions to include:   * Instruction/training on adapted ADL techniques and AE recommendations to increase functional independence within precautions       * Training on energy conservation strategies, correct breathing pattern and techniques to improve independence/tolerance for self-care routine  * Functional transfer/mobility training/DME recommendations for increased independence, safety, and fall prevention  * Patient/Family education to increase follow through with safety techniques and functional independence  * Recommendation of environmental modifications for increased safety with functional transfers/mobility and ADLs  * Therapeutic exercise to improve motor endurance, ROM, and functional strength for ADLs/functional transfers  * Therapeutic activities to facilitate/challenge dynamic balance, stand tolerance for increased safety and independence with ADLs  * Therapeutic activities to facilitate gross/fine motor skills for increased independence with ADLs  * Neuro-muscular re-education: facilitation of righting/equilibrium reactions, midline orientation, scapular stability/mobility, normalization of muscle tone, and facilitation of volitional active controled movement  * Positioning to improve skin integrity, interaction with environment and functional independence     Recommended Adaptive Equipment/DME: AE; TBD      Home Living: Pt lives with spouse in a 1 story home with no steps to enter. Spouse is on disability. Laundry on first floor. Can have additional social support if needed.      Bathroom setup: tub shower and shower chair; standard toilet    DME owned: none Prior Level of Function: indep with ADLs, indep with IADLs; ambulated indep   Driving: yes   Occupation: door dash   Enjoys: reading, playing video games, getting a new dog    Pain Level: left hip 7/10 from supine, 10/10 to sit EOB and 8/10 in standing; pt had pain medications about 2 hours  Cognition: A&O: 4/4; Follows 3 step directions   Memory:  Intact   Sequencing:  Intact   Problem solving:  Intact   Judgement/safety:  Intact    [facility] Daily Activity Inpatient   How much help for putting on and taking off regular lower body clothing?: Total  How much help for Bathing?: A Lot  How much help for Toileting?: Total  How much help for putting on and taking off regular upper body clothing?: A Lot  How much help for taking care of personal grooming?: A Little  How much help for eating meals?: None  [facility] Inpatient Daily Activity Raw Score: 13  AM-[facility] Inpatient ADL T-Scale Score : 32.03  ADL Inpatient CMS 0-100% Score: 63.03  ADL Inpatient CMS G-Code Modifier : CL    Functional Assessment:     Initial Eval Status  Date: 12/18/2022   Treatment Status  Date: STGs = LTGs  Time frame: 10-14 days   Feeding Independent  NA-PLOF   Grooming Stand by Assist and set up required from supine level  Independent    UB Dressing Maximal Assist with gown management from supine and sitting EOB  Indep   LB Dressing Dependent with socks from supine and sitting  Minimal Assist    Bathing Maximal Assist due to pain from supine level. Assist for LB and buttocks. Pt was able to wash top of thighs. Minimal Assist    Toileting Dependent with jerez; Recommending 3:1  Stand by Assist    Bed Mobility  Supine to sit: Moderate Assist   Sit to supine: Maximal Assist  For LB assist due to pain   Supine to sit: Stand by Assist   Sit to supine: Stand by Assist    Functional Transfers Moderate Assist with walker from elevated surface of bed.  Not able to test chair or 3:1 due to pain reported by patient  Stand by Assist    Functional Mobility Minimal Assist with walker for side stepping to [facility]   Stand by Assist    Balance Sitting:     Static:  fair    Dynamic:fair-  Standing: poor+  Sitting:     Static:  good    Dynamic:good  Standing: fair   Activity Tolerance Vitals with activity:room air with 98% Hr  with session; sitting EOB for 7min average and standing for 1min    Increase standing tolerance for >5min with stable vital signs for carry over into toileting, functional tranfers and indep in ADLs   Visual/  Perceptual Glasses: reading-Present; WFL    Reports change in vision since admission: No     NA     Hand Dominance  [x] Right  [] Left    AROM (PROM) Strength Additional Info:  Goal:   RUE  WFL 4/5 good  and  FMC/dexterity noted during ADL tasks  Opposition [x] Intact [] Impaired  Finger to nose [x] Intact [] Impaired 5/5MMT generally for carry over into self care, functional transfers and functional mobility with AD. LUE WFL-rotator cuff PTA 4-/5 good  and  FMC/dexterity noted during ADL tasks  Opposition [x] Intact [] Impaired  Finger to nose [x] Intact [] Impaired 4+/5MMT generally for carry over into self care, functional transfers and functional mobility with AD. [facility]   Sensation: c/o numbness or tingling-neuropathy bilateral feet and finger tips    Tone: WFL   Edema: L LE    Comments: Upon arrival patient supine in bed with pain. Spouse present towards the end of the session. Pt required max A for most UB ADLs and dep A LB ADLs tasks. Limited with mod A for standing during LB ADLs and functional transfers. The biggest barriers reflect that of functional transfers, functional mobility, UB/LB ADLs, pain, activity tolerance, balance, safety and strengthening. At end of session, patient supine with call light and phone within reach, all lines and tubes intact. Overall patient demonstrated decreased independence and safety during completion of ADL/functional transfer/mobility tasks compared to PLOF.  Nursing updated on pt position and status following OT eval. Pt would benefit from continued skilled OT to increase safety and independence with completion of ADL/IADL tasks for functional independence and quality of life. Treatment: OT treatment provided this date includes:  Instruction, education and training on safe facilitation and adapted techniques for completion of ADLs. These include neuromuscular reeducation to facilitate balance/righting reactions,safe functional transfer techniques, proper positioning/alignment to improve interaction with environment and overall function and on adapted techniques/work simplification for completion of ADLs. Education provided on hand/feet placement with bed rails, walker and body mechanics for fall prevention. Extended time for motivation and due to pain management. Pt required encouragement throughout. Cues for energy conservation and safety for in the home at DC, including modifications and DME. Extended time to complete all tasks, including skilled monitoring of patient's response during treatment session and vital signs. Prior to and at the end of session, environmental modifications / line management completed for patients safety and efficiency of treatment session. See above for further details. Rehab Potential: Good for established goals     Patient / Family Goal: Pain management      Patient and/or family were instructed on functional diagnosis, prognosis/goals and OT plan of care. Demonstrated good understanding. Eval Complexity: Low  History: Brief review of medical records and additional review of physical, cognitive, or psychosocial history related to current functional performance  Exam: 3+ performance deficits  Assistance/Modification: Mod assistance or modifications required to perform tasks. May have comorbidities that affect occupational performance.     Time In: 1332  Time Out: 1435  Total Treatment Time: 43    Min Units   OT Eval Low 97165  x  1   OT Eval Medium [address]      OT Re-Eval [id number]       Therapeutic Ex [id number]       Therapeutic Activities [id number]  26 2    ADL/Self Care 84249  17 1    Orthotic Management 64749       Manual 19805     Neuro Re-Ed 81015       Non-Billable Time          Evaluation Time additionally includes thorough review of current medical information, gathering information on past medical history/social history and prior level of function, interpretation of standardized testing/informal observation of tasks, assessment of data and development of plan of care and goals.             Shaina Lei OTR/L; [id number]

## 2022-12-18 NOTE — PROGRESS NOTES
Department of Orthopedic Surgery  Resident Progress Note    POD 2 s/p L hip ORIF. Patient seen and examined. States that pain is controlled as long as administered as scheduled. Unable to do PT yesterday due to pain. No new complaints. Denies chest pain, shortness of breath, dizziness/lightheadedness. VITALS:  BP (!) 99/57   Pulse (!) 101   Temp 97.7 °F (36.5 °C) (Oral)   Resp 18   Ht 5' 7\" (1.702 m)   Wt 170 lb (77.1 kg)   LMP 08/16/2011   SpO2 97%   BMI 26.63 kg/m²     General: in no acute distress and alert    MUSCULOSKELETAL:   left lower extremity:  Dressing C/D/I, some strike through noted  Compartments soft and compressible  Calf is soft and nontender  +PF/DF/EHL  Brisk Cap refill, Toes warm and perfused  Distal sensation grossly intact to Peroneals, Sural, Saphenous, and tibial nrs    CBC:   Lab Results   Component Value Date/Time    WBC 13.4 12/16/2022 12:26 PM    HGB 11.8 12/16/2022 07:02 PM    HCT 36.6 12/16/2022 07:02 PM     12/16/2022 12:26 PM     PT/INR:  No results found for: PROTIME, INR    ASSESSMENT  S/P L hip ORIF for IT fx - 12/16/22    PLAN      Continue physical therapy and protocol: WBAT - L LE  Continue Deep venous thrombosis prophylaxis - asa 81 mg BID, early mobilization  PT/OT today. Encouraged patient to undergo  Pain Control: IV and PO, norco discontinued, percocet added. Tylenol scheduled q6h. Continue Robaxin   Continue to Monitor H&H 11.8 on 12/16  D/C Plan:  pending sw/cm and therapy recommendations.  Ok to discharge from orthopedic standpoint once appropriate discharge plan is in place    Electronically signed by Martha Bansal DO on 12/18/2022 at 8:08 AM

## 2022-12-19 PROBLEM — E11.65 TYPE 2 DIABETES MELLITUS WITH HYPERGLYCEMIA, WITH LONG-TERM CURRENT USE OF INSULIN (HCC): Status: ACTIVE | Noted: 2020-03-06

## 2022-12-19 PROBLEM — Z79.4 TYPE 2 DIABETES MELLITUS WITH HYPERGLYCEMIA, WITH LONG-TERM CURRENT USE OF INSULIN (HCC): Status: ACTIVE | Noted: 2020-03-06

## 2022-12-19 PROBLEM — D64.9 POSTOPERATIVE ANEMIA: Status: ACTIVE | Noted: 2022-12-19

## 2022-12-19 LAB
ANION GAP SERPL CALCULATED.3IONS-SCNC: 7 MMOL/L (ref 7–16)
BASOPHILS ABSOLUTE: 0.04 E9/L (ref 0–0.2)
BASOPHILS RELATIVE PERCENT: 0.5 % (ref 0–2)
BUN BLDV-MCNC: 7 MG/DL (ref 6–20)
CALCIUM SERPL-MCNC: 8.3 MG/DL (ref 8.6–10.2)
CHLORIDE BLD-SCNC: 103 MMOL/L (ref 98–107)
CO2: 27 MMOL/L (ref 22–29)
CREAT SERPL-MCNC: 0.5 MG/DL (ref 0.5–1)
EOSINOPHILS ABSOLUTE: 0.14 E9/L (ref 0.05–0.5)
EOSINOPHILS RELATIVE PERCENT: 1.8 % (ref 0–6)
GFR SERPL CREATININE-BSD FRML MDRD: >60 ML/MIN/1.73
GLUCOSE BLD-MCNC: 230 MG/DL (ref 74–99)
HCT VFR BLD CALC: 25.3 % (ref 34–48)
HEMOGLOBIN: 8.3 G/DL (ref 11.5–15.5)
IMMATURE GRANULOCYTES #: 0.07 E9/L
IMMATURE GRANULOCYTES %: 0.9 % (ref 0–5)
LYMPHOCYTES ABSOLUTE: 2.6 E9/L (ref 1.5–4)
LYMPHOCYTES RELATIVE PERCENT: 32.5 % (ref 20–42)
MCH RBC QN AUTO: 29.9 PG (ref 26–35)
MCHC RBC AUTO-ENTMCNC: 32.8 % (ref 32–34.5)
MCV RBC AUTO: 91 FL (ref 80–99.9)
METER GLUCOSE: 246 MG/DL (ref 74–99)
METER GLUCOSE: 278 MG/DL (ref 74–99)
METER GLUCOSE: 370 MG/DL (ref 74–99)
METER GLUCOSE: 380 MG/DL (ref 74–99)
METER GLUCOSE: 381 MG/DL (ref 74–99)
MONOCYTES ABSOLUTE: 0.59 E9/L (ref 0.1–0.95)
MONOCYTES RELATIVE PERCENT: 7.4 % (ref 2–12)
NEUTROPHILS ABSOLUTE: 4.56 E9/L (ref 1.8–7.3)
NEUTROPHILS RELATIVE PERCENT: 56.9 % (ref 43–80)
PDW BLD-RTO: 13.8 FL (ref 11.5–15)
PLATELET # BLD: 206 E9/L (ref 130–450)
PMV BLD AUTO: 11.8 FL (ref 7–12)
POTASSIUM SERPL-SCNC: 3.8 MMOL/L (ref 3.5–5)
RBC # BLD: 2.78 E12/L (ref 3.5–5.5)
SODIUM BLD-SCNC: 137 MMOL/L (ref 132–146)
WBC # BLD: 8 E9/L (ref 4.5–11.5)

## 2022-12-19 PROCEDURE — 6360000002 HC RX W HCPCS: Performed by: INTERNAL MEDICINE

## 2022-12-19 PROCEDURE — 36415 COLL VENOUS BLD VENIPUNCTURE: CPT

## 2022-12-19 PROCEDURE — 82962 GLUCOSE BLOOD TEST: CPT

## 2022-12-19 PROCEDURE — 6370000000 HC RX 637 (ALT 250 FOR IP): Performed by: INTERNAL MEDICINE

## 2022-12-19 PROCEDURE — 85025 COMPLETE CBC W/AUTO DIFF WBC: CPT

## 2022-12-19 PROCEDURE — 2580000003 HC RX 258: Performed by: INTERNAL MEDICINE

## 2022-12-19 PROCEDURE — 1200000000 HC SEMI PRIVATE

## 2022-12-19 PROCEDURE — 97530 THERAPEUTIC ACTIVITIES: CPT

## 2022-12-19 PROCEDURE — 97110 THERAPEUTIC EXERCISES: CPT

## 2022-12-19 PROCEDURE — 99232 SBSQ HOSP IP/OBS MODERATE 35: CPT | Performed by: INTERNAL MEDICINE

## 2022-12-19 PROCEDURE — 6360000002 HC RX W HCPCS: Performed by: ORTHOPAEDIC SURGERY

## 2022-12-19 PROCEDURE — 6370000000 HC RX 637 (ALT 250 FOR IP): Performed by: PHYSICAL THERAPY ASSISTANT

## 2022-12-19 PROCEDURE — 6370000000 HC RX 637 (ALT 250 FOR IP): Performed by: ORTHOPAEDIC SURGERY

## 2022-12-19 PROCEDURE — 80048 BASIC METABOLIC PNL TOTAL CA: CPT

## 2022-12-19 RX ORDER — INSULIN LISPRO 100 [IU]/ML
8 INJECTION, SOLUTION INTRAVENOUS; SUBCUTANEOUS ONCE
Status: COMPLETED | OUTPATIENT
Start: 2022-12-19 | End: 2022-12-19

## 2022-12-19 RX ORDER — INSULIN GLARGINE 100 [IU]/ML
32 INJECTION, SOLUTION SUBCUTANEOUS 2 TIMES DAILY
Status: DISCONTINUED | OUTPATIENT
Start: 2022-12-19 | End: 2022-12-21 | Stop reason: HOSPADM

## 2022-12-19 RX ADMIN — METHOCARBAMOL 750 MG: 500 TABLET ORAL at 17:06

## 2022-12-19 RX ADMIN — MORPHINE SULFATE 4 MG: 4 INJECTION, SOLUTION INTRAMUSCULAR; INTRAVENOUS at 18:00

## 2022-12-19 RX ADMIN — MORPHINE SULFATE 4 MG: 4 INJECTION, SOLUTION INTRAMUSCULAR; INTRAVENOUS at 12:20

## 2022-12-19 RX ADMIN — ASPIRIN 81 MG 81 MG: 81 TABLET ORAL at 08:20

## 2022-12-19 RX ADMIN — ACETAMINOPHEN 650 MG: 325 TABLET ORAL at 05:16

## 2022-12-19 RX ADMIN — OXYCODONE 5 MG: 5 TABLET ORAL at 19:33

## 2022-12-19 RX ADMIN — ACETAMINOPHEN 650 MG: 325 TABLET ORAL at 18:02

## 2022-12-19 RX ADMIN — SUCRALFATE 1 G: 1 TABLET ORAL at 20:27

## 2022-12-19 RX ADMIN — ONDANSETRON 4 MG: 2 INJECTION INTRAMUSCULAR; INTRAVENOUS at 05:58

## 2022-12-19 RX ADMIN — MORPHINE SULFATE 4 MG: 4 INJECTION, SOLUTION INTRAMUSCULAR; INTRAVENOUS at 21:34

## 2022-12-19 RX ADMIN — METHOCARBAMOL 750 MG: 500 TABLET ORAL at 08:19

## 2022-12-19 RX ADMIN — MORPHINE SULFATE 4 MG: 4 INJECTION, SOLUTION INTRAMUSCULAR; INTRAVENOUS at 03:29

## 2022-12-19 RX ADMIN — INSULIN LISPRO 8 UNITS: 100 INJECTION, SOLUTION INTRAVENOUS; SUBCUTANEOUS at 17:08

## 2022-12-19 RX ADMIN — METHOCARBAMOL 750 MG: 500 TABLET ORAL at 13:00

## 2022-12-19 RX ADMIN — OXYCODONE 5 MG: 5 TABLET ORAL at 11:15

## 2022-12-19 RX ADMIN — INSULIN LISPRO 4 UNITS: 100 INJECTION, SOLUTION INTRAVENOUS; SUBCUTANEOUS at 06:39

## 2022-12-19 RX ADMIN — SUCRALFATE 1 G: 1 TABLET ORAL at 05:16

## 2022-12-19 RX ADMIN — OXYCODONE 5 MG: 5 TABLET ORAL at 15:31

## 2022-12-19 RX ADMIN — ANASTROZOLE 1 MG: 1 TABLET ORAL at 08:30

## 2022-12-19 RX ADMIN — INSULIN LISPRO 8 UNITS: 100 INJECTION, SOLUTION INTRAVENOUS; SUBCUTANEOUS at 20:29

## 2022-12-19 RX ADMIN — DIPHENHYDRAMINE HYDROCHLORIDE 25 MG: 25 TABLET ORAL at 04:16

## 2022-12-19 RX ADMIN — ONDANSETRON 4 MG: 4 TABLET, ORALLY DISINTEGRATING ORAL at 19:26

## 2022-12-19 RX ADMIN — METHOCARBAMOL 750 MG: 500 TABLET ORAL at 20:28

## 2022-12-19 RX ADMIN — INSULIN LISPRO 8 UNITS: 100 INJECTION, SOLUTION INTRAVENOUS; SUBCUTANEOUS at 22:57

## 2022-12-19 RX ADMIN — PANTOPRAZOLE SODIUM 40 MG: 40 TABLET, DELAYED RELEASE ORAL at 05:16

## 2022-12-19 RX ADMIN — INSULIN GLARGINE 32 UNITS: 100 INJECTION, SOLUTION SUBCUTANEOUS at 20:30

## 2022-12-19 RX ADMIN — DOXAZOSIN 1 MG: 1 TABLET ORAL at 08:28

## 2022-12-19 RX ADMIN — SODIUM CHLORIDE 125 MG: 9 INJECTION, SOLUTION INTRAVENOUS at 18:42

## 2022-12-19 RX ADMIN — INSULIN GLARGINE 30 UNITS: 100 INJECTION, SOLUTION SUBCUTANEOUS at 08:32

## 2022-12-19 RX ADMIN — DIPHENHYDRAMINE HYDROCHLORIDE 25 MG: 25 TABLET ORAL at 18:51

## 2022-12-19 RX ADMIN — SODIUM CHLORIDE 100 MG: 9 INJECTION, SOLUTION INTRAVENOUS at 05:17

## 2022-12-19 RX ADMIN — DOCUSATE SODIUM 100 MG: 100 CAPSULE, LIQUID FILLED ORAL at 08:20

## 2022-12-19 RX ADMIN — OXYCODONE 5 MG: 5 TABLET ORAL at 00:50

## 2022-12-19 RX ADMIN — ONDANSETRON 4 MG: 2 INJECTION INTRAMUSCULAR; INTRAVENOUS at 12:20

## 2022-12-19 RX ADMIN — ATORVASTATIN CALCIUM 40 MG: 40 TABLET, FILM COATED ORAL at 20:27

## 2022-12-19 RX ADMIN — SUCRALFATE 1 G: 1 TABLET ORAL at 17:06

## 2022-12-19 RX ADMIN — MORPHINE SULFATE 4 MG: 4 INJECTION, SOLUTION INTRAMUSCULAR; INTRAVENOUS at 08:18

## 2022-12-19 RX ADMIN — INSULIN LISPRO 8 UNITS: 100 INJECTION, SOLUTION INTRAVENOUS; SUBCUTANEOUS at 11:29

## 2022-12-19 RX ADMIN — SUCRALFATE 1 G: 1 TABLET ORAL at 11:11

## 2022-12-19 RX ADMIN — ACETAMINOPHEN 650 MG: 325 TABLET ORAL at 12:23

## 2022-12-19 RX ADMIN — ASPIRIN 81 MG 81 MG: 81 TABLET ORAL at 20:27

## 2022-12-19 ASSESSMENT — PAIN DESCRIPTION - LOCATION
LOCATION: HIP;LEG
LOCATION: LEG
LOCATION: HIP
LOCATION: HIP
LOCATION: ABDOMEN
LOCATION: LEG
LOCATION: HIP;LEG
LOCATION: HIP
LOCATION: HIP
LOCATION: LEG
LOCATION: HIP

## 2022-12-19 ASSESSMENT — PAIN SCALES - GENERAL
PAINLEVEL_OUTOF10: 2
PAINLEVEL_OUTOF10: 2
PAINLEVEL_OUTOF10: 9
PAINLEVEL_OUTOF10: 8
PAINLEVEL_OUTOF10: 10
PAINLEVEL_OUTOF10: 9
PAINLEVEL_OUTOF10: 5
PAINLEVEL_OUTOF10: 2
PAINLEVEL_OUTOF10: 8
PAINLEVEL_OUTOF10: 9
PAINLEVEL_OUTOF10: 5
PAINLEVEL_OUTOF10: 2
PAINLEVEL_OUTOF10: 7
PAINLEVEL_OUTOF10: 5
PAINLEVEL_OUTOF10: 9
PAINLEVEL_OUTOF10: 9
PAINLEVEL_OUTOF10: 2
PAINLEVEL_OUTOF10: 9

## 2022-12-19 ASSESSMENT — PAIN DESCRIPTION - DESCRIPTORS
DESCRIPTORS: ACHING
DESCRIPTORS: ACHING;DISCOMFORT
DESCRIPTORS: ACHING
DESCRIPTORS: ACHING
DESCRIPTORS: DISCOMFORT;SHARP;BURNING
DESCRIPTORS: ACHING;STABBING
DESCRIPTORS: BURNING;SHARP;DISCOMFORT

## 2022-12-19 ASSESSMENT — PAIN DESCRIPTION - ORIENTATION
ORIENTATION: LEFT
ORIENTATION: MID
ORIENTATION: LEFT

## 2022-12-19 ASSESSMENT — PAIN - FUNCTIONAL ASSESSMENT
PAIN_FUNCTIONAL_ASSESSMENT: ACTIVITIES ARE NOT PREVENTED

## 2022-12-19 NOTE — PLAN OF CARE
Problem: Chronic Conditions and Co-morbidities  Goal: Patient's chronic conditions and co-morbidity symptoms are monitored and maintained or improved  12/19/2022 0102 by Paco Patel RN  Outcome: Progressing     Problem: Discharge Planning  Goal: Discharge to home or other facility with appropriate resources  12/19/2022 0102 by Paco Patel RN  Outcome: Progressing     Problem: Pain  Goal: Verbalizes/displays adequate comfort level or baseline comfort level  12/19/2022 0102 by Paco Patel RN  Outcome: Progressing     Problem: Safety - Adult  Goal: Free from fall injury  12/19/2022 0102 by Paco Patel RN  Outcome: Progressing     Problem: ABCDS Injury Assessment  Goal: Absence of physical injury  12/19/2022 0102 by Paco Patel RN  Outcome: Progressing     Problem: Nutrition Deficit:  Goal: Optimize nutritional status  12/19/2022 0102 by Paco Patel RN  Outcome: Progressing     Problem: Musculoskeletal - Adult  Goal: Maintain proper alignment of affected body part  12/19/2022 0102 by Paco Patel RN  Outcome: Progressing

## 2022-12-19 NOTE — PROGRESS NOTES
Physician Progress Note      Trey Baumann  [facility] #:                  [id number]  :                       1975  ADMIT DATE:       2022 11:50 AM  DISCH DATE:  Jacklyn Meléndez  PROVIDER #:        Jennifer Wiggins DO        QUERY TEXT:    Type of Anemia: Please provide further specificity, if known. Clinical indicators include: 8 units, rbc, hgb, hct, postop anemia,   hemoglobin, iron, iron stores, blood loss, transfuse, transfusion,   postoperative anemia  Options provided:  -- Anemia due to acute blood loss  -- Anemia due to chronic blood loss  -- Anemia due to iron deficiency  -- Anemia due to postoperative blood loss  -- Anemia due to chronic disease  -- Other - I will add my own diagnosis  -- Disagree - Not applicable / Not valid  -- Disagree - Clinically Unable to determine / Unknown        PROVIDER RESPONSE TEXT:    The patient has anemia due to postoperative blood loss.       Electronically signed by:  Jennifer Wiggins DO 2022 5:35 PM

## 2022-12-19 NOTE — PROGRESS NOTES
Physical Therapy Treatment Note/Plan of Care    Room #:  3822/6585-25  Patient Name: Ceci Smith  YOB: 1975  MRN: [id number]    Date of Service: 12/19/2022     Tentative placement recommendation: Subacute rehab  Equipment recommendation: To be determined      Evaluating Physical Therapist: Katina Asher, [address] #[id number]     Specific Provider Orders/Date/Referring Provider :   12/16/22 1415    PT eval and treat  Start:  12/16/22 1415,   End:  12/16/22 1415,   ONE TIME,   Standing Count:  1 Occurrences,   R        Last continued at transfer on Fri Dec 16, 2022  8:30 PM    Magnolia Martinez DO     Admitting Diagnosis:   Hyperglycemia [R73.9]  Closed displaced intertrochanteric fracture of left femur, initial encounter [facility]) [S72.142A]  Closed intertrochanteric fracture of hip, left, initial encounter [facility]) [S72.142A]  Intertrochanteric fracture of left hip, closed, initial encounter ([facility] [address] [S72.142A]   Visit diagnosis:     DATE OF PROCEDURE:  12/16/2022    PREOPERATIVE DIAGNOSIS:  Closed, displaced intertrochanteric fracture,    left hip. POSTOPERATIVE DIAGNOSIS:  Closed, displaced intertrochanteric fracture,left hip. OPERATION PERFORMED:  Open reduction and internal fixation of left hip fracture with interlocking gamma nail.     SURGEON:  Sherri Flaherty DO      Visit Diagnoses         Codes    Hyperglycemia    -  Primary R73.9    Closed intertrochanteric fracture of hip, left, initial encounter [facility])     S72.142A    Closed displaced intertrochanteric fracture of left femur, initial encounter [facility])     S72.142A            Patient Active Problem List   Diagnosis    Migraines, neuralgic    Hyperlipidemia with target LDL less than 70    Tobacco abuse    GERD (gastroesophageal reflux disease)    Malignant tumor of breast ([address]    Malignant neoplasm of lower-outer quadrant of right female breast ([address]    Status post chemoradiation    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 FWB on LLE. L Hip ORIF. Limited function due to pain in LLE. Patient is full weight bearing however ambulates with an antalgic gait pattern and only bears very minimal weight through her LLE. Max encouragement needed to participate in therapy session due to patient stating she has already been up and moving. Patient will only allow very small movement; increased time given throughout session. The patient will benefit from continued skilled therapy to increase strength and improve balance for safe functional mobility, to decrease risk of falls, and to meet goals at discharge. PHYSICAL THERAPY  PLAN OF CARE       Physical therapy plan of care is established based on physician order,  patient diagnosis and clinical assessment    Current Treatment Recommendations:    -Bed Mobility: Lower extremity exercises , Upper extremity exercises , and Trunk control activities   -Sitting Balance: Incorporate reaching activities to activate trunk muscles , Hands on support to maintain midline , and Facilitate active trunk muscle engagement   -Standing Balance: Perform strengthening exercises in standing to promote motor control with or without upper extremity support   -Transfers: Provide instruction on proper hand and foot position for adequate transfer of weight onto lower extremities and use of gait device if needed and Cues for hand placement, technique and safety.  Provide stabilization to prevent fall   -Gait: Gait training and Standing activities to improve: base of support, weight shift, weight bearing      PT long term treatment goals are located in below grid    Patient and or family understand(s) diagnosis, prognosis, and plan of care. Frequency of treatments: Patient will be seen  twice daily  for therapeutic exercise, functional retraining, endurance activities, balance exercises, family and patient education.        Prior Level of Function: Patient ambulated independently   Rehab Potential: good for baseline     Past medical history:   Past Medical History:   Diagnosis Date    BRCA1 gene mutation positive     Patient put herself and it was postive per patient    Breast cancer ([facility] [address]     Cancer ([facility] [address]     Breast    Diabetes mellitus ([address]     Headache(784.0)     migraines    Hyperlipidemia     Hypertension     Kidney stone     Lumbar herniated disc 2012    x3    Migraine     Pancreatitis 07/2022    tx at [facility]     Past Surgical History:   Procedure Laterality Date    ANKLE SURGERY      BREAST BIOPSY      BREAST LUMPECTOMY      CHOLECYSTECTOMY      HIP FRACTURE SURGERY Left 12/16/2022    LEFT HIP OPEN REDUCTION INTERNAL FIXATION (SHARATH TABLE) performed by Patricia Jean Baptiste DO at [address] (CERVIX STATUS UNKNOWN)      LEEP      4 PROCEDURES    TIBIA FRACTURE SURGERY      TONSILLECTOMY      TUBAL LIGATION           SUBJECTIVE:    Precautions:  up with assistance , L Hip ORIF (WBAT on LLE), pain     Social history: Patient lives with spouse in a ranch home  with No steps  to enter  Tub shower      Equipment owned: [employer]   How much difficulty turning over in bed?: A Lot  How much difficulty sitting down on / standing up from a chair with arms?: A Lot  How much difficulty moving from lying on back to sitting on side of bed?: A Lot  How much help from another person moving to and from a bed to a chair?: A Lot  How much help from another person needed to walk in hospital room?: Total  How much help from another person for climbing 3-5 steps with a railing?: Total  AM-PAC Inpatient Mobility Raw Score : 10  AM-PAC Inpatient T-Scale Score : 32.29  Mobility Inpatient CMS 0-100% Score: 76.75  Mobility Inpatient CMS G-Code Modifier : CL    Nursing cleared patient for PT treatment. Spouse needing to leave room due to increased pain during session. OBJECTIVE:   Initial Evaluation  Date: 12/17/2022 Treatment Date:  12/19/2022   Short Term/ Long Term   Goals   Was pt agreeable to Eval/treatment? Yes Yes     Pain Level  8/10   left lower extremity  9/10  Left lower extremity      Bed Mobility  Rolling: attempted but unable to roll completely in either direction due to pain     Supine to sit: Maximal assist of 1    Sit to supine: Maximal assist of  2    Scooting: Maximal assist of 1   Rolling: Not assessed    Supine to sit: Moderate assist of 1   Sit to supine: Not assessed    Scooting: Moderate assist of 1    Rolling: Independent    Supine to sit: Independent    Sit to supine: Independent    Scooting: Independent     Transfers Sit to stand: Not assessed  due to pain Sit to stand:  min/mod assist    Sit to stand: Min A   Ambulation    not assessed  6 heel to toe shuffles with LLE and 3 steps with RLE using wheeled walker with moderate assist. Cues needed for walker control, sequencing, balance, safety. 25 feet using  wheeled walker with Min A   Stair negotiation: ascended and descended   Not assessed        ROM Within functional limits except Left hip   Increase range of motion 10% of affected joints    Strength Within functional limits  except  Left lower extremity   Within functional limits   Balance Sitting EOB:  fair posterior lean and off loading left hip   Dynamic Standing: na Sitting EOB: fair right lateral lean to off load left hip.     Dynamic Standing: fair minus  wheeled walker    Sitting EOB:  good    Dynamic Standing:  fair wheeled walker      Patient is Alert & Oriented x person, place, time, and situation and follows directions    Sensation:  Patient reports neuropathy bilateral feet    Edema: yes left lower extremity   Vitals:room air   Blood Pressure at rest   Blood Pressure during session     Heart Rate at rest  Heart Rate during session    SPO2 at rest %  SPO2 during session %     Patient education  Patient educated on role of Physical Therapy, risks of immobility, safety and plan of care,  importance of mobility while in hospital , ankle pumps, quad set and glut set for edema control, blood clot prevention, and safety   weight bearing status. Patient response to education:   Pt verbalized understanding Pt demonstrated skill Pt requires further education in this area   Yes Partial Yes       Treatment:  Patient practiced and was instructed in the following treatment:     supine exercise. Educated on bed mobility, transfers and gait. Sat edge of bed 5 minutes with Minimal assist of 1 to increase dynamic sitting balance and activity tolerance. Right lateral lean off loading hip. Stood, took steps to chair. Lunch present. Therapist asked patient to try an tolerate at least 2 hours in the chair. AP, quad sets x 15 bilateral LE. Straight leg raise, hip abduction/adduction, heel slides x 10 reps RLE only AAROM. Patient did have one sharp pain in LLE while performing SLR on RLE. At end of session, patient in chair with spouse present and nursing aid present  call light and phone within reach,  all lines and tubes intact, nursing notified. Patient would benefit from skilled Home Physical Therapy to improve functional independence and quality of life. Patient's/ family goals   home     Patient and or family understand(s) diagnosis, prognosis, and plan of care.       Time in  1052  Time out  1126    Total Treatment Time  34 minutes      CPT codes:    Therapeutic activities (33900)   18 minutes  1 unit(s)  Therapeutic exercises (60200)   8 minutes  1 unit(s)  Non billable time 8 minutes    JAUN Bradley [facility]#[id number]

## 2022-12-19 NOTE — PROGRESS NOTES
Department of Orthopedic Surgery  Resident Progress Note    POD 2 s/p L hip ORIF. Patient seen and examined. Pain is better 8/10. Had a session of physical therapy yesterday. Was able to get up from bed to chair. No new complaints. Denies chest pain, shortness of breath, dizziness/lightheadedness. VITALS:  /75   Pulse 97   Temp 99 °F (37.2 °C) (Oral)   Resp 18   Ht 5' 7\" (1.702 m)   Wt 170 lb (77.1 kg)   LMP 08/16/2011   SpO2 95%   BMI 26.63 kg/m²     General: In bed, comfortable    MUSCULOSKELETAL:   left lower extremity:  Dressing C/D/I, some strike through noted  Compartments soft and compressible  Calf is soft and nontender  +PF/DF/EHL  Brisk Cap refill, Toes warm and perfused  Distal sensation grossly intact to Peroneals, Sural, Saphenous, and tibial nrs    CBC:   Lab Results   Component Value Date/Time    WBC 8.0 12/19/2022 03:51 AM    HGB 8.3 12/19/2022 03:51 AM    HCT 25.3 12/19/2022 03:51 AM     12/19/2022 03:51 AM     PT/INR:  No results found for: PROTIME, INR    ASSESSMENT  S/P L hip ORIF for IT fx - 12/16/22    PLAN      Continue physical therapy and protocol: WBAT - L LE  Continue Deep venous thrombosis prophylaxis - asa 81 mg BID, early mobilization  Continue PT/OT today. Encouraged patient to undergo  Pain Control: IV and PO, norco discontinued, percocet added. Tylenol scheduled q6h. Continue Robaxin   Continue to Monitor H&H 8.3 today  D/C Plan:  pending sw/cm and therapy recommendations.  Ok to discharge from orthopedic standpoint once appropriate discharge plan is in place    Electronically signed by Osiel Foote DO on 12/19/2022 at 6:11 AM

## 2022-12-19 NOTE — PROGRESS NOTES
[address] [place], [place] [address]ist   Progress Note    Admitting Date and Time: 12/16/2022 11:50 AM  Admit Dx: Hyperglycemia [R73.9]  Closed displaced intertrochanteric fracture of left femur, initial encounter ([facility] [address] [S72.142A]  Closed intertrochanteric fracture of hip, left, initial encounter ([facility] [address] Patricia Eason  Intertrochanteric fracture of left hip, closed, initial encounter ([facility] [address] Patricia Eason    Subjective/interval history:    12/17: Pt states pain is not controlled and states had not been less than 9/10 today. Per RN, worked with therapy but only able to get to edge of bed.    12/18: Patient states pain is somewhat better controlled alternating morphine with Percocet. She did get up with therapy today was able to transfer to chair.  at bedside    Per RN: Hgb trending down. 12/19: Patient feels better overall today. Still has some postoperative pain, but well controlled. He notes concern about hemoglobin trending down. It went from 8.6 yesterday to 8.3 today. Discussed that it is a relatively small decrease, but we are continuing to monitor daily.        ferric gluconate (FERRLECIT) IVPB  125 mg IntraVENous Once    insulin glargine  30 Units SubCUTAneous BID    insulin lispro  0-8 Units SubCUTAneous 4x Daily AC & HS    acetaminophen  650 mg Oral 4 times per day    Or    acetaminophen  650 mg Rectal 4 times per day    methocarbamol  750 mg Oral 4x Daily    aspirin  81 mg Oral BID    docusate sodium  100 mg Oral BID    polyethylene glycol  17 g Oral Daily    anastrozole  1 mg Oral Daily    doxazosin  1 mg Oral Daily    pantoprazole  40 mg Oral QAM AC    sucralfate  1 g Oral 4x Daily AC & HS    atorvastatin  40 mg Oral Nightly    sodium chloride flush  5-40 mL IntraVENous 2 times per day     morphine, 2 mg, Q3H PRN   Or  morphine, 4 mg, Q3H PRN  oxyCODONE, 5 mg, Q4H PRN  sodium chloride flush, 5-40 mL, PRN  sodium chloride, , PRN  ondansetron, 4 mg, Q8H PRN   Or  ondansetron, 4 mg, Q6H PRN  polyethylene glycol, 17 g, Daily PRN  glucose, 4 tablet, PRN  dextrose bolus, 125 mL, PRN   Or  dextrose bolus, 250 mL, PRN  glucagon (rDNA), 1 mg, PRN  dextrose, , Continuous PRN  HYDROmorphone, 0.5 mg, Q5 Min PRN  diphenhydrAMINE, 25 mg, Q8H PRN       Objective:    /75   Pulse 97   Temp 99 °F (37.2 °C) (Oral)   Resp 16   Ht 5' 7\" (1.702 m)   Wt 170 lb (77.1 kg)   LMP 08/16/2011   SpO2 95%   BMI 26.63 kg/m²   General Appearance: alert and oriented to person, place and time and in no acute distress  Skin: warm and dry  Head: normocephalic and atraumatic  Eyes: pupils equal, round, and reactive to light, extraocular eye movements intact, conjunctivae normal  Neck: neck supple and non tender without mass   Pulmonary/Chest: Nonlabored on room air. Clear to auscultation bilaterally  Cardiovascular: normal rate, normal S1 and S2 and no carotid bruits  Abdomen: soft, non-tender, non-distended, normal bowel sounds, no masses or organomegaly  Extremities: Trace bilateral pedal edema. No calf tenderness. No cyanosis or clubbing. Left thigh bandage in place.   Neurologic: no cranial nerve deficit and speech normal      Recent Labs     12/18/22  0757 12/19/22  0351    137   K 3.9 3.8    103   CO2 23 27   BUN 9 7   CREATININE 0.5 0.5   GLUCOSE 279* 230*   CALCIUM 8.0* 8.3*       Recent Labs     12/18/22  0757   ALKPHOS 73   PROT 5.0*   LABALBU 2.7*   BILITOT 0.2   AST 10   ALT 9       Recent Labs     12/16/22  1902 12/18/22  0757 12/19/22  0351   WBC  --  10.3 8.0   RBC  --  2.83* 2.78*   HGB 11.8 8.6* 8.3*   HCT 36.6 25.8* 25.3*   MCV  --  91.2 91.0   MCH  --  30.4 29.9   MCHC  --  33.3 32.8   RDW  --  13.9 13.8   PLT  --  191 206   MPV  --  11.6 11.8       Hemoglobin A1c [[id number]] (Abnormal) Collected: 12/18/22 0757     Specimen: Blood Updated: 12/18/22 1332      Hemoglobin A1C 13.0 %        Radiology:   Fluoro For Surgical Procedures   Final Result   Intraprocedural fluoroscopic spot images as above. See separate procedure   report for more information. XR HIP LEFT (2-3 VIEWS)   Final Result   Mildly displaced and comminuted intertrochanteric fracture at the left femur. XR FEMUR LEFT (MIN 2 VIEWS)   Final Result   Mildly displaced and comminuted intertrochanteric fracture at the left femur. CT HEAD WO CONTRAST   Final Result   No acute intracranial abnormality. No acute osseous abnormality of the cervical spine. Degenerative changes of the cervical spine. CT CSpine W/O Contrast   Final Result   No acute intracranial abnormality. No acute osseous abnormality of the cervical spine. Degenerative changes of the cervical spine. Assessment and Plan:  Principal Problem:    Intertrochanteric fracture of left hip, closed, initial encounter [facility])  Active Problems:    Type 2 diabetes mellitus with hyperglycemia, with long-term current use of insulin ([facility])    Postoperative anemia    Primary hypertension    Hyperlipidemia with target LDL less than 70    GERD (gastroesophageal reflux disease)  Resolved Problems:    * No resolved hospital problems. *                     Intertrochanteric fracture left hip s/p mechani  -POD #3 ORIF with interlocking gamma nail  -Pain control as per orthopedics   -Started bowel regimen with Miralax daily and colace 100 mg bid  -DVT prophylaxis per orthopaedic surgery      2. Postoperative anemia  -EBL was about 700 ml per my discussion with ortho residents night of surgery  -Hgb trend 15.5-->11.8-->8.6-->8. 3. Continue to monitor hemoglobin  -Started on Ferrlecit protocol  -Transfuse if hemoglobin less than 7    3. Uncontrolled type II diabetes mellitus on long term insulin  -Blood glucose still persistently elevated. Increase Lantus to 32 units twice daily  -Continue to hold metformin, medium dose sliding scale insulin for now  -Checked A1c and this came back at 13.0% indicating very poor control.   Last one on file was 11.4% in June 2022. 3.         Primary hypertension and hyperlipidemia   -Continue doxazosin but hold lisinopril until we ensure creatinine is stable post surgery. Creatinine stable at 0.5 but blood pressure remained soft so we will hold off on resuming lisinopril at this point in time.  -Continue atorvastatin     4. GERD  -Continue omeprazole and Carafate     5. Disposition  -Likely medically stable for discharge in the next 24 to 48 hours. Case management and social work following for subacute rehab placement. Case discussed with patient's  at bedside. NOTE: Portions of this report were transcribed using voice recognition software. Every effort was made to ensure accuracy; however, inadvertent computerized transcription errors may be present.        Electronically signed by Darcel Pallas, DO on 12/19/2022 at 5:22 PM

## 2022-12-19 NOTE — CARE COORDINATION
12/19/2022: SS Note/Discharge plan:  Notified by [address], Public Benefits office of the [facility] no longer does \"Presumbtive\" medicaid but that pt is considered \"pending\" medicaid, # [phone], pt requesting Inpt rehab, per [facility] admissions at [facility] they can NOT accept medicaid pending only presumptive,met with pt, reviewed rehab options for pending medicaid, pt NOT receptive to placement at [facility]. [facility] re[facility], prefers [facility] & to go to [facility] (1st pref) or to [facility] or [facility] as an alternative plan, referrals made to facility admissions liaisons, awaiting chart review and acceptance, NO PRE CERT NEEDED, sw/cm to follow.  Electronically signed by FUNMILAYO Resendiz on 12/19/2022 at 4:00 PM

## 2022-12-20 ENCOUNTER — APPOINTMENT (OUTPATIENT)
Dept: GENERAL RADIOLOGY | Age: 47
DRG: 481 | End: 2022-12-20
Payer: MEDICAID

## 2022-12-20 PROBLEM — R19.7 DIARRHEA IN ADULT PATIENT: Status: ACTIVE | Noted: 2022-12-20

## 2022-12-20 PROBLEM — E87.6 HYPOKALEMIA: Status: ACTIVE | Noted: 2022-12-20

## 2022-12-20 LAB
ANION GAP SERPL CALCULATED.3IONS-SCNC: 9 MMOL/L (ref 7–16)
BUN BLDV-MCNC: 7 MG/DL (ref 6–20)
CALCIUM SERPL-MCNC: 8.8 MG/DL (ref 8.6–10.2)
CHLORIDE BLD-SCNC: 101 MMOL/L (ref 98–107)
CO2: 27 MMOL/L (ref 22–29)
CREAT SERPL-MCNC: 0.5 MG/DL (ref 0.5–1)
GFR SERPL CREATININE-BSD FRML MDRD: >60 ML/MIN/1.73
GLUCOSE BLD-MCNC: 116 MG/DL (ref 74–99)
HCT VFR BLD CALC: 26.5 % (ref 34–48)
HEMOGLOBIN: 8.6 G/DL (ref 11.5–15.5)
MCH RBC QN AUTO: 30 PG (ref 26–35)
MCHC RBC AUTO-ENTMCNC: 32.5 % (ref 32–34.5)
MCV RBC AUTO: 92.3 FL (ref 80–99.9)
METER GLUCOSE: 120 MG/DL (ref 74–99)
METER GLUCOSE: 187 MG/DL (ref 74–99)
METER GLUCOSE: 254 MG/DL (ref 74–99)
METER GLUCOSE: 264 MG/DL (ref 74–99)
METER GLUCOSE: 347 MG/DL (ref 74–99)
PDW BLD-RTO: 13.8 FL (ref 11.5–15)
PLATELET # BLD: 256 E9/L (ref 130–450)
PMV BLD AUTO: 10.7 FL (ref 7–12)
POTASSIUM SERPL-SCNC: 3.3 MMOL/L (ref 3.5–5)
RBC # BLD: 2.87 E12/L (ref 3.5–5.5)
SODIUM BLD-SCNC: 137 MMOL/L (ref 132–146)
WBC # BLD: 8.6 E9/L (ref 4.5–11.5)

## 2022-12-20 PROCEDURE — 6370000000 HC RX 637 (ALT 250 FOR IP): Performed by: PHYSICAL THERAPY ASSISTANT

## 2022-12-20 PROCEDURE — 6360000002 HC RX W HCPCS: Performed by: INTERNAL MEDICINE

## 2022-12-20 PROCEDURE — 6370000000 HC RX 637 (ALT 250 FOR IP): Performed by: INTERNAL MEDICINE

## 2022-12-20 PROCEDURE — 1200000000 HC SEMI PRIVATE

## 2022-12-20 PROCEDURE — 2580000003 HC RX 258: Performed by: ORTHOPAEDIC SURGERY

## 2022-12-20 PROCEDURE — 6360000002 HC RX W HCPCS: Performed by: ORTHOPAEDIC SURGERY

## 2022-12-20 PROCEDURE — 80048 BASIC METABOLIC PNL TOTAL CA: CPT

## 2022-12-20 PROCEDURE — 74018 RADEX ABDOMEN 1 VIEW: CPT

## 2022-12-20 PROCEDURE — 97535 SELF CARE MNGMENT TRAINING: CPT

## 2022-12-20 PROCEDURE — 6370000000 HC RX 637 (ALT 250 FOR IP): Performed by: ORTHOPAEDIC SURGERY

## 2022-12-20 PROCEDURE — 36415 COLL VENOUS BLD VENIPUNCTURE: CPT

## 2022-12-20 PROCEDURE — 85027 COMPLETE CBC AUTOMATED: CPT

## 2022-12-20 PROCEDURE — 82962 GLUCOSE BLOOD TEST: CPT

## 2022-12-20 RX ORDER — PROCHLORPERAZINE EDISYLATE 5 MG/ML
5 INJECTION INTRAMUSCULAR; INTRAVENOUS EVERY 4 HOURS PRN
Status: DISCONTINUED | OUTPATIENT
Start: 2022-12-20 | End: 2022-12-21 | Stop reason: HOSPADM

## 2022-12-20 RX ORDER — LOPERAMIDE HYDROCHLORIDE 2 MG/1
2 CAPSULE ORAL 3 TIMES DAILY PRN
Status: DISCONTINUED | OUTPATIENT
Start: 2022-12-20 | End: 2022-12-21 | Stop reason: HOSPADM

## 2022-12-20 RX ORDER — POTASSIUM CHLORIDE 20 MEQ/1
40 TABLET, EXTENDED RELEASE ORAL ONCE
Status: COMPLETED | OUTPATIENT
Start: 2022-12-20 | End: 2022-12-20

## 2022-12-20 RX ADMIN — ONDANSETRON 4 MG: 2 INJECTION INTRAMUSCULAR; INTRAVENOUS at 08:43

## 2022-12-20 RX ADMIN — OXYCODONE 5 MG: 5 TABLET ORAL at 04:04

## 2022-12-20 RX ADMIN — LOPERAMIDE HYDROCHLORIDE 2 MG: 2 CAPSULE ORAL at 15:18

## 2022-12-20 RX ADMIN — ACETAMINOPHEN 650 MG: 325 TABLET ORAL at 20:27

## 2022-12-20 RX ADMIN — MORPHINE SULFATE 4 MG: 4 INJECTION, SOLUTION INTRAMUSCULAR; INTRAVENOUS at 06:45

## 2022-12-20 RX ADMIN — LOPERAMIDE HYDROCHLORIDE 2 MG: 2 CAPSULE ORAL at 20:27

## 2022-12-20 RX ADMIN — ACETAMINOPHEN 650 MG: 325 TABLET ORAL at 05:43

## 2022-12-20 RX ADMIN — ACETAMINOPHEN 650 MG: 325 TABLET ORAL at 00:01

## 2022-12-20 RX ADMIN — OXYCODONE 5 MG: 5 TABLET ORAL at 00:01

## 2022-12-20 RX ADMIN — POTASSIUM CHLORIDE 40 MEQ: 1500 TABLET, EXTENDED RELEASE ORAL at 15:54

## 2022-12-20 RX ADMIN — PROCHLORPERAZINE EDISYLATE 5 MG: 5 INJECTION INTRAMUSCULAR; INTRAVENOUS at 17:03

## 2022-12-20 RX ADMIN — MORPHINE SULFATE 4 MG: 4 INJECTION, SOLUTION INTRAMUSCULAR; INTRAVENOUS at 01:56

## 2022-12-20 RX ADMIN — SUCRALFATE 1 G: 1 TABLET ORAL at 20:28

## 2022-12-20 RX ADMIN — SUCRALFATE 1 G: 1 TABLET ORAL at 05:44

## 2022-12-20 RX ADMIN — ASPIRIN 81 MG 81 MG: 81 TABLET ORAL at 20:28

## 2022-12-20 RX ADMIN — INSULIN HUMAN 8 UNITS: 100 INJECTION, SOLUTION PARENTERAL at 14:51

## 2022-12-20 RX ADMIN — METHOCARBAMOL 750 MG: 500 TABLET ORAL at 17:03

## 2022-12-20 RX ADMIN — SODIUM CHLORIDE, PRESERVATIVE FREE 10 ML: 5 INJECTION INTRAVENOUS at 20:30

## 2022-12-20 RX ADMIN — ASPIRIN 81 MG 81 MG: 81 TABLET ORAL at 15:54

## 2022-12-20 RX ADMIN — METHOCARBAMOL 750 MG: 500 TABLET ORAL at 20:27

## 2022-12-20 RX ADMIN — ONDANSETRON 4 MG: 2 INJECTION INTRAMUSCULAR; INTRAVENOUS at 14:51

## 2022-12-20 RX ADMIN — ONDANSETRON 4 MG: 2 INJECTION INTRAMUSCULAR; INTRAVENOUS at 20:30

## 2022-12-20 RX ADMIN — ANASTROZOLE 1 MG: 1 TABLET ORAL at 15:55

## 2022-12-20 RX ADMIN — INSULIN LISPRO 4 UNITS: 100 INJECTION, SOLUTION INTRAVENOUS; SUBCUTANEOUS at 17:03

## 2022-12-20 RX ADMIN — INSULIN LISPRO 6 UNITS: 100 INJECTION, SOLUTION INTRAVENOUS; SUBCUTANEOUS at 12:16

## 2022-12-20 RX ADMIN — DIPHENHYDRAMINE HYDROCHLORIDE 25 MG: 25 TABLET ORAL at 04:07

## 2022-12-20 RX ADMIN — INSULIN GLARGINE 32 UNITS: 100 INJECTION, SOLUTION SUBCUTANEOUS at 20:38

## 2022-12-20 RX ADMIN — SUCRALFATE 1 G: 1 TABLET ORAL at 15:54

## 2022-12-20 RX ADMIN — DOXAZOSIN 1 MG: 1 TABLET ORAL at 15:54

## 2022-12-20 RX ADMIN — PROCHLORPERAZINE EDISYLATE 5 MG: 5 INJECTION INTRAMUSCULAR; INTRAVENOUS at 10:55

## 2022-12-20 RX ADMIN — ATORVASTATIN CALCIUM 40 MG: 40 TABLET, FILM COATED ORAL at 20:28

## 2022-12-20 RX ADMIN — PANTOPRAZOLE SODIUM 40 MG: 40 TABLET, DELAYED RELEASE ORAL at 05:44

## 2022-12-20 ASSESSMENT — PAIN SCALES - GENERAL
PAINLEVEL_OUTOF10: 8
PAINLEVEL_OUTOF10: 8
PAINLEVEL_OUTOF10: 10
PAINLEVEL_OUTOF10: 8
PAINLEVEL_OUTOF10: 9

## 2022-12-20 ASSESSMENT — PAIN DESCRIPTION - LOCATION
LOCATION: HIP
LOCATION: HIP

## 2022-12-20 ASSESSMENT — PAIN DESCRIPTION - ORIENTATION
ORIENTATION: LEFT
ORIENTATION: LEFT

## 2022-12-20 ASSESSMENT — PAIN DESCRIPTION - DESCRIPTORS: DESCRIPTORS: ACHING;TENDER;DISCOMFORT

## 2022-12-20 NOTE — CARE COORDINATION
12/20/2022: SS Note/Discharge plan:  Met with pt who says she did better today with therapy and has decided to go directly home, pt prefers [employer], [facility] for start of care tomorrow or Thursday and to [place] at [facility] for a w/w, bsc, & ttb, company to deliver dme to pt's hospital room today between 2-6pm, pt & nursing notified. Pt's  will transport pt home. SNF referrals cancelled.  Electronically signed by FUNMILAYO Lal on 12/20/2022 at 11:50 AM

## 2022-12-20 NOTE — PROGRESS NOTES
[address] [place], [place] [address]ist   Progress Note    Admitting Date and Time: 12/16/2022 11:50 AM  Admit Dx: Hyperglycemia [R73.9]  Closed displaced intertrochanteric fracture of left femur, initial encounter ([facility] [address] [S72.142A]  Closed intertrochanteric fracture of hip, left, initial encounter ([facility] [address] Vicente Payton  Intertrochanteric fracture of left hip, closed, initial encounter ([facility] [address] Vicente Patyon    Subjective/interval history:    12/17: Pt states pain is not controlled and states had not been less than 9/10 today. Per RN, worked with therapy but only able to get to edge of bed.    12/18: Patient states pain is somewhat better controlled alternating morphine with Percocet. She did get up with therapy today was able to transfer to chair.  at bedside    Per RN: Hgb trending down. 12/19: Patient feels better overall today. Still has some postoperative pain, but well controlled. He notes concern about hemoglobin trending down. It went from 8.6 yesterday to 8.3 today. Discussed that it is a relatively small decrease, but we are continuing to monitor daily. 12/20: Patient having significant diarrhea and abdominal cramping today. She has been on bowel regimen given she is still requiring IV morphine. Last 2 doses of Colace held.      potassium chloride  40 mEq Oral Once    insulin glargine  32 Units SubCUTAneous BID    insulin lispro  0-8 Units SubCUTAneous 4x Daily AC & HS    acetaminophen  650 mg Oral 4 times per day    Or    acetaminophen  650 mg Rectal 4 times per day    methocarbamol  750 mg Oral 4x Daily    aspirin  81 mg Oral BID    [Held by provider] docusate sodium  100 mg Oral BID    [Held by provider] polyethylene glycol  17 g Oral Daily    anastrozole  1 mg Oral Daily    doxazosin  1 mg Oral Daily    pantoprazole  40 mg Oral QAM AC    sucralfate  1 g Oral 4x Daily AC & HS    atorvastatin  40 mg Oral Nightly    sodium chloride flush  5-40 mL IntraVENous 2 times per day     prochlorperazine, 5 mg, Q4H PRN  morphine, 2 mg, Q3H PRN   Or  morphine, 4 mg, Q3H PRN  oxyCODONE, 5 mg, Q4H PRN  sodium chloride flush, 5-40 mL, PRN  sodium chloride, , PRN  ondansetron, 4 mg, Q8H PRN   Or  ondansetron, 4 mg, Q6H PRN  polyethylene glycol, 17 g, Daily PRN  glucose, 4 tablet, PRN  dextrose bolus, 125 mL, PRN   Or  dextrose bolus, 250 mL, PRN  glucagon (rDNA), 1 mg, PRN  dextrose, , Continuous PRN  HYDROmorphone, 0.5 mg, Q5 Min PRN  diphenhydrAMINE, 25 mg, Q8H PRN       Objective:    /65   Pulse 96   Temp 98.2 °F (36.8 °C) (Oral)   Resp 18   Ht 5' 7\" (1.702 m)   Wt 170 lb (77.1 kg)   LMP 08/16/2011   SpO2 97%   BMI 26.63 kg/m²   General Appearance: alert and oriented to person, place and time, appears fatigued and with discomfort  Skin: warm and dry  Head: normocephalic and atraumatic  Eyes: pupils equal, round, and reactive to light, extraocular eye movements intact, conjunctivae normal  Neck: neck supple and non tender without mass   Pulmonary/Chest: Nonlabored on room air. Clear to auscultation bilaterally  Cardiovascular: normal rate, normal S1 and S2 and no carotid bruits  Abdomen: Hypoactive bowel sounds. Soft, mildly distended, mild diffuse tenderness without peritoneal signs, no palpable masses organomegaly  Extremities: Trace bilateral pedal edema. No calf tenderness. No cyanosis or clubbing. Left thigh bandage in place.   Neurologic: no cranial nerve deficit and speech normal      Recent Labs     12/18/22  0757 12/19/22  0351 12/20/22  0354    137 137   K 3.9 3.8 3.3*    103 101   CO2 23 27 27   BUN 9 7 7   CREATININE 0.5 0.5 0.5   GLUCOSE 279* 230* 116*   CALCIUM 8.0* 8.3* 8.8       Recent Labs     12/18/22  0757   ALKPHOS 73   PROT 5.0*   LABALBU 2.7*   BILITOT 0.2   AST 10   ALT 9       Recent Labs     12/18/22  0757 12/19/22  0351 12/20/22  0354   WBC 10.3 8.0 8.6   RBC 2.83* 2.78* 2.87*   HGB 8.6* 8.3* 8.6*   HCT 25.8* 25.3* 26.5*   MCV 91.2 91.0 92.3   MCH 30.4 29.9 30.0 MCHC 33.3 32.8 32.5   RDW 13.9 13.8 13.8    206 256   MPV 11.6 11.8 10.7       Hemoglobin A1c [[id number]] (Abnormal) Collected: 12/18/22 0757     Specimen: Blood Updated: 12/18/22 1332      Hemoglobin A1C 13.0 %        Radiology:   Fluoro For Surgical Procedures   Final Result   Intraprocedural fluoroscopic spot images as above. See separate procedure   report for more information. XR HIP LEFT (2-3 VIEWS)   Final Result   Mildly displaced and comminuted intertrochanteric fracture at the left femur. XR FEMUR LEFT (MIN 2 VIEWS)   Final Result   Mildly displaced and comminuted intertrochanteric fracture at the left femur. CT HEAD WO CONTRAST   Final Result   No acute intracranial abnormality. No acute osseous abnormality of the cervical spine. Degenerative changes of the cervical spine. CT CSpine W/O Contrast   Final Result   No acute intracranial abnormality. No acute osseous abnormality of the cervical spine. Degenerative changes of the cervical spine. XR ABDOMEN (KUB) (SINGLE AP VIEW)    (Results Pending)   XR ABDOMEN (KUB) (SINGLE AP VIEW)    (Results Pending)       Assessment and Plan:  Principal Problem:    Closed displaced intertrochanteric fracture of left femur (HCC)  Active Problems:    Type 2 diabetes mellitus with hyperglycemia, with long-term current use of insulin (HCC)    Postoperative anemia    Diarrhea in adult patient    Hypokalemia    Primary hypertension    Hyperlipidemia with target LDL less than 70    GERD (gastroesophageal reflux disease)  Resolved Problems:    * No resolved hospital problems. *                     Intertrochanteric fracture left hip due to mechanical fall  -POD #4 ORIF with interlocking gamma nail  -Pain control as per orthopedics   -Started bowel regimen with Miralax daily and colace 100 mg bid but we will hold this for now given new development of diarrhea  -DVT prophylaxis per orthopaedic surgery      2. Postoperative anemia  -EBL was about 700 ml per my discussion with ortho residents night of surgery  -Hgb trend 15.5-->11.8-->8.6-->8.3-->8. 6. Continue to monitor hemoglobin  -Completed IV Ferrlecit protocol  -Transfuse if hemoglobin less than 7    3. Uncontrolled type II diabetes mellitus on long term insulin  -Blood glucose is persistently elevated, so Lantus increased to 32 units twice daily with improved glycemic control  -Continue to hold metformin, medium dose sliding scale insulin for now  -Checked A1c and this came back at 13.0% indicating very poor control. Last one on file was 11.4% in June 2022. 4.         Primary hypertension and hyperlipidemia   -Continue doxazosin but hold lisinopril until we ensure creatinine is stable post surgery. Creatinine stable at 0.5 but blood pressure remains low normal so we will hold off on resuming lisinopril at this point in time.  -Continue atorvastatin     5. GERD  -Continue omeprazole and Carafate    6. Diarrhea with nausea and vomiting  -Given hypoactive bowel sounds with abdominal distention and tenderness, suspect she may have a large stool burden and diarrhea as a result of this. Ileus is also a consideration. Will obtain stat portable KUB to further assess. Hold MiraLAX and Colace for now. 7. Hypokalemia  -oral replacement      8. Disposition  -Likely medically stable for discharge in the next 24 to 48 hours. Case management and social work following for subacute rehab placement. Discussed with RN at bedside       NOTE: Portions of this report were transcribed using voice recognition software. Every effort was made to ensure accuracy; however, inadvertent computerized transcription errors may be present.        Electronically signed by Adrienne Madsen DO on 12/20/2022 at 12:17 PM

## 2022-12-20 NOTE — PROGRESS NOTES
Attempted tx with pt, however pt nsg states pt is not appropriate for treatment in clinic at this time 2* to diarrhea. Will attempt tx with pt at later time/date.  Eliane Postal, [address]

## 2022-12-20 NOTE — PROGRESS NOTES
Physical Therapy      Physical Therapy    Room #:   0323/0323-02    Date: 2022       Patient Name: Pankaj Chandler  : 1975      MRN: [id number]     Patient unavailable for physical therapy treatment due to      being inappropriate this morning, patient was checked on multiple times this morning and was on the commode. Stomach pain, and nausea. Patient was standing from bed to get to commode at one point, educated on safety and waiting for assist to get to the commode. Will talk to nursing later today to see if it will be appropriate to work  with her later. Thank you.      Dashawn Almeida, PTA

## 2022-12-20 NOTE — PROGRESS NOTES
OCCUPATIONAL THERAPY BEDSIDE TREATMENT NOTE   [facility]  [facility] [place]    Date:2022  Patient Name: Ceci Smith  MRN: [id number]  : 1975  Room: [address] [place] [address]        Evaluating OT: Pat Posada, OTR/L; [id number]        Referring Provider and Orders/Date:    OT eval and treat  Start:  22 1415,   End:  22 1415,   ONE TIME,   Standing Count:  1 Occurrences,   R        Last continued at transfer on Fri Dec 16, 2022  8:30 PM    Hedda Prlor, DO         Diagnosis:   1. Hyperglycemia    2. Closed intertrochanteric fracture of hip, left, initial encounter ([facility] [address]    3. Closed displaced intertrochanteric fracture of left femur, initial encounter ([facility] [address]    4.  Intertrochanteric fracture of left hip, closed, initial encounter [facility])          Surgery: S/P L hip ORIF for IT fx - 22      Pertinent Medical History:       Past Medical History        Past Medical History:   Diagnosis Date    BRCA1 gene mutation positive       Patient put herself and it was postive per patient    Breast cancer ([facility] [address]      Cancer ([facility] [address]       Breast    Diabetes mellitus ([facility] [address]      Headache(784.0)       migraines    Hyperlipidemia      Hypertension      Kidney stone      Lumbar herniated disc 2012     x3    Migraine      Pancreatitis 2022     tx at [facility]             Past Surgical History         Past Surgical History:   Procedure Laterality Date    ANKLE SURGERY        BREAST BIOPSY        BREAST LUMPECTOMY        CHOLECYSTECTOMY        HYSTERECTOMY (CERVIX STATUS UNKNOWN)        LEEP         4 PROCEDURES    TIBIA FRACTURE SURGERY        TONSILLECTOMY        TUBAL LIGATION               Precautions:  Fall Risk, WBAT L LE, jerez    Recommended placement: subacute (although pt states she is going home)    Assessment of current deficits     [x] Functional mobility           [x]ADLs           [x] Strength                   []Cognition [x] Functional transfers          [x] IADLs          [x] Safety Awareness   [x]Endurance     [] Fine Coordination                         [x] Balance      [] Vision/perception    []Sensation       [x]Gross Motor Coordination             [] ROM           [] Delirium                   [] Motor Control      OT PLAN OF CARE   OT POC based on physician orders, patient diagnosis and results of clinical assessment     Frequency/Duration 1-3 days/wk for 2 weeks PRN   Specific OT Treatment Interventions to include:   * Instruction/training on adapted ADL techniques and AE recommendations to increase functional independence within precautions       * Training on energy conservation strategies, correct breathing pattern and techniques to improve independence/tolerance for self-care routine  * Functional transfer/mobility training/DME recommendations for increased independence, safety, and fall prevention  * Patient/Family education to increase follow through with safety techniques and functional independence  * Recommendation of environmental modifications for increased safety with functional transfers/mobility and ADLs  * Therapeutic exercise to improve motor endurance, ROM, and functional strength for ADLs/functional transfers  * Therapeutic activities to facilitate/challenge dynamic balance, stand tolerance for increased safety and independence with ADLs  * Therapeutic activities to facilitate gross/fine motor skills for increased independence with ADLs  * Neuro-muscular re-education: facilitation of righting/equilibrium reactions, midline orientation, scapular stability/mobility, normalization of muscle tone, and facilitation of volitional active controled movement  * Positioning to improve skin integrity, interaction with environment and functional independence      Recommended Adaptive Equipment/DME: Tub transfer bench, bedside commode, front wheeled walker  Comment: pt provided with AE for LE dressing with good understanding and follow through      Home Living: Pt lives with spouse in a 1 story home with no steps to enter. Spouse is on disability. Laundry on first floor. Can have additional social support if needed. Bathroom setup: tub shower and shower chair; standard toilet    DME owned: none      Prior Level of Function: indep with ADLs, indep with IADLs; ambulated indep   Driving: yes   Occupation: door dash   Enjoys: reading, playing video games, getting a new dog     Pain Level: unquantified pain in L hip; nsg aware  Cognition: A&O: 4/4; Follows 3 step directions              Memory:  Intact              Sequencing:  Intact              Problem solving:  Intact              Judgement/safety:  Intact     AM-PAC Daily Activity Inpatient   How much help for putting on and taking off regular lower body clothing?: A Lot  How much help for Bathing?: A Lot  How much help for Toileting?: Total  How much help for putting on and taking off regular upper body clothing?: A Lot  How much help for taking care of personal grooming?: A Little  How much help for eating meals?: None  AM-PAC Inpatient Daily Activity Raw Score: 14     Functional Assessment:      Initial Eval Status  Date: 12/18/2022   Treatment Status  Date:12/20/22 STGs = LTGs  Time frame: 10-14 days   Feeding Independent N/T  NA-PLOF   Grooming Stand by Assist and set up required from supine level N/T Independent    UB Dressing Maximal Assist with gown management from supine and sitting EOB N/T Indep   LB Dressing Dependent with socks from supine and sitting Min A/Mod A with use of AE for donning/doffing socks  2* to ORIF in LLE; ; good understanding and follow through Minimal Assist    Bathing Maximal Assist due to pain from supine level. Assist for LB and buttocks. Pt was able to wash top of thighs. N/T; pt education on use of LH sponge and use of DME in shower for safety Minimal Assist    Toileting Dependent with jerez;  Recommending 3:1 Pt seated on BSC in room on arrival 2* to diarrhea  Stand by Assist    Bed Mobility  Supine to sit: Moderate Assist   Sit to supine: Maximal Assist  For LB assist due to pain  N/T; pt seated on BSC on arrival and at end of session  Supine to sit: Stand by Assist   Sit to supine: Stand by Assist    Functional Transfers Moderate Assist with walker from elevated surface of bed. Not able to test chair or 3:1 due to pain reported by patient N/T 2* to pt having diarrhea on BSC  Stand by Assist    Functional Mobility Minimal Assist with walker for side stepping to [facility]  N/T  Stand by Assist    Balance Sitting:     Static:  fair    Dynamic:fair-  Standing: poor+  Sitting:     Static:  fair    Dynamic:fair  Standing: N/T 2* to pt having diarrhea Sitting:     Static:  good    Dynamic:good  Standing: fair   Activity Tolerance Vitals with activity:room air with 98% Hr  with session; sitting EOB for 7min average and standing for 1min    Fair minus d/t bowel issues  Increase standing tolerance for >5min with stable vital signs for carry over into toileting, functional tranfers and indep in ADLs   Visual/  Perceptual Glasses: reading-Present; WFL     Reports change in vision since admission: No      NA      Hand Dominance  [x] Right   [] Left     AROM (PROM) Strength Additional Info:  Goal:   RUE  WFL 4/5 good  and  FMC/dexterity noted during ADL tasks  Opposition [x] Intact [] Impaired  Finger to nose [x] Intact [] Impaired 5/5MMT generally for carry over into self care, functional transfers and functional mobility with AD. LUE WFL-rotator cuff PTA 4-/5 good  and  FMC/dexterity noted during ADL tasks  Opposition [x] Intact [] Impaired  Finger to nose [x] Intact [] Impaired 4+/5MMT generally for carry over into self care, functional transfers and functional mobility with AD.        Hearing: [facility]   Sensation: c/o numbness or tingling-neuropathy bilateral feet and finger tips  (at eval)  Tone: WFL   Edema: L LE       Comments: Patient cleared by nursing staff. Upon arrival pt seated on BSC. Pt agreeable to OT tx session, however states she will complete tasks when seated on BSC, as she states she \"may be awhile\" d/t having diarrhea. Pt educated with regards to AE for LE dressing 2* to L femur fx/ORIF, sitting balance/toileting, ECT's. At end of session pt seated on [facility]  with all needs including call light within reach. Overall, pt demonstrated decreased independence and safety during completion of ADL/functional transfers/mobility tasks 2* to pain and diarrhea and emesis. Pt would benefit from continued skilled OT to increase safety and independence with completion of ADL/IADL tasks for functional independence and quality of life. Pt required cues and education as noted above for safe facilitation and completion of tasks. Therapist provided skilled monitoring of patient's response during treatment session. Prior to and at the end of session, environmental modifications  completed for patients safety and efficiency of treatment session. Overall, patient demonstrates minimal/moderate difficulties with completion of BADLs and IADLs. Factors contributing to these difficulties include L ORIF, diarrhea, decreased endurance, and generalized weakness. As noted above, patient likely to benefit from further OT intervention to increase independence, safety, and overall quality of life. Treatment:     ADL completion: Self-care retraining for the above-mentioned ADLs; training on proper hand placement, safety technique, sequencing, and energy conservation techniques. Pt has made fair progress towards set goals    OT 1-3 days/wk for 2 weeks PRN     Treatment Time also includes thorough review of current medical information, gathering information on past medical history/social history and prior level of function, informal observation of tasks, assessment of data and education on plan of care and goals.     Treatment Time In: 2:30 PM Treatment Time Out: 2:49 PM            Treatment Charges: Mins Units   ADL/Home Mgt     [id number] [phone]   Thera Activities     [id number]       Ther Ex                 [id number]       Manual Therapy    [id number]     Neuro Re-ed         94161     Orthotic manage/training                               80572     Non Billable Time     Total Timed Treatment 19 2487 JANNETH Capone/SANTOS #[id number]

## 2022-12-20 NOTE — PLAN OF CARE
Problem: Chronic Conditions and Co-morbidities  Goal: Patient's chronic conditions and co-morbidity symptoms are monitored and maintained or improved  12/20/2022 1014 by Colette Bejarano RN  Outcome: Progressing  12/19/2022 2225 by Lopez Craemr RN  Outcome: Progressing     Problem: Discharge Planning  Goal: Discharge to home or other facility with appropriate resources  12/20/2022 1014 by Colette Bejarano RN  Outcome: Progressing  12/19/2022 2225 by Lopez Cramer RN  Outcome: Progressing     Problem: Pain  Goal: Verbalizes/displays adequate comfort level or baseline comfort level  12/20/2022 1014 by Colette Bejarano RN  Outcome: Progressing  12/19/2022 2225 by Lopez Cramer RN  Outcome: Progressing     Problem: Safety - Adult  Goal: Free from fall injury  12/20/2022 1014 by Colette Bejarano RN  Outcome: Progressing  12/19/2022 2225 by Lopez Cramer RN  Outcome: Progressing     Problem: ABCDS Injury Assessment  Goal: Absence of physical injury  12/20/2022 1014 by Colette Bejarano RN  Outcome: Progressing  12/19/2022 2225 by Lopez Cramer RN  Outcome: Progressing     Problem: Nutrition Deficit:  Goal: Optimize nutritional status  12/20/2022 1014 by Colette Bejarano RN  Outcome: Progressing  12/19/2022 2225 by Lopez Cramer RN  Outcome: Progressing     Problem: Musculoskeletal - Adult  Goal: Maintain proper alignment of affected body part  12/20/2022 1014 by Colette Bejarano RN  Outcome: Progressing  12/19/2022 2225 by Lopez Cramer RN  Outcome: Progressing

## 2022-12-21 VITALS
BODY MASS INDEX: 26.68 KG/M2 | HEIGHT: 67 IN | DIASTOLIC BLOOD PRESSURE: 66 MMHG | HEART RATE: 93 BPM | SYSTOLIC BLOOD PRESSURE: 119 MMHG | WEIGHT: 170 LBS | TEMPERATURE: 98.2 F | RESPIRATION RATE: 18 BRPM | OXYGEN SATURATION: 97 %

## 2022-12-21 LAB
ALBUMIN SERPL-MCNC: 3.1 G/DL (ref 3.5–5.2)
ALP BLD-CCNC: 74 U/L (ref 35–104)
ALT SERPL-CCNC: 8 U/L (ref 0–32)
ANION GAP SERPL CALCULATED.3IONS-SCNC: 15 MMOL/L (ref 7–16)
AST SERPL-CCNC: 13 U/L (ref 0–31)
BILIRUB SERPL-MCNC: 0.5 MG/DL (ref 0–1.2)
BUN BLDV-MCNC: 9 MG/DL (ref 6–20)
CALCIUM SERPL-MCNC: 9 MG/DL (ref 8.6–10.2)
CHLORIDE BLD-SCNC: 99 MMOL/L (ref 98–107)
CO2: 23 MMOL/L (ref 22–29)
CREAT SERPL-MCNC: 0.5 MG/DL (ref 0.5–1)
GFR SERPL CREATININE-BSD FRML MDRD: >60 ML/MIN/1.73
GLUCOSE BLD-MCNC: 175 MG/DL (ref 74–99)
HCT VFR BLD CALC: 30.6 % (ref 34–48)
HEMOGLOBIN: 10.1 G/DL (ref 11.5–15.5)
MAGNESIUM: 1.4 MG/DL (ref 1.6–2.6)
MCH RBC QN AUTO: 29.8 PG (ref 26–35)
MCHC RBC AUTO-ENTMCNC: 33 % (ref 32–34.5)
MCV RBC AUTO: 90.3 FL (ref 80–99.9)
METER GLUCOSE: 200 MG/DL (ref 74–99)
METER GLUCOSE: 224 MG/DL (ref 74–99)
PDW BLD-RTO: 14.4 FL (ref 11.5–15)
PLATELET # BLD: 355 E9/L (ref 130–450)
PMV BLD AUTO: 10.9 FL (ref 7–12)
POTASSIUM SERPL-SCNC: 3.8 MMOL/L (ref 3.5–5)
RBC # BLD: 3.39 E12/L (ref 3.5–5.5)
SODIUM BLD-SCNC: 137 MMOL/L (ref 132–146)
TOTAL PROTEIN: 5.9 G/DL (ref 6.4–8.3)
WBC # BLD: 11 E9/L (ref 4.5–11.5)

## 2022-12-21 PROCEDURE — 6370000000 HC RX 637 (ALT 250 FOR IP): Performed by: INTERNAL MEDICINE

## 2022-12-21 PROCEDURE — 83735 ASSAY OF MAGNESIUM: CPT

## 2022-12-21 PROCEDURE — 97530 THERAPEUTIC ACTIVITIES: CPT

## 2022-12-21 PROCEDURE — 6370000000 HC RX 637 (ALT 250 FOR IP): Performed by: PHYSICAL THERAPY ASSISTANT

## 2022-12-21 PROCEDURE — 2580000003 HC RX 258: Performed by: ORTHOPAEDIC SURGERY

## 2022-12-21 PROCEDURE — 6370000000 HC RX 637 (ALT 250 FOR IP): Performed by: ORTHOPAEDIC SURGERY

## 2022-12-21 PROCEDURE — 6360000002 HC RX W HCPCS: Performed by: INTERNAL MEDICINE

## 2022-12-21 PROCEDURE — 80053 COMPREHEN METABOLIC PANEL: CPT

## 2022-12-21 PROCEDURE — 6360000002 HC RX W HCPCS: Performed by: ORTHOPAEDIC SURGERY

## 2022-12-21 PROCEDURE — 85027 COMPLETE CBC AUTOMATED: CPT

## 2022-12-21 PROCEDURE — 36415 COLL VENOUS BLD VENIPUNCTURE: CPT

## 2022-12-21 PROCEDURE — 82962 GLUCOSE BLOOD TEST: CPT

## 2022-12-21 RX ORDER — POLYETHYLENE GLYCOL 3350 17 G/17G
17 POWDER, FOR SOLUTION ORAL DAILY PRN
Qty: 527 G | Refills: 1 | COMMUNITY
Start: 2022-12-21 | End: 2023-01-20

## 2022-12-21 RX ORDER — LOPERAMIDE HYDROCHLORIDE 2 MG/1
2 CAPSULE ORAL 3 TIMES DAILY PRN
COMMUNITY
Start: 2022-12-21 | End: 2022-12-31

## 2022-12-21 RX ORDER — PANTOPRAZOLE SODIUM 40 MG/1
40 TABLET, DELAYED RELEASE ORAL
Qty: 30 TABLET | Refills: 0 | Status: SHIPPED | OUTPATIENT
Start: 2022-12-22

## 2022-12-21 RX ORDER — BLOOD-GLUCOSE METER
1 KIT MISCELLANEOUS DAILY
Qty: 1 KIT | Refills: 0 | Status: SHIPPED | OUTPATIENT
Start: 2022-12-21

## 2022-12-21 RX ORDER — MAGNESIUM SULFATE IN WATER 40 MG/ML
4000 INJECTION, SOLUTION INTRAVENOUS ONCE
Status: COMPLETED | OUTPATIENT
Start: 2022-12-21 | End: 2022-12-21

## 2022-12-21 RX ORDER — METHOCARBAMOL 750 MG/1
750 TABLET, FILM COATED ORAL 4 TIMES DAILY PRN
Qty: 28 TABLET | Refills: 0 | Status: SHIPPED | OUTPATIENT
Start: 2022-12-21 | End: 2022-12-28

## 2022-12-21 RX ADMIN — METHOCARBAMOL 750 MG: 500 TABLET ORAL at 08:35

## 2022-12-21 RX ADMIN — INSULIN GLARGINE 32 UNITS: 100 INJECTION, SOLUTION SUBCUTANEOUS at 08:38

## 2022-12-21 RX ADMIN — PROCHLORPERAZINE EDISYLATE 5 MG: 5 INJECTION INTRAMUSCULAR; INTRAVENOUS at 08:34

## 2022-12-21 RX ADMIN — SUCRALFATE 1 G: 1 TABLET ORAL at 13:25

## 2022-12-21 RX ADMIN — MORPHINE SULFATE 4 MG: 4 INJECTION, SOLUTION INTRAMUSCULAR; INTRAVENOUS at 03:57

## 2022-12-21 RX ADMIN — PANTOPRAZOLE SODIUM 40 MG: 40 TABLET, DELAYED RELEASE ORAL at 06:10

## 2022-12-21 RX ADMIN — ACETAMINOPHEN 650 MG: 325 TABLET ORAL at 01:00

## 2022-12-21 RX ADMIN — ONDANSETRON 4 MG: 2 INJECTION INTRAMUSCULAR; INTRAVENOUS at 03:57

## 2022-12-21 RX ADMIN — ASPIRIN 81 MG 81 MG: 81 TABLET ORAL at 08:35

## 2022-12-21 RX ADMIN — SUCRALFATE 1 G: 1 TABLET ORAL at 06:10

## 2022-12-21 RX ADMIN — DOXAZOSIN 1 MG: 1 TABLET ORAL at 13:26

## 2022-12-21 RX ADMIN — ANASTROZOLE 1 MG: 1 TABLET ORAL at 13:25

## 2022-12-21 RX ADMIN — INSULIN LISPRO 2 UNITS: 100 INJECTION, SOLUTION INTRAVENOUS; SUBCUTANEOUS at 13:28

## 2022-12-21 RX ADMIN — ACETAMINOPHEN 650 MG: 325 TABLET ORAL at 13:25

## 2022-12-21 RX ADMIN — MORPHINE SULFATE 4 MG: 4 INJECTION, SOLUTION INTRAMUSCULAR; INTRAVENOUS at 10:58

## 2022-12-21 RX ADMIN — OXYCODONE 5 MG: 5 TABLET ORAL at 08:34

## 2022-12-21 RX ADMIN — OXYCODONE 5 MG: 5 TABLET ORAL at 00:59

## 2022-12-21 RX ADMIN — INSULIN LISPRO 2 UNITS: 100 INJECTION, SOLUTION INTRAVENOUS; SUBCUTANEOUS at 08:38

## 2022-12-21 RX ADMIN — PROCHLORPERAZINE EDISYLATE 5 MG: 5 INJECTION INTRAMUSCULAR; INTRAVENOUS at 00:59

## 2022-12-21 RX ADMIN — OXYCODONE 5 MG: 5 TABLET ORAL at 13:26

## 2022-12-21 RX ADMIN — LOPERAMIDE HYDROCHLORIDE 2 MG: 2 CAPSULE ORAL at 03:57

## 2022-12-21 RX ADMIN — ACETAMINOPHEN 650 MG: 325 TABLET ORAL at 06:10

## 2022-12-21 RX ADMIN — MAGNESIUM SULFATE HEPTAHYDRATE 4000 MG: 40 INJECTION, SOLUTION INTRAVENOUS at 08:40

## 2022-12-21 RX ADMIN — METHOCARBAMOL 750 MG: 500 TABLET ORAL at 13:26

## 2022-12-21 RX ADMIN — SODIUM CHLORIDE, PRESERVATIVE FREE 20 ML: 5 INJECTION INTRAVENOUS at 08:43

## 2022-12-21 ASSESSMENT — PAIN DESCRIPTION - LOCATION
LOCATION: HIP

## 2022-12-21 ASSESSMENT — PAIN DESCRIPTION - DESCRIPTORS
DESCRIPTORS: SORE;TENDER
DESCRIPTORS: ACHING;SORE;TENDER
DESCRIPTORS: ACHING;SORE;TENDER;THROBBING

## 2022-12-21 ASSESSMENT — PAIN SCALES - GENERAL
PAINLEVEL_OUTOF10: 6
PAINLEVEL_OUTOF10: 6
PAINLEVEL_OUTOF10: 9

## 2022-12-21 ASSESSMENT — PAIN DESCRIPTION - ORIENTATION
ORIENTATION: LEFT

## 2022-12-21 NOTE — PLAN OF CARE
Problem: Chronic Conditions and Co-morbidities  Goal: Patient's chronic conditions and co-morbidity symptoms are monitored and maintained or improved  12/21/2022 1005 by Darren Browning RN  Outcome: Progressing  12/21/2022 0134 by Jase Carolina RN  Outcome: Progressing     Problem: Discharge Planning  Goal: Discharge to home or other facility with appropriate resources  12/21/2022 1005 by Darren Browning RN  Outcome: Progressing  12/21/2022 0134 by Jase Carolina RN  Outcome: Progressing     Problem: Pain  Goal: Verbalizes/displays adequate comfort level or baseline comfort level  12/21/2022 1005 by Darren Browning RN  Outcome: Progressing  12/21/2022 0134 by Jase Carolina RN  Outcome: Progressing     Problem: Safety - Adult  Goal: Free from fall injury  12/21/2022 1005 by Darren Browning RN  Outcome: Progressing  12/21/2022 0134 by Jase Carolina RN  Outcome: Progressing     Problem: ABCDS Injury Assessment  Goal: Absence of physical injury  12/21/2022 1005 by Darren Browning RN  Outcome: Progressing  12/21/2022 0134 by Jase Carolina RN  Outcome: Progressing     Problem: Nutrition Deficit:  Goal: Optimize nutritional status  12/21/2022 1005 by Darren Browning RN  Outcome: Progressing  12/21/2022 0134 by Jase Carolina RN  Outcome: Progressing     Problem: Musculoskeletal - Adult  Goal: Maintain proper alignment of affected body part  12/21/2022 1005 by Darren Browning RN  Outcome: Progressing  12/21/2022 0134 by Jase Carolina RN  Outcome: Progressing

## 2022-12-21 NOTE — PROGRESS NOTES
OCCUPATIONAL THERAPY BEDSIDE TREATMENT NOTE   [facility]  [facility] [place]. [place]    Date:2022  Patient Name: Jaylyn Macias  MRN: [id number]  : 1975  Room: [address] [place] [address]        Evaluating OT: Yuan Us OTR/SANTOS; [id number]        Referring Provider and Orders/Date:    OT eval and treat  Start:  22 1415,   End:  22 1415,   ONE TIME,   Standing Count:  1 Occurrences,   R        Last continued at transfer on Fri Dec 16, 2022  8:30 PM    Lio Ribeiro DO         Diagnosis:   1. Hyperglycemia    2. Closed intertrochanteric fracture of hip, left, initial encounter ([facility] [address]    3. Closed displaced intertrochanteric fracture of left femur, initial encounter ([facility] [address]    4.  Intertrochanteric fracture of left hip, closed, initial encounter [facility])          Surgery: S/P L hip ORIF for IT fx - 22      Pertinent Medical History:       Past Medical History        Past Medical History:   Diagnosis Date    BRCA1 gene mutation positive       Patient put herself and it was postive per patient    Breast cancer ([facility] [address]      Cancer ([facility] [address]       Breast    Diabetes mellitus ([address]      Headache(784.0)       migraines    Hyperlipidemia      Hypertension      Kidney stone      Lumbar herniated disc 2012     x3    Migraine      Pancreatitis 2022     tx at [facility]             Past Surgical History         Past Surgical History:   Procedure Laterality Date    ANKLE SURGERY        BREAST BIOPSY        BREAST LUMPECTOMY        CHOLECYSTECTOMY        HYSTERECTOMY (CERVIX STATUS UNKNOWN)        LEEP         4 PROCEDURES    TIBIA FRACTURE SURGERY        TONSILLECTOMY        TUBAL LIGATION               Precautions:  Fall Risk, WBAT L LE    Recommended placement: [address] with family supervision/assist    Assessment of current deficits     [x] Functional mobility           [x]ADLs           [x] Strength                   []Cognition     [x] Functional transfers          [x] IADLs          [x] Safety Awareness   [x]Endurance     [] Fine Coordination                         [x] Balance      [] Vision/perception    []Sensation       [x]Gross Motor Coordination             [] ROM           [] Delirium                   [] Motor Control      OT PLAN OF CARE   OT POC based on physician orders, patient diagnosis and results of clinical assessment     Frequency/Duration 1-3 days/wk for 2 weeks PRN   Specific OT Treatment Interventions to include:   * Instruction/training on adapted ADL techniques and AE recommendations to increase functional independence within precautions       * Training on energy conservation strategies, correct breathing pattern and techniques to improve independence/tolerance for self-care routine  * Functional transfer/mobility training/DME recommendations for increased independence, safety, and fall prevention  * Patient/Family education to increase follow through with safety techniques and functional independence  * Recommendation of environmental modifications for increased safety with functional transfers/mobility and ADLs  * Therapeutic exercise to improve motor endurance, ROM, and functional strength for ADLs/functional transfers  * Therapeutic activities to facilitate/challenge dynamic balance, stand tolerance for increased safety and independence with ADLs  * Therapeutic activities to facilitate gross/fine motor skills for increased independence with ADLs  * Neuro-muscular re-education: facilitation of righting/equilibrium reactions, midline orientation, scapular stability/mobility, normalization of muscle tone, and facilitation of volitional active controled movement  * Positioning to improve skin integrity, interaction with environment and functional independence      Recommended Adaptive Equipment/DME: Tub transfer bench, bedside commode, front wheeled walker  Comment: pt provided with AE for LE dressing and leg  with good understanding and follow through      Home Living: Pt lives with spouse in a 1 story home with no steps to enter. Spouse is on disability d/t losing 2nd toe on R foot recently. Laundry on first floor. Can have additional social support if needed. Bathroom setup: tub shower and shower chair; standard toilet    DME owned: none      Prior Level of Function: indep with ADLs, indep with IADLs; ambulated indep   Driving: yes   Occupation: door dash   Enjoys: reading, playing video games, getting a new dog     Pain Level: 8/10 pain in L hip; nsg aware and provided pt with IV morphine prior to session  Cognition: A&O: 4/4; Follows 3 step directions              Memory:  Intact              Sequencing:  Intact              Problem solving:  Intact              Judgement/safety:  Intact     AM-PAC Daily Activity Inpatient   How much help for putting on and taking off regular lower body clothing?: A Lot (AE)  How much help for Bathing?: A Lot  How much help for Toileting?: A Little  How much help for putting on and taking off regular upper body clothing?: A Little  How much help for taking care of personal grooming?: A Little  How much help for eating meals?: None  AM-PAC Inpatient Daily Activity Raw Score: 17     Functional Assessment:      Initial Eval Status  Date: 12/18/2022   Treatment Status  Date:12/21/22 STGs = LTGs  Time frame: 10-14 days   Feeding Independent N/T  NA-PLOF   Grooming Stand by Assist and set up required from supine level N/T Independent    UB Dressing Maximal Assist with gown management from supine and sitting EOB Min A for gown management around back Indep   LB Dressing Dependent with socks from supine and sitting Min A/Mod A with use of AE for donning brief 2* to ORIF in LLE; ; good understanding and follow through with assist to thread brief over LLE;  states he will assist pt as needed Minimal Assist    Bathing Maximal Assist due to pain from supine level. Assist for LB and buttocks.  Pt was able to wash top of thighs. Pt education, demonstration only with use of DME in clinic for bathroom safety/safe transfers. Pt declined practicing transfer to tub d/t pain. Pt has received tub transfer bench; pt plans to sponge bathe until she is able to get into/out of tub; pt education on use of leg  to support LLE to/from tub if needed Minimal Assist    Toileting Dependent with jerez; Recommending 3:1 Pt education, demonstration and participation with use of DME in clinic for bathroom safety/safe transfers. Pt has transferred to/from [facility] multiple times in room; education on [facility] over toilet for height and handles/safety   Stand by Assist    Bed Mobility  Supine to sit: Moderate Assist   Sit to supine: Maximal Assist  For LB assist due to pain  Min A to support LLE to EOB from supine to sitting; scooting at min A with assist to support LLE; sit to supine at mod A with assist to support B LE's to supine d/t pain in LLE; positioning of LLE provided Supine to sit: Stand by Assist   Sit to supine: Stand by Assist    Functional Transfers Moderate Assist with walker from elevated surface of bed.  Not able to test chair or 3:1 due to pain reported by patient Min A for sit to stand transfers to/from EOB, to/from w/c and multiple surfaces in clinic with FWW; cuing for safety, sequencing; simulated car transfer at [facility] with pt able to support B LE's into car; mod I with use of leg  to support LLE out of car; transfer from w/c to EOB with min A ; use of FWW Stand by Assist    Functional Mobility Minimal Assist with walker for side stepping to [facility]  Min A progressing to SBA with FWW for household distances in room and clinic; no LOB noted; pt intermittently bearing weight through L toes only d/t pain; encouraged pt to WB through entire L foot Stand by Assist    Balance Sitting:     Static:  fair    Dynamic:fair-  Standing: poor+  Sitting:     Static:  fair    Dynamic:fair  Standing: fair with min A with FWW Sitting:     Static:  good    Dynamic:good  Standing: fair   Activity Tolerance Vitals with activity:room air with 98% Hr  with session; sitting EOB for 7min average and standing for 1min    Fair ; pt willing to  participate in therapy despite pain; pt's  states he is able to assist pt as needed Increase standing tolerance for >5min with stable vital signs for carry over into toileting, functional tranfers and indep in ADLs   Visual/  Perceptual Glasses: reading-Present; WFL     Reports change in vision since admission: No      NA      Hand Dominance  [x] Right   [] Left     [facility] [employer] [email or web address]   Sensation: c/o numbness or tingling-neuropathy bilateral feet and finger tips  (at eval)  Tone: WFL   Edema: L LE     Comments: Patient cleared by nursing staff. Upon arrival pt seated in bed.  present. Nsg provided pt with IV morphine at beginning of session. Pt educated with regards to bed mobility, transfer training, functional mobility, AE for LE dressing 2* to L femur fx/ORIF, car transfer, education on tub/toilet transfers in clinic, positioning, ECT's. At end of session pt seated in bed with positioning of  LLE with all needs including call light within reach. Overall, pt demonstrated fair/fair minus independence and safety during completion of ADL/functional transfers/mobility tasks 2* to pain. Pt would benefit from continued skilled OT to increase safety and independence with completion of ADL/IADL tasks for functional independence and quality of life. Pt required cues and education as noted above for safe facilitation and completion of tasks. Therapist provided skilled monitoring of patient's response during treatment session. Prior to and at the end of session, environmental modifications  completed for patients safety and efficiency of treatment session. Overall, patient demonstrates minimal/moderate difficulties with completion of BADLs and IADLs.  Factors contributing to these difficulties include L ORIF, pain, decreased endurance, and generalized weakness. As noted above, patient likely to benefit from further OT intervention to increase independence, safety, and overall quality of life. Treatment:     ADL completion: Self-care retraining for the above-mentioned ADLs; training on proper hand placement, safety technique, sequencing, and energy conservation techniques. AE for donning brief. Bed mobility: Facilitated bed mobility with cues for proper body mechanics and sequencing to prepare for ADL completion. Functional transfers: Facilitated transfers from various surfaces with cues for body alignment, safety and hand placement. Postural Balance: Sitting/standing balance retraining to improve righting reactions with postural changes during ADLs. Skilled positioning: Proper positioning to improve interaction with environment, overall functioning and decrease/prevent edema      Pt has made fair progress towards set goals    OT 1-3 days/wk for 2 weeks PRN     Treatment Time also includes thorough review of current medical information, gathering information on past medical history/social history and prior level of function, informal observation of tasks, assessment of data and education on plan of care and goals.     Treatment Time In: 11:00 AM    Treatment Time Out: 11:30 AM            Treatment Charges: Mins Units   ADL/Home Mgt     [id number] 5 0   Thera Activities     [id number]  25 2   Ther Ex                 [id number]       Manual Therapy    40430     Neuro Re-ed         81416     Orthotic manage/training                               [id number]     Non Billable Time     Total Timed Treatment 30 [address] [facility], [place]/L #[id number]

## 2022-12-21 NOTE — DISCHARGE SUMMARY
[id number] [facility] Physician Discharge Summary       Victor Hugo Álvarez DO  [address] [address]  [phone]    Schedule an appointment as soon as possible for a visit in 2 week(s)      Patti Anderson MD  [address] [place], [place] [address]Sridevi Mcknight [address]  [phone]    Schedule an appointment as soon as possible for a visit in 1 week(s)        Activity level: Weightbearing per orthopedic surgery, as in discharge instructions    Diet: ADULT DIET; Regular; 4 carb choices (60 gm/meal); Low Sodium (2 gm)  ADULT ORAL NUTRITION SUPPLEMENT; Lunch, Dinner; Low Calorie/High Protein Oral Supplement    Labs: Continue to check blood glucose 4 times daily and as needed for hypoglycemia. Other routine labs per primary care physician    Condition at discharge: Stable    Dispo: Home      Patient ID:  Varsha Greenwood  [id number]  41 y.o.  1975    Admit date: 12/16/2022    Discharge date and time:  12/21/2022  2:28 PM    Admission Diagnoses: Principal Problem:    Closed displaced intertrochanteric fracture of left femur ([address]  Active Problems:    Type 2 diabetes mellitus with hyperglycemia, with long-term current use of insulin (HCC)    Postoperative anemia    Diarrhea in adult patient    Hypokalemia    Primary hypertension    Hyperlipidemia with target LDL less than 70    GERD (gastroesophageal reflux disease)  Resolved Problems:    * No resolved hospital problems. *      Discharge Diagnoses: Principal Problem:    Closed displaced intertrochanteric fracture of left femur ([address]  Active Problems:    Type 2 diabetes mellitus with hyperglycemia, with long-term current use of insulin (HCC)    Postoperative anemia    Diarrhea in adult patient    Hypokalemia    Primary hypertension    Hyperlipidemia with target LDL less than 70    GERD (gastroesophageal reflux disease)  Resolved Problems:    * No resolved hospital problems.  *      Consults:  IP CONSULT TO ORTHOPEDIC SURGERY  IP CONSULT TO HOME CARE NEEDS    Procedures: ORIF of left femur fracture on 12/17/2022    Hospital Course: This is a 51-year-old female with a history significant for breast cancer, type 2 diabetes mellitus on long-term insulin, primary hypertension, hyperlipidemia, and GERD that was admitted to the hospital with a left femur fracture after mechanical fall. Patient had above-noted surgery. Postoperatively, she developed anemia but hemoglobin stabilized after close monitoring and she did not require PRBC transfusion. She was given IV Ferrlecit protocol. She initially experienced constipation so bowel regimen was initiated, but this was held after she developed diarrhea. Diarrhea resolved after holding bowel regimen. Insulin regimen adjusted for improved glycemic control. Initial plan was for subacute rehab, but patient decided to go home with assistive equipment. Stable for discharge home on 12/21/2022. DVT prophylaxis per orthopedic surgery with aspirin twice daily. Discussed seeking immediate medical attention for worsening lower extremity pain, swelling, shortness of breath, melena, hematochezia, etc.  She states understanding. She will follow-up with her primary care physician and orthopedic surgery. Also to note, patient's blood pressure was soft postoperatively so lisinopril was held. Blood pressure was within normal limits, but low end of normal.  Hold lisinopril until further instruction from primary care physician (she will likely need to resume and be on this long-term).     Discharge Exam:  Vitals:    12/21/22 0451 12/21/22 0834 12/21/22 1058 12/21/22 1325   BP: 107/67   119/66   Pulse: 93      Resp: 18 18 18 18   Temp: 98.2 °F (36.8 °C)      TempSrc: Oral      SpO2: 97%      Weight:       Height:           General Appearance: alert and oriented to person, place and time, appears to be in good spirits today  Skin: warm and dry  Head: normocephalic and atraumatic  Eyes: pupils equal, round, and reactive to light, extraocular eye movements intact, conjunctivae normal  Neck: neck supple and non tender without mass   Pulmonary/Chest: Nonlabored on room air. Clear to auscultation bilaterally  Cardiovascular: normal rate, normal S1 and S2 and no carotid bruits  Abdomen: Normal bowel sounds. Soft, nontender, nondistended, no palpable masses organomegaly  Extremities: Trace bilateral pedal edema. No calf tenderness. No cyanosis or clubbing. Left thigh bandage in place. Neurologic: no cranial nerve deficit and speech normal  I/O last 3 completed shifts: In: 120 [P.O.:120]  Out: 1850 [Urine:1850]  I/O this shift: In: 720 [P.O.:720]  Out: -       LABS:  Recent Labs     12/19/22  0351 12/20/22  0354 12/21/22  0409    137 137   K 3.8 3.3* 3.8    101 99   CO2 27 27 23   BUN 7 7 9   CREATININE 0.5 0.5 0.5   GLUCOSE 230* 116* 175*   CALCIUM 8.3* 8.8 9.0       Recent Labs     12/19/22  0351 12/20/22  0354 12/21/22  0409   WBC 8.0 8.6 11.0   RBC 2.78* 2.87* 3.39*   HGB 8.3* 8.6* 10.1*   HCT 25.3* 26.5* 30.6*   MCV 91.0 92.3 90.3   MCH 29.9 30.0 29.8   MCHC 32.8 32.5 33.0   RDW 13.8 13.8 14.4    256 355   MPV 11.8 10.7 10.9         Imaging:  XR HIP LEFT (2-3 VIEWS)    Result Date: 12/16/2022  EXAMINATION: TWO XRAY VIEWS OF THE LEFT HIP;   XRAY VIEWS OF THE LEFT FEMUR 12/16/2022 12:07 pm COMPARISON: None. HISTORY: ORDERING SYSTEM PROVIDED HISTORY: left hip pain TECHNOLOGIST PROVIDED HISTORY: Reason for exam:->left hip pain FINDINGS: Mildly comminuted and displaced intertrochanteric fracture at the left femur. Intact pelvis. No significant hip or SI joint arthropathy. Normal soft tissues. Mildly displaced and comminuted intertrochanteric fracture at the left femur. XR FEMUR LEFT (MIN 2 VIEWS)    Result Date: 12/16/2022  EXAMINATION: TWO XRAY VIEWS OF THE LEFT HIP;   XRAY VIEWS OF THE LEFT FEMUR 12/16/2022 12:07 pm COMPARISON: None.  HISTORY: ORDERING SYSTEM PROVIDED HISTORY: left hip pain TECHNOLOGIST PROVIDED HISTORY: Reason for exam:->left hip pain FINDINGS: Mildly comminuted and displaced intertrochanteric fracture at the left femur. Intact pelvis. No significant hip or SI joint arthropathy. Normal soft tissues. Mildly displaced and comminuted intertrochanteric fracture at the left femur. CT HEAD WO CONTRAST    Result Date: 12/16/2022  EXAMINATION: CT OF THE HEAD WITHOUT CONTRAST; CT OF THE CERVICAL SPINE WITHOUT CONTRAST 12/16/2022 12:39 pm TECHNIQUE: CT of the head was performed without the administration of intravenous contrast. Automated exposure control, iterative reconstruction, and/or weight based adjustment of the mA/kV was utilized to reduce the radiation dose to as low as reasonably achievable.; CT of the cervical spine was performed without the administration of intravenous contrast. Multiplanar reformatted images are provided for review. Automated exposure control, iterative reconstruction, and/or weight based adjustment of the mA/kV was utilized to reduce the radiation dose to as low as reasonably achievable. COMPARISON: None. HISTORY: ORDERING SYSTEM PROVIDED HISTORY: fall TECHNOLOGIST PROVIDED HISTORY: Reason for exam:->fall Has a \"code stroke\" or \"stroke alert\" been called? ->No Decision Support Exception - unselect if not a suspected or confirmed emergency medical condition->Emergency Medical Condition (MA); ORDERING SYSTEM FINDINGS: Brain CT scan: No evidence of parenchymal hemorrhages or contusions. No evidence of intra or extra-axial fluid collection is seen. Areas of low attenuation visualized in the periventricular and subcortical white matter demonstrate no significant change in comparison to the prior study consistent with mild chronic microvascular disease. Prominence of the ventricles and sulci is visualized demonstrating no change consistent with chronic atrophic brain changes unremarkable for the patient's age.  No evidence of intracranial mass or mass effect, no evidence of midline shift is seen. No evidence of sellar or parasellar mass is seen. The visualized portion of the orbits demonstrate no acute abnormality. The visualized paranasal sinuses and mastoid air cells demonstrate no acute abnormality. No acute abnormality of the visualized skull. Mild soft tissue prominence visualized in the right frontal scalp subcutaneous soft tissues. Cervical spine CT scan: Straightening of the alignment of the columns of the cervical spine is visualized. No evidence of compression deformity of the cervical vertebral bodies. Multilevel degenerative endplate changes visualized with decreased intervertebral disc height visualized most prominent at C4-C5. Mild degenerative disc osteophyte complex, uncovertebral disease and hypertrophic changes of the facet joints is visualized. No abnormal density visualized within the spinal canal. The prevertebral soft tissues are unremarkable. Limited evaluation the upper lung fields is unremarkable. No acute intracranial abnormality. No acute osseous abnormality of the cervical spine. Degenerative changes of the cervical spine. CT CSpine W/O Contrast    Result Date: 12/16/2022  EXAMINATION: CT OF THE HEAD WITHOUT CONTRAST; CT OF THE CERVICAL SPINE WITHOUT CONTRAST 12/16/2022 12:39 pm TECHNIQUE: CT of the head was performed without the administration of intravenous contrast. Automated exposure control, iterative reconstruction, and/or weight based adjustment of the mA/kV was utilized to reduce the radiation dose to as low as reasonably achievable.; CT of the cervical spine was performed without the administration of intravenous contrast. Multiplanar reformatted images are provided for review. Automated exposure control, iterative reconstruction, and/or weight based adjustment of the mA/kV was utilized to reduce the radiation dose to as low as reasonably achievable. COMPARISON: None.  HISTORY: ORDERING SYSTEM PROVIDED HISTORY: fall TECHNOLOGIST PROVIDED HISTORY: Reason for exam:->fall Has a \"code stroke\" or \"stroke alert\" been called? ->No Decision Support Exception - unselect if not a suspected or confirmed emergency medical condition->Emergency Medical Condition (MA); ORDERING SYSTEM FINDINGS: Brain CT scan: No evidence of parenchymal hemorrhages or contusions. No evidence of intra or extra-axial fluid collection is seen. Areas of low attenuation visualized in the periventricular and subcortical white matter demonstrate no significant change in comparison to the prior study consistent with mild chronic microvascular disease. Prominence of the ventricles and sulci is visualized demonstrating no change consistent with chronic atrophic brain changes unremarkable for the patient's age. No evidence of intracranial mass or mass effect, no evidence of midline shift is seen. No evidence of sellar or parasellar mass is seen. The visualized portion of the orbits demonstrate no acute abnormality. The visualized paranasal sinuses and mastoid air cells demonstrate no acute abnormality. No acute abnormality of the visualized skull. Mild soft tissue prominence visualized in the right frontal scalp subcutaneous soft tissues. Cervical spine CT scan: Straightening of the alignment of the columns of the cervical spine is visualized. No evidence of compression deformity of the cervical vertebral bodies. Multilevel degenerative endplate changes visualized with decreased intervertebral disc height visualized most prominent at C4-C5. Mild degenerative disc osteophyte complex, uncovertebral disease and hypertrophic changes of the facet joints is visualized. No abnormal density visualized within the spinal canal. The prevertebral soft tissues are unremarkable. Limited evaluation the upper lung fields is unremarkable. No acute intracranial abnormality. No acute osseous abnormality of the cervical spine. Degenerative changes of the cervical spine.      Fluoro For Surgical Procedures    Result Date: 12/16/2022  EXAMINATION: SPOT FLUOROSCOPIC IMAGES 12/16/2022 6:48 pm TECHNIQUE: Fluoroscopy was provided by the radiology department for procedure. Radiologist was not present during examination. FLUOROSCOPY DOSE AND TYPE OR TIME AND EXPOSURES: 4 images FLUOROSCOPY TIME: 31.1 seconds COMPARISON: Pelvis/hip series from December 16, 2022 HISTORY: ORDERING SYSTEM PROVIDED HISTORY: Closed displaced intertrochanteric fracture of left femur, initial encounter [facility]) TECHNOLOGIST PROVIDED HISTORY: Reason for exam:->Closed displaced intertrochanteric fracture of left femur, initial encounter ([facility] [address] Intraprocedural imaging. FINDINGS: 4 spot images of the left hip were obtained. Fluoroscopic support provided to subspecialty service for ORIF of the left hip. No obvious complication on the images provided. Please see subspecialty report for full details and interpretation of real time imaging. Intraprocedural fluoroscopic spot images as above. See separate procedure report for more information. Patient Instructions:   Current Discharge Medication List        START taking these medications    Details   glucose monitoring (FREESTYLE FREEDOM) kit 1 kit by Does not apply route daily  Qty: 1 kit, Refills: 0      loperamide (IMODIUM) 2 MG capsule Take 1 capsule by mouth 3 times daily as needed for Diarrhea      polyethylene glycol (GLYCOLAX) 17 g packet Take 17 g by mouth daily as needed for Constipation  Qty: 527 g, Refills: 1      methocarbamol (ROBAXIN) 750 MG tablet Take 1 tablet by mouth 4 times daily as needed (muscle pain, cramps)  Qty: 28 tablet, Refills: 0      pantoprazole (PROTONIX) 40 MG tablet Take 1 tablet by mouth every morning (before breakfast)  Qty: 30 tablet, Refills: 0      oxyCODONE-acetaminophen (PERCOCET) 5-325 MG per tablet Take 1 tablet by mouth every 6 hours as needed for Pain for up to 7 days. Intended supply: 7 days.  Take lowest dose possible to manage pain  Qty: 28 tablet, Refills: 0    Comments: Reduce doses taken as pain becomes manageable  Associated Diagnoses: Intertrochanteric fracture of left hip, closed, initial encounter ([facility] [address]      aspirin EC 81 MG EC tablet Take 1 tablet by mouth 2 times daily for 28 days  Qty: 56 tablet, Refills: 0           CONTINUE these medications which have NOT CHANGED    Details   doxazosin (CARDURA) 1 MG tablet       Dulaglutide 1.5 MG/0.5ML SOPN Inject 1.5 mg into the skin once a week  Qty: 2 pen, Refills: 1    Associated Diagnoses: Type 2 diabetes mellitus without complication, with long-term current use of insulin ([facility])      Insulin Degludec (TRESIBA FLEXTOUCH) 100 UNIT/ML SOPN Inject 36 Units into the skin in the morning and at bedtime  Qty: 6 pen, Refills: 1    Associated Diagnoses: Type 2 diabetes mellitus without complication, with long-term current use of insulin ([facility])      Insulin Pen Needle 32G X 6 MM MISC 1 Device by Does not apply route daily  Qty: 100 each, Refills: 12    Associated Diagnoses: Type 2 diabetes mellitus with hyperglycemia, with long-term current use of insulin ([facility])      atorvastatin (LIPITOR) 40 MG tablet take 1 tablet by mouth once daily  Qty: 90 tablet, Refills: 1    Associated Diagnoses: Hyperlipidemia, unspecified hyperlipidemia type      metFORMIN (GLUCOPHAGE) 1000 MG tablet Take 1 tablet by mouth 2 times daily (with meals)  Qty: 180 tablet, Refills: 1    Associated Diagnoses: Type 2 diabetes mellitus without complication, with long-term current use of insulin ([facility])      tamoxifen (NOLVADEX) 20 MG tablet Take 20 mg by mouth nightly      B-D INS SYRINGE 0.5CC/31GX5/16 31G X 5/16\" 0.5 ML MISC Refills: 0      ONE TOUCH LANCETS MISC Check blood sugar bid  Qty: 100 each, Refills: 12    Comments: Dx: E11.65  Associated Diagnoses: Type 2 diabetes mellitus with hyperglycemia ([facility])           STOP taking these medications       anastrozole (ARIMIDEX) 1 MG tablet Comments:   Reason for Stopping:         sucralfate (CARAFATE) 1 GM/10ML suspension Comments:   Reason for Stopping:         ondansetron (ZOFRAN) 4 MG tablet Comments:   Reason for Stopping:         omeprazole (PRILOSEC) 20 MG delayed release capsule Comments:   Reason for Stopping:         lisinopril (PRINIVIL;ZESTRIL) 20 MG tablet Comments:   Reason for Stopping:                 Note that greater than 30 minutes was spent in preparing discharge papers, discussing discharge with patient, medication review, etc.    NOTE: This report was transcribed using voice recognition software. Every effort was made to ensure accuracy; however, inadvertent computerized transcription errors may be present.      Signed:  Electronically signed by Madi Armstrong DO on 12/21/2022 at 2:28 PM

## 2022-12-21 NOTE — PROGRESS NOTES
Physical Therapy Treatment Note/Plan of Care    Room #:  9544/1441-61  Patient Name: Tawnya Keene  YOB: 1975  MRN: [id number]    Date of Service: 12/21/2022     Tentative placement recommendation: Subacute rehab or Home Health Physical Therapy with family assist  Equipment recommendation: To be determined      Evaluating Physical Therapist: Sherrill Sanders, [facility] #[id number]     Specific Provider Orders/Date/Referring Provider :   12/16/22 1415    PT eval and treat  Start:  12/16/22 1415,   End:  12/16/22 1415,   ONE TIME,   Standing Count:  1 Occurrences,   R        Last continued at transfer on Fri Dec 16, 2022  8:30 PM    Oskar Joshi DO     Admitting Diagnosis:   Hyperglycemia [R73.9]  Closed displaced intertrochanteric fracture of left femur, initial encounter [facility]) [S72.142A]  Closed intertrochanteric fracture of hip, left, initial encounter [facility]) [S72.142A]  Intertrochanteric fracture of left hip, closed, initial encounter ([facility] [S72.142A]   Visit diagnosis:     DATE OF PROCEDURE:  12/16/2022    PREOPERATIVE DIAGNOSIS:  Closed, displaced intertrochanteric fracture,    left hip. POSTOPERATIVE DIAGNOSIS:  Closed, displaced intertrochanteric fracture,left hip. OPERATION PERFORMED:  Open reduction and internal fixation of left hip fracture with interlocking gamma nail.     SURGEON:  Sixto Arellano DO      Visit Diagnoses         Codes    Hyperglycemia    -  Primary R73.9    Closed intertrochanteric fracture of hip, left, initial encounter [facility])     S72.142A    Closed displaced intertrochanteric fracture of left femur, initial encounter [facility])     S72.142A            Patient Active Problem List   Diagnosis    Primary hypertension    Migraines, neuralgic    Hyperlipidemia with target LDL less than 70    Tobacco abuse    GERD (gastroesophageal reflux disease)    Malignant tumor of breast ([address]    Malignant neoplasm of lower-outer quadrant of right female breast ([address]    Status post chemoradiation    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 FWB on LLE. L Hip ORIF. Pt increased gait distance. Assistance with exercises due to pain. Assisted up in chair at end of session. The patient will benefit from continued skilled therapy to increase strength and improve balance for safe functional mobility, to decrease risk of falls, and to meet goals at discharge. PHYSICAL THERAPY  PLAN OF CARE       Physical therapy plan of care is established based on physician order,  patient diagnosis and clinical assessment    Current Treatment Recommendations:    -Bed Mobility: Lower extremity exercises , Upper extremity exercises , and Trunk control activities   -Sitting Balance: Incorporate reaching activities to activate trunk muscles , Hands on support to maintain midline , and Facilitate active trunk muscle engagement   -Standing Balance: Perform strengthening exercises in standing to promote motor control with or without upper extremity support   -Transfers: Provide instruction on proper hand and foot position for adequate transfer of weight onto lower extremities and use of gait device if needed and Cues for hand placement, technique and safety. Provide stabilization to prevent fall   -Gait: Gait training and Standing activities to improve: base of support, weight shift, weight bearing      PT long term treatment goals are located in below grid    Patient and or family understand(s) diagnosis, prognosis, and plan of care.     Frequency of treatments: Patient will be seen  twice daily  for therapeutic exercise, functional retraining, endurance activities, balance exercises, family and patient education.        Prior Level of Function: Patient ambulated independently   Rehab Potential: good for baseline     Past medical history:   Past Medical History:   Diagnosis Date    BRCA1 gene mutation positive     Patient put herself and it was postive per patient    Breast cancer ([address]     Cancer ([address]     Breast    Diabetes mellitus ([address]     Headache(784.0)     migraines    Hyperlipidemia     Hypertension     Kidney stone     Lumbar herniated disc 2012    x3    Migraine     Pancreatitis 07/2022    tx at [facility]     Past Surgical History:   Procedure Laterality Date    ANKLE SURGERY      BREAST BIOPSY      BREAST LUMPECTOMY      CHOLECYSTECTOMY      HIP FRACTURE SURGERY Left 12/16/2022    LEFT HIP OPEN REDUCTION INTERNAL FIXATION (SHARATH TABLE) performed by Hawk Thompson DO at [address] (CERVIX STATUS UNKNOWN)      LEEP      4 PROCEDURES    TIBIA FRACTURE SURGERY      TONSILLECTOMY      TUBAL LIGATION           SUBJECTIVE:    Precautions:  up with assistance , L Hip ORIF (WBAT on LLE), pain     Social history: Patient lives with spouse in a ranch home  with No steps  to enter  Tub shower      Equipment owned: [address] [facility] [employer] chair     [address] [facility] [address] [address]   How much difficulty turning over in bed?: A Little  How much difficulty sitting down on / standing up from a chair with arms?: A Little  How much difficulty moving from lying on back to sitting on side of bed?: A Little  How much help from another person moving to and from a bed to a chair?: A Little  How much help from another person needed to walk in hospital room?: A Lot  How much help from another person for climbing 3-5 steps with a railing?: Total  AM-PAC Inpatient Mobility Raw Score : 15  AM-PAC Inpatient T-Scale Score : 39.45  Mobility Inpatient CMS 0-100% Score: 57.7  Mobility Inpatient CMS G-Code Modifier : CK    Nursing cleared patient for PT treatment. OBJECTIVE:   Initial Evaluation  Date: 12/17/2022 Treatment Date:  12/21/2022   Short Term/ Long Term   Goals   Was pt agreeable to Eval/treatment? Yes Yes     Pain Level  8/10   left lower extremity  7/10  Left lower extremity      Bed Mobility  Rolling: attempted but unable to roll completely in either direction due to pain     Supine to sit: Maximal assist of 1    Sit to supine: Maximal assist of  2    Scooting: Maximal assist of 1   Rolling: Not assessed    Supine to sit: Minimal assist of 1   Sit to supine: Not assessed patient in chair   Scooting: Minimal assist of 1    Rolling: Independent    Supine to sit: Independent    Sit to supine: Independent    Scooting: Independent     Transfers Sit to stand: Not assessed  due to pain Sit to stand:  min assist    Sit to stand: Min A   Ambulation    not assessed  2x12 feet using wheeled walker with min assist. Cues needed for walker control, sequencing, balance, safety.   25 feet using  wheeled walker with Min A   Stair negotiation: ascended and descended   Not assessed        ROM Within functional limits except Left hip   Increase range of motion 10% of affected joints    Strength Within functional limits  except  Left lower extremity   Within functional limits   Balance Sitting EOB:  fair posterior lean and off loading left hip   Dynamic Standing: na Sitting EOB: fair +  Dynamic Standing: fair wheeled walker    Sitting EOB:  good    Dynamic Standing:  fair wheeled walker      Patient is Alert & Oriented x person, place, time, and situation and follows directions    Sensation:  Patient reports neuropathy bilateral feet    Edema:  yes left lower extremity   Vitals:room air   Blood Pressure at rest   Blood Pressure during session     Heart Rate at rest  Heart Rate during session    SPO2 at rest %  SPO2 during session %     Patient education  Patient educated on role of Physical Therapy, risks of immobility, safety and plan of care,  importance of mobility while in hospital , ankle pumps, quad set and glut set for edema control, blood clot prevention, and safety   weight bearing status. Patient response to education:   Pt verbalized understanding Pt demonstrated skill Pt requires further education in this area   Yes Partial Yes       Treatment:  Patient practiced and was instructed in the following treatment:     supine exercise. Educated on bed mobility, transfers and gait. Sat edge of bed 5 minutes with Minimal assist of 1 to increase dynamic sitting balance and activity tolerance. Stood, and ambulated to door and back to chair. AP, quad sets and LAQ x 15 bilateral LE. Patient did have one sharp pain in LLE while performing LAQ on RLE. At end of session, patient in chair with  call light and phone within reach,  all lines and tubes intact, nursing notified. Patient would benefit from skilled Home Physical Therapy to improve functional independence and quality of life. Patient's/ family goals   home     Patient and or family understand(s) diagnosis, prognosis, and plan of care.       Time in  0900  Time out  0918    Total Treatment Time   18 minutes      CPT codes:    Therapeutic activities (55793)   12 minutes  1 unit(s)  Therapeutic exercises (88545)   6 minutes  0 unit(s)    [place], [address] #[id number]

## 2022-12-27 ENCOUNTER — TELEPHONE (OUTPATIENT)
Dept: PRIMARY CARE CLINIC | Age: 47
End: 2022-12-27

## 2022-12-27 RX ORDER — METHOCARBAMOL 750 MG/1
750 TABLET, FILM COATED ORAL 4 TIMES DAILY PRN
Qty: 28 TABLET | Refills: 0 | Status: CANCELLED | OUTPATIENT
Start: 2022-12-27 | End: 2023-01-03

## 2022-12-27 RX ORDER — METHOCARBAMOL 750 MG/1
750 TABLET, FILM COATED ORAL 3 TIMES DAILY
Qty: 21 TABLET | Refills: 0 | Status: SHIPPED | OUTPATIENT
Start: 2022-12-27 | End: 2023-01-03

## 2022-12-27 NOTE — TELEPHONE ENCOUNTER
Pt states received these from surgeon after fracture of hip wants a refill but surgeon will not refill them

## 2023-01-03 RX ORDER — METHOCARBAMOL 750 MG/1
750 TABLET, FILM COATED ORAL 3 TIMES DAILY
Qty: 21 TABLET | Refills: 0 | Status: SHIPPED | OUTPATIENT
Start: 2023-01-03 | End: 2023-01-10

## 2023-01-05 ENCOUNTER — TELEPHONE (OUTPATIENT)
Dept: PRIMARY CARE CLINIC | Age: 48
End: 2023-01-05

## 2023-01-05 NOTE — TELEPHONE ENCOUNTER
----- Message from Michael Nuñez sent at 1/5/2023 10:20 AM EST -----  Subject: Message to Provider    QUESTIONS  Information for Provider? Pt has appt with Dr. Gabriella Alberto on 1/11/23 at 11am   and is requesting to have visit be virtual due to recovering from hip   surgery. Pt will be in [place] at time and prefers link sent via Doxy to   [phone]  ---------------------------------------------------------------------------  --------------  Daryle Haver INFO  [phone]; OK to leave message on voicemail, OK to respond with   electronic message via [employer] portal (only for patients who have   registered [employer] account)  ---------------------------------------------------------------------------  --------------  SCRIPT ANSWERS  Relationship to Patient?  Self

## 2023-01-06 DIAGNOSIS — E11.9 TYPE 2 DIABETES MELLITUS WITHOUT COMPLICATION, WITH LONG-TERM CURRENT USE OF INSULIN (HCC): ICD-10-CM

## 2023-01-06 DIAGNOSIS — Z79.4 TYPE 2 DIABETES MELLITUS WITHOUT COMPLICATION, WITH LONG-TERM CURRENT USE OF INSULIN (HCC): ICD-10-CM

## 2023-01-06 RX ORDER — INSULIN DEGLUDEC INJECTION 100 U/ML
36 INJECTION, SOLUTION SUBCUTANEOUS 2 TIMES DAILY
Qty: 6 ADJUSTABLE DOSE PRE-FILLED PEN SYRINGE | Refills: 2 | Status: SHIPPED | OUTPATIENT
Start: 2023-01-06

## 2023-01-11 ENCOUNTER — TELEMEDICINE (OUTPATIENT)
Dept: PRIMARY CARE CLINIC | Age: 48
End: 2023-01-11
Payer: COMMERCIAL

## 2023-01-11 ENCOUNTER — TELEPHONE (OUTPATIENT)
Dept: PRIMARY CARE CLINIC | Age: 48
End: 2023-01-11

## 2023-01-11 DIAGNOSIS — E11.9 TYPE 2 DIABETES MELLITUS WITHOUT COMPLICATION, WITH LONG-TERM CURRENT USE OF INSULIN (HCC): ICD-10-CM

## 2023-01-11 DIAGNOSIS — K21.9 GASTROESOPHAGEAL REFLUX DISEASE WITHOUT ESOPHAGITIS: ICD-10-CM

## 2023-01-11 DIAGNOSIS — E11.9 TYPE 2 DIABETES MELLITUS WITHOUT COMPLICATION, WITHOUT LONG-TERM CURRENT USE OF INSULIN (HCC): ICD-10-CM

## 2023-01-11 DIAGNOSIS — Z79.4 TYPE 2 DIABETES MELLITUS WITHOUT COMPLICATION, WITH LONG-TERM CURRENT USE OF INSULIN (HCC): ICD-10-CM

## 2023-01-11 DIAGNOSIS — E78.5 HYPERLIPIDEMIA, UNSPECIFIED HYPERLIPIDEMIA TYPE: ICD-10-CM

## 2023-01-11 DIAGNOSIS — S72.142D CLOSED DISPLACED INTERTROCHANTERIC FRACTURE OF LEFT FEMUR WITH ROUTINE HEALING, SUBSEQUENT ENCOUNTER: Primary | ICD-10-CM

## 2023-01-11 PROCEDURE — 99422 OL DIG E/M SVC 11-20 MIN: CPT | Performed by: STUDENT IN AN ORGANIZED HEALTH CARE EDUCATION/TRAINING PROGRAM

## 2023-01-11 RX ORDER — METHOCARBAMOL 750 MG/1
750 TABLET, FILM COATED ORAL 3 TIMES DAILY
Qty: 90 TABLET | Refills: 0 | Status: SHIPPED | OUTPATIENT
Start: 2023-01-11 | End: 2023-02-10

## 2023-01-11 RX ORDER — GLUCOSAMINE HCL/CHONDROITIN SU 500-400 MG
CAPSULE ORAL
Qty: 100 STRIP | Refills: 0 | Status: SHIPPED | OUTPATIENT
Start: 2023-01-11

## 2023-01-11 RX ORDER — INSULIN DEGLUDEC INJECTION 100 U/ML
36 INJECTION, SOLUTION SUBCUTANEOUS 2 TIMES DAILY
Qty: 6 ADJUSTABLE DOSE PRE-FILLED PEN SYRINGE | Refills: 2 | Status: SHIPPED
Start: 2023-01-11 | End: 2023-01-11

## 2023-01-11 RX ORDER — BLOOD-GLUCOSE METER
1 KIT MISCELLANEOUS DAILY
Qty: 1 KIT | Refills: 0 | Status: SHIPPED | OUTPATIENT
Start: 2023-01-11 | End: 2023-02-10

## 2023-01-11 RX ORDER — LANCETS 30 GAUGE
1 EACH MISCELLANEOUS 2 TIMES DAILY
Qty: 300 EACH | Refills: 1 | Status: SHIPPED | OUTPATIENT
Start: 2023-01-11

## 2023-01-11 RX ORDER — PANTOPRAZOLE SODIUM 40 MG/1
40 TABLET, DELAYED RELEASE ORAL
Qty: 90 TABLET | Refills: 0 | Status: SHIPPED | OUTPATIENT
Start: 2023-01-11 | End: 2023-04-11

## 2023-01-11 RX ORDER — INSULIN GLARGINE 100 [IU]/ML
36 INJECTION, SOLUTION SUBCUTANEOUS 2 TIMES DAILY
Qty: 6 ADJUSTABLE DOSE PRE-FILLED PEN SYRINGE | Refills: 3 | Status: SHIPPED | OUTPATIENT
Start: 2023-01-11 | End: 2023-02-10

## 2023-01-11 RX ORDER — ATORVASTATIN CALCIUM 40 MG/1
TABLET, FILM COATED ORAL
Qty: 90 TABLET | Refills: 1 | Status: SHIPPED | OUTPATIENT
Start: 2023-01-11

## 2023-01-11 ASSESSMENT — ENCOUNTER SYMPTOMS: RESPIRATORY NEGATIVE: 1

## 2023-01-11 NOTE — PROGRESS NOTES
Elina José (:  1975) is a Established patient, here for evaluation of the following:    Assessment & Plan   Below is the assessment and plan developed based on review of pertinent history, physical exam, labs, studies, and medications. 1. Closed displaced intertrochanteric fracture of left femur with routine healing, subsequent encounter  -     methocarbamol (ROBAXIN) 750 MG tablet; Take 1 tablet by mouth 3 times daily, Disp-90 tablet, R-0Normal  2. Type 2 diabetes mellitus without complication, without long-term current use of insulin (HCC)  -     glucose monitoring (FREESTYLE FREEDOM) kit; DAILY Starting 2023, Until Fri 2/10/2023, For 30 days, Disp-1 kit, R-0, NormalPlease dispense test glucometer that is covered by insurance  -     blood glucose monitor strips; Test 2 times a day & as needed for symptoms of irregular blood glucose. Dispense sufficient amount for indicated testing frequency plus additional to accommodate PRN testing needs. Please dispense what is covered by insurance, Disp-100 strip, R-0, Normal  3. Gastroesophageal reflux disease without esophagitis  -     pantoprazole (PROTONIX) 40 MG tablet; Take 1 tablet by mouth every morning (before breakfast), Disp-90 tablet, R-0Normal  4. Type 2 diabetes mellitus without complication, with long-term current use of insulin (HCC)  -     metFORMIN (GLUCOPHAGE) 1000 MG tablet; Take 1 tablet by mouth 2 times daily (with meals), Disp-180 tablet, R-1Normal  -     dulaglutide (TRULICITY) 1.5 RP/5.5UV SC injection; Inject 0.5 mLs into the skin once a week, Disp-2 mL, R-2Normal  -     Insulin Degludec (TRESIBA FLEXTOUCH) 100 UNIT/ML SOPN; Inject 36 Units into the skin in the morning and at bedtime, Disp-6 Adjustable Dose Pre-filled Pen Syringe, R-2Normal  -     Lancets MISC; 2 TIMES DAILY Starting 2023, Disp-300 each, R-1, Normal  5.  Hyperlipidemia, unspecified hyperlipidemia type  -     atorvastatin (LIPITOR) 40 MG tablet; take 1 tablet by mouth once daily, Disp-90 tablet, R-1Normal  No follow-ups on file. Will send in a new glucometer and advised patient to try to test 2 times a day and then after a week, to send readings in and will adjust medication as needed      Subjective   HPI    Patient is a 42 y/o F with a PMHx of breast cancer, T2DM, GERD and recent L femur fracture who presents via [address] [place] [address] for follow up. She suffered a mechanical fall on 12/16 and suffered in intertrochanteric femur fracture s/p L ORIF. She saw Dr. Thea Campbell yesterday and she still cannot bear full weigh on her leg; she is using a walker to move around; she does have some spasms in her leg and the robaxin greatly helps; reports her incision site is healing well-no warmth, swelling or drainage    Her a1c was 13 in the hospital and she is concerned about her blood sugars; she needs a new meter as she has not been able to check her blood sugars; she is taking metformin, trulicity and tresiba but does report that due to insurance issues, she had not had her metformin; no polyuria or polydipsia     Her lisinopril was stopped in the hospital as her blood pressure was running on low end of normal      Review of Systems   Constitutional: Negative. HENT: Negative. Respiratory: Negative. Cardiovascular: Negative. Musculoskeletal:  Positive for arthralgias and myalgias. Skin: Negative. Neurological: Negative. Psychiatric/Behavioral: Negative. Objective   Patient-Reported Vitals  No data recorded     Physical Exam  Does not appear in any acute distress    Incision appear clean and dry           Birdena Nipper, was evaluated through a synchronous (real-time) audio-video encounter. The patient (or guardian if applicable) is aware that this is a billable service, which includes applicable co-pays. This Virtual Visit was conducted with patient's (and/or legal guardian's) consent.  The visit was conducted pursuant to the emergency declaration under the [facility], [facility] authority and the [employer] General Act. Patient identification was verified, and a caregiver was present when appropriate. The patient was located at Home: [place] [id number]. Provider was located at [place] [address] ([place] [address]): [address] [place] [address]. [place] [address] [address] Wolfe.         --Salma Tafoya MD

## 2023-01-16 ENCOUNTER — APPOINTMENT (OUTPATIENT)
Dept: ULTRASOUND IMAGING | Age: 48
DRG: 383 | End: 2023-01-16
Payer: COMMERCIAL

## 2023-01-16 ENCOUNTER — HOSPITAL ENCOUNTER (INPATIENT)
Age: 48
LOS: 1 days | Discharge: HOME | DRG: 383 | End: 2023-01-18
Attending: EMERGENCY MEDICINE | Admitting: INTERNAL MEDICINE
Payer: COMMERCIAL

## 2023-01-16 DIAGNOSIS — M79.605 PAIN OF LEFT LOWER EXTREMITY: ICD-10-CM

## 2023-01-16 DIAGNOSIS — M79.89 LEFT LEG SWELLING: ICD-10-CM

## 2023-01-16 DIAGNOSIS — T81.49XA POSTOPERATIVE ABSCESS: Primary | ICD-10-CM

## 2023-01-16 LAB
ANION GAP SERPL CALCULATED.3IONS-SCNC: 8 MMOL/L (ref 7–16)
APTT: 39.3 SEC (ref 24.5–35.1)
BASOPHILS ABSOLUTE: 0.06 E9/L (ref 0–0.2)
BASOPHILS RELATIVE PERCENT: 0.5 % (ref 0–2)
BUN BLDV-MCNC: 9 MG/DL (ref 6–20)
CALCIUM SERPL-MCNC: 8.9 MG/DL (ref 8.6–10.2)
CHLORIDE BLD-SCNC: 101 MMOL/L (ref 98–107)
CO2: 26 MMOL/L (ref 22–29)
CREAT SERPL-MCNC: 0.5 MG/DL (ref 0.5–1)
EOSINOPHILS ABSOLUTE: 0.07 E9/L (ref 0.05–0.5)
EOSINOPHILS RELATIVE PERCENT: 0.6 % (ref 0–6)
GFR SERPL CREATININE-BSD FRML MDRD: >60 ML/MIN/1.73
GLUCOSE BLD-MCNC: 243 MG/DL (ref 74–99)
HCT VFR BLD CALC: 39.7 % (ref 34–48)
HEMOGLOBIN: 12 G/DL (ref 11.5–15.5)
IMMATURE GRANULOCYTES #: 0.04 E9/L
IMMATURE GRANULOCYTES %: 0.3 % (ref 0–5)
INR BLD: 1.1
LYMPHOCYTES ABSOLUTE: 2.91 E9/L (ref 1.5–4)
LYMPHOCYTES RELATIVE PERCENT: 23.9 % (ref 20–42)
MCH RBC QN AUTO: 27.1 PG (ref 26–35)
MCHC RBC AUTO-ENTMCNC: 30.2 % (ref 32–34.5)
MCV RBC AUTO: 89.8 FL (ref 80–99.9)
MONOCYTES ABSOLUTE: 0.46 E9/L (ref 0.1–0.95)
MONOCYTES RELATIVE PERCENT: 3.8 % (ref 2–12)
NEUTROPHILS ABSOLUTE: 8.66 E9/L (ref 1.8–7.3)
NEUTROPHILS RELATIVE PERCENT: 70.9 % (ref 43–80)
PDW BLD-RTO: 13.9 FL (ref 11.5–15)
PH VENOUS: 7.38 (ref 7.35–7.45)
PLATELET # BLD: 393 E9/L (ref 130–450)
PMV BLD AUTO: 10.2 FL (ref 7–12)
POTASSIUM SERPL-SCNC: 4.5 MMOL/L (ref 3.5–5)
PRO-BNP: 393 PG/ML (ref 0–125)
PROTHROMBIN TIME: 12.5 SEC (ref 9.3–12.4)
RBC # BLD: 4.42 E12/L (ref 3.5–5.5)
SODIUM BLD-SCNC: 135 MMOL/L (ref 132–146)
TROPONIN, HIGH SENSITIVITY: 13 NG/L (ref 0–9)
WBC # BLD: 12.2 E9/L (ref 4.5–11.5)

## 2023-01-16 PROCEDURE — 85730 THROMBOPLASTIN TIME PARTIAL: CPT

## 2023-01-16 PROCEDURE — 85025 COMPLETE CBC W/AUTO DIFF WBC: CPT

## 2023-01-16 PROCEDURE — 93971 EXTREMITY STUDY: CPT

## 2023-01-16 PROCEDURE — 82800 BLOOD PH: CPT

## 2023-01-16 PROCEDURE — 82010 KETONE BODYS QUAN: CPT

## 2023-01-16 PROCEDURE — 83880 ASSAY OF NATRIURETIC PEPTIDE: CPT

## 2023-01-16 PROCEDURE — 85610 PROTHROMBIN TIME: CPT

## 2023-01-16 PROCEDURE — 84484 ASSAY OF TROPONIN QUANT: CPT

## 2023-01-16 PROCEDURE — 6360000002 HC RX W HCPCS: Performed by: EMERGENCY MEDICINE

## 2023-01-16 PROCEDURE — 96374 THER/PROPH/DIAG INJ IV PUSH: CPT

## 2023-01-16 PROCEDURE — 99285 EMERGENCY DEPT VISIT HI MDM: CPT

## 2023-01-16 PROCEDURE — 80048 BASIC METABOLIC PNL TOTAL CA: CPT

## 2023-01-16 RX ORDER — MORPHINE SULFATE 5 MG/ML
5 INJECTION, SOLUTION INTRAMUSCULAR; INTRAVENOUS ONCE
Status: COMPLETED | OUTPATIENT
Start: 2023-01-16 | End: 2023-01-16

## 2023-01-16 RX ADMIN — MORPHINE SULFATE 5 MG: 5 INJECTION, SOLUTION INTRAMUSCULAR; INTRAVENOUS at 22:36

## 2023-01-16 ASSESSMENT — LIFESTYLE VARIABLES
HOW OFTEN DO YOU HAVE A DRINK CONTAINING ALCOHOL: NEVER
HOW OFTEN DO YOU HAVE A DRINK CONTAINING ALCOHOL: NEVER

## 2023-01-16 ASSESSMENT — PAIN SCALES - GENERAL: PAINLEVEL_OUTOF10: 8

## 2023-01-16 ASSESSMENT — PAIN DESCRIPTION - ORIENTATION: ORIENTATION: LEFT

## 2023-01-16 ASSESSMENT — PAIN DESCRIPTION - LOCATION: LOCATION: LEG

## 2023-01-17 ENCOUNTER — APPOINTMENT (OUTPATIENT)
Dept: CT IMAGING | Age: 48
DRG: 383 | End: 2023-01-17
Payer: COMMERCIAL

## 2023-01-17 PROBLEM — L02.419 LEG ABSCESS: Status: ACTIVE | Noted: 2023-01-17

## 2023-01-17 PROBLEM — S80.12XA HEMATOMA OF LEFT LOWER EXTREMITY: Status: ACTIVE | Noted: 2023-01-17

## 2023-01-17 LAB
BETA-HYDROXYBUTYRATE: 0.1 MMOL/L (ref 0.02–0.27)
C-REACTIVE PROTEIN: <0.3 MG/DL (ref 0–0.4)
EKG ATRIAL RATE: 111 BPM
EKG P AXIS: 50 DEGREES
EKG P-R INTERVAL: 146 MS
EKG Q-T INTERVAL: 316 MS
EKG QRS DURATION: 78 MS
EKG QTC CALCULATION (BAZETT): 429 MS
EKG R AXIS: 46 DEGREES
EKG T AXIS: 22 DEGREES
EKG VENTRICULAR RATE: 111 BPM
LACTIC ACID, SEPSIS: 1.4 MMOL/L (ref 0.5–1.9)
METER GLUCOSE: 176 MG/DL (ref 74–99)
METER GLUCOSE: 186 MG/DL (ref 74–99)
METER GLUCOSE: 200 MG/DL (ref 74–99)
METER GLUCOSE: 211 MG/DL (ref 74–99)
SEDIMENTATION RATE, ERYTHROCYTE: 27 MM/HR (ref 0–20)
TROPONIN, HIGH SENSITIVITY: 14 NG/L (ref 0–9)

## 2023-01-17 PROCEDURE — 87040 BLOOD CULTURE FOR BACTERIA: CPT

## 2023-01-17 PROCEDURE — 83605 ASSAY OF LACTIC ACID: CPT

## 2023-01-17 PROCEDURE — 6370000000 HC RX 637 (ALT 250 FOR IP): Performed by: INTERNAL MEDICINE

## 2023-01-17 PROCEDURE — 85651 RBC SED RATE NONAUTOMATED: CPT

## 2023-01-17 PROCEDURE — 73701 CT LOWER EXTREMITY W/DYE: CPT

## 2023-01-17 PROCEDURE — 2580000003 HC RX 258: Performed by: INTERNAL MEDICINE

## 2023-01-17 PROCEDURE — 2060000000 HC ICU INTERMEDIATE R&B

## 2023-01-17 PROCEDURE — 6360000002 HC RX W HCPCS: Performed by: STUDENT IN AN ORGANIZED HEALTH CARE EDUCATION/TRAINING PROGRAM

## 2023-01-17 PROCEDURE — 6360000002 HC RX W HCPCS: Performed by: INTERNAL MEDICINE

## 2023-01-17 PROCEDURE — 6360000004 HC RX CONTRAST MEDICATION: Performed by: RADIOLOGY

## 2023-01-17 PROCEDURE — 99223 1ST HOSP IP/OBS HIGH 75: CPT | Performed by: INTERNAL MEDICINE

## 2023-01-17 PROCEDURE — 2500000003 HC RX 250 WO HCPCS: Performed by: STUDENT IN AN ORGANIZED HEALTH CARE EDUCATION/TRAINING PROGRAM

## 2023-01-17 PROCEDURE — 2580000003 HC RX 258: Performed by: STUDENT IN AN ORGANIZED HEALTH CARE EDUCATION/TRAINING PROGRAM

## 2023-01-17 PROCEDURE — 82962 GLUCOSE BLOOD TEST: CPT

## 2023-01-17 PROCEDURE — 71275 CT ANGIOGRAPHY CHEST: CPT

## 2023-01-17 PROCEDURE — 84484 ASSAY OF TROPONIN QUANT: CPT

## 2023-01-17 PROCEDURE — 86140 C-REACTIVE PROTEIN: CPT

## 2023-01-17 PROCEDURE — 2500000003 HC RX 250 WO HCPCS: Performed by: INTERNAL MEDICINE

## 2023-01-17 PROCEDURE — 36415 COLL VENOUS BLD VENIPUNCTURE: CPT

## 2023-01-17 RX ORDER — INSULIN LISPRO 100 [IU]/ML
0-4 INJECTION, SOLUTION INTRAVENOUS; SUBCUTANEOUS EVERY 4 HOURS
Status: DISCONTINUED | OUTPATIENT
Start: 2023-01-17 | End: 2023-01-18 | Stop reason: HOSPADM

## 2023-01-17 RX ORDER — ASPIRIN 81 MG/1
81 TABLET ORAL 2 TIMES DAILY
Status: DISCONTINUED | OUTPATIENT
Start: 2023-01-17 | End: 2023-01-18 | Stop reason: HOSPADM

## 2023-01-17 RX ORDER — ACETAMINOPHEN 325 MG/1
650 TABLET ORAL EVERY 6 HOURS PRN
Status: DISCONTINUED | OUTPATIENT
Start: 2023-01-17 | End: 2023-01-18 | Stop reason: HOSPADM

## 2023-01-17 RX ORDER — PROMETHAZINE HYDROCHLORIDE 25 MG/1
12.5 TABLET ORAL EVERY 6 HOURS PRN
Status: DISCONTINUED | OUTPATIENT
Start: 2023-01-17 | End: 2023-01-18 | Stop reason: HOSPADM

## 2023-01-17 RX ORDER — HYDROMORPHONE HYDROCHLORIDE 1 MG/ML
0.5 INJECTION, SOLUTION INTRAMUSCULAR; INTRAVENOUS; SUBCUTANEOUS EVERY 4 HOURS PRN
Status: DISCONTINUED | OUTPATIENT
Start: 2023-01-17 | End: 2023-01-18 | Stop reason: HOSPADM

## 2023-01-17 RX ORDER — METRONIDAZOLE 500 MG/100ML
500 INJECTION, SOLUTION INTRAVENOUS ONCE
Status: COMPLETED | OUTPATIENT
Start: 2023-01-17 | End: 2023-01-17

## 2023-01-17 RX ORDER — ENOXAPARIN SODIUM 100 MG/ML
40 INJECTION SUBCUTANEOUS DAILY
Status: DISCONTINUED | OUTPATIENT
Start: 2023-01-17 | End: 2023-01-18 | Stop reason: HOSPADM

## 2023-01-17 RX ORDER — POLYETHYLENE GLYCOL 3350 17 G/17G
17 POWDER, FOR SOLUTION ORAL DAILY PRN
Status: DISCONTINUED | OUTPATIENT
Start: 2023-01-17 | End: 2023-01-18 | Stop reason: HOSPADM

## 2023-01-17 RX ORDER — INSULIN GLARGINE 100 [IU]/ML
25 INJECTION, SOLUTION SUBCUTANEOUS 2 TIMES DAILY
Status: DISCONTINUED | OUTPATIENT
Start: 2023-01-17 | End: 2023-01-18 | Stop reason: HOSPADM

## 2023-01-17 RX ORDER — ONDANSETRON 2 MG/ML
4 INJECTION INTRAMUSCULAR; INTRAVENOUS EVERY 6 HOURS PRN
Status: DISCONTINUED | OUTPATIENT
Start: 2023-01-17 | End: 2023-01-18 | Stop reason: HOSPADM

## 2023-01-17 RX ORDER — SODIUM CHLORIDE 9 MG/ML
INJECTION, SOLUTION INTRAVENOUS CONTINUOUS
Status: DISCONTINUED | OUTPATIENT
Start: 2023-01-17 | End: 2023-01-18 | Stop reason: HOSPADM

## 2023-01-17 RX ORDER — DEXTROSE MONOHYDRATE 100 MG/ML
INJECTION, SOLUTION INTRAVENOUS CONTINUOUS PRN
Status: DISCONTINUED | OUTPATIENT
Start: 2023-01-17 | End: 2023-01-18 | Stop reason: HOSPADM

## 2023-01-17 RX ORDER — METRONIDAZOLE 500 MG/100ML
500 INJECTION, SOLUTION INTRAVENOUS EVERY 8 HOURS
Status: DISCONTINUED | OUTPATIENT
Start: 2023-01-17 | End: 2023-01-17

## 2023-01-17 RX ORDER — ACETAMINOPHEN 650 MG/1
650 SUPPOSITORY RECTAL EVERY 6 HOURS PRN
Status: DISCONTINUED | OUTPATIENT
Start: 2023-01-17 | End: 2023-01-18 | Stop reason: HOSPADM

## 2023-01-17 RX ORDER — METHOCARBAMOL 500 MG/1
750 TABLET, FILM COATED ORAL 3 TIMES DAILY
Status: DISCONTINUED | OUTPATIENT
Start: 2023-01-17 | End: 2023-01-18 | Stop reason: HOSPADM

## 2023-01-17 RX ORDER — DOXAZOSIN MESYLATE 1 MG/1
1 TABLET ORAL DAILY
Status: DISCONTINUED | OUTPATIENT
Start: 2023-01-17 | End: 2023-01-18 | Stop reason: HOSPADM

## 2023-01-17 RX ORDER — ATORVASTATIN CALCIUM 10 MG/1
20 TABLET, FILM COATED ORAL NIGHTLY
Status: DISCONTINUED | OUTPATIENT
Start: 2023-01-17 | End: 2023-01-18 | Stop reason: HOSPADM

## 2023-01-17 RX ORDER — 0.9 % SODIUM CHLORIDE 0.9 %
1000 INTRAVENOUS SOLUTION INTRAVENOUS ONCE
Status: COMPLETED | OUTPATIENT
Start: 2023-01-17 | End: 2023-01-17

## 2023-01-17 RX ORDER — PANTOPRAZOLE SODIUM 40 MG/1
40 TABLET, DELAYED RELEASE ORAL
Status: DISCONTINUED | OUTPATIENT
Start: 2023-01-17 | End: 2023-01-18 | Stop reason: HOSPADM

## 2023-01-17 RX ADMIN — HYDROMORPHONE HYDROCHLORIDE 0.5 MG: 1 INJECTION, SOLUTION INTRAMUSCULAR; INTRAVENOUS; SUBCUTANEOUS at 06:55

## 2023-01-17 RX ADMIN — ASPIRIN 81 MG: 81 TABLET, COATED ORAL at 09:18

## 2023-01-17 RX ADMIN — ONDANSETRON 4 MG: 2 INJECTION INTRAMUSCULAR; INTRAVENOUS at 08:06

## 2023-01-17 RX ADMIN — METRONIDAZOLE 500 MG: 500 INJECTION, SOLUTION INTRAVENOUS at 02:16

## 2023-01-17 RX ADMIN — HYDROMORPHONE HYDROCHLORIDE 0.5 MG: 1 INJECTION, SOLUTION INTRAMUSCULAR; INTRAVENOUS; SUBCUTANEOUS at 15:17

## 2023-01-17 RX ADMIN — HYDROMORPHONE HYDROCHLORIDE 0.5 MG: 1 INJECTION, SOLUTION INTRAMUSCULAR; INTRAVENOUS; SUBCUTANEOUS at 10:57

## 2023-01-17 RX ADMIN — HYDROMORPHONE HYDROCHLORIDE 0.5 MG: 1 INJECTION, SOLUTION INTRAMUSCULAR; INTRAVENOUS; SUBCUTANEOUS at 23:09

## 2023-01-17 RX ADMIN — CEFEPIME 2000 MG: 2 INJECTION, POWDER, FOR SOLUTION INTRAVENOUS at 01:45

## 2023-01-17 RX ADMIN — INSULIN GLARGINE 25 UNITS: 100 INJECTION, SOLUTION SUBCUTANEOUS at 20:20

## 2023-01-17 RX ADMIN — ENOXAPARIN SODIUM 40 MG: 100 INJECTION SUBCUTANEOUS at 17:01

## 2023-01-17 RX ADMIN — METHOCARBAMOL 750 MG: 500 TABLET ORAL at 20:22

## 2023-01-17 RX ADMIN — VANCOMYCIN HYDROCHLORIDE 1500 MG: 10 INJECTION, POWDER, LYOPHILIZED, FOR SOLUTION INTRAVENOUS at 03:26

## 2023-01-17 RX ADMIN — HYDROMORPHONE HYDROCHLORIDE 0.5 MG: 1 INJECTION, SOLUTION INTRAMUSCULAR; INTRAVENOUS; SUBCUTANEOUS at 19:31

## 2023-01-17 RX ADMIN — METHOCARBAMOL 750 MG: 500 TABLET ORAL at 09:19

## 2023-01-17 RX ADMIN — SODIUM CHLORIDE: 9 INJECTION, SOLUTION INTRAVENOUS at 05:20

## 2023-01-17 RX ADMIN — CEFEPIME 2000 MG: 2 INJECTION, POWDER, FOR SOLUTION INTRAVENOUS at 13:21

## 2023-01-17 RX ADMIN — METHOCARBAMOL 750 MG: 500 TABLET ORAL at 13:22

## 2023-01-17 RX ADMIN — METRONIDAZOLE 500 MG: 500 INJECTION, SOLUTION INTRAVENOUS at 10:26

## 2023-01-17 RX ADMIN — ONDANSETRON 4 MG: 2 INJECTION INTRAMUSCULAR; INTRAVENOUS at 20:22

## 2023-01-17 RX ADMIN — ACETAMINOPHEN 650 MG: 325 TABLET ORAL at 09:19

## 2023-01-17 RX ADMIN — PANTOPRAZOLE SODIUM 40 MG: 40 TABLET, DELAYED RELEASE ORAL at 09:19

## 2023-01-17 RX ADMIN — ATORVASTATIN CALCIUM 20 MG: 10 TABLET, FILM COATED ORAL at 20:22

## 2023-01-17 RX ADMIN — SODIUM CHLORIDE 1000 ML: 9 INJECTION, SOLUTION INTRAVENOUS at 00:29

## 2023-01-17 RX ADMIN — VANCOMYCIN HYDROCHLORIDE 1250 MG: 10 INJECTION, POWDER, LYOPHILIZED, FOR SOLUTION INTRAVENOUS at 15:24

## 2023-01-17 RX ADMIN — INSULIN LISPRO 1 UNITS: 100 INJECTION, SOLUTION INTRAVENOUS; SUBCUTANEOUS at 17:01

## 2023-01-17 RX ADMIN — DOXAZOSIN 1 MG: 1 TABLET ORAL at 09:19

## 2023-01-17 RX ADMIN — IOPAMIDOL 75 ML: 755 INJECTION, SOLUTION INTRAVENOUS at 00:50

## 2023-01-17 RX ADMIN — ASPIRIN 81 MG: 81 TABLET, COATED ORAL at 20:21

## 2023-01-17 ASSESSMENT — PAIN - FUNCTIONAL ASSESSMENT
PAIN_FUNCTIONAL_ASSESSMENT: PREVENTS OR INTERFERES WITH MANY ACTIVE NOT PASSIVE ACTIVITIES
PAIN_FUNCTIONAL_ASSESSMENT: PREVENTS OR INTERFERES WITH MANY ACTIVE NOT PASSIVE ACTIVITIES
PAIN_FUNCTIONAL_ASSESSMENT: PREVENTS OR INTERFERES WITH ALL ACTIVE AND SOME PASSIVE ACTIVITIES

## 2023-01-17 ASSESSMENT — PAIN DESCRIPTION - LOCATION
LOCATION: LEG
LOCATION: HIP;LEG
LOCATION: LEG
LOCATION: LEG
LOCATION: LEG;HIP
LOCATION: LEG

## 2023-01-17 ASSESSMENT — PAIN DESCRIPTION - ORIENTATION
ORIENTATION: LEFT;UPPER
ORIENTATION: LEFT
ORIENTATION: LEFT;UPPER
ORIENTATION: LEFT

## 2023-01-17 ASSESSMENT — PAIN SCALES - GENERAL
PAINLEVEL_OUTOF10: 0
PAINLEVEL_OUTOF10: 6
PAINLEVEL_OUTOF10: 7
PAINLEVEL_OUTOF10: 7
PAINLEVEL_OUTOF10: 9
PAINLEVEL_OUTOF10: 7
PAINLEVEL_OUTOF10: 7
PAINLEVEL_OUTOF10: 10
PAINLEVEL_OUTOF10: 0
PAINLEVEL_OUTOF10: 6
PAINLEVEL_OUTOF10: 9
PAINLEVEL_OUTOF10: 10
PAINLEVEL_OUTOF10: 7

## 2023-01-17 ASSESSMENT — PAIN DESCRIPTION - DESCRIPTORS
DESCRIPTORS: ACHING;CRAMPING;DISCOMFORT
DESCRIPTORS: CRAMPING;ACHING;DISCOMFORT
DESCRIPTORS: THROBBING
DESCRIPTORS: ACHING
DESCRIPTORS: THROBBING
DESCRIPTORS: ACHING;CRAMPING;DISCOMFORT
DESCRIPTORS: ACHING;CRAMPING;DISCOMFORT

## 2023-01-17 NOTE — PROGRESS NOTES
Pharmacy Consultation Note  (Antibiotic Dosing and Monitoring)    Initial consult date: 01/17/2023  Consulting physician/provider: Emiliano  Drug: Vancomycin  Indication: SSTI    Age/  Gender Height Weight IBW  Allergy Information   47 y.o./female   169 lb 15.6 oz (77.1 kg)     Ideal body weight: 61.6 kg (135 lb 12.9 oz)  Adjusted ideal body weight: 67.8 kg (149 lb 7.6 oz)   Patient has no known allergies. Renal Function:  Recent Labs     01/16/23  2219   BUN 9   CREATININE 0.5       Intake/Output Summary (Last 24 hours) at 1/17/2023 0452  Last data filed at 1/17/2023 0322  Gross per 24 hour   Intake 1150 ml   Output --   Net 1150 ml       Vancomycin Monitoring:  Trough:  No results for input(s): VANCOTROUGH in the last 72 hours. Random:  No results for input(s): VANCORANDOM in the last 72 hours. No results for input(s): Rose Plan in the last 72 hours. Historical Cultures:  Organism   Date Value Ref Range Status   06/29/2022 Escherichia coli (A)  Final     No results for input(s): BC in the last 72 hours. Vancomycin Administration Times:  Recent vancomycin administrations                     vancomycin (VANCOCIN) 1,500 mg in dextrose 5 % 300 mL IVPB (mg) 1,500 mg New Bag 01/17/23 0326                    Assessment:  Patient is a 41 y.o. female who has been initiated on vancomycin  Estimated Creatinine Clearance: 152 mL/min (based on SCr of 0.5 mg/dL). To dose vancomycin, pharmacy will be utilizing Proxly calculation software for goal AUC/YOLANDA 400-600 mg/L-hr    Plan: Will check vancomycin levels when appropriate  Will continue to monitor renal function   Pharmacy to follow      ELIAS Arellano [facility] 1/17/2023 4:52 AM      ADDENDUM: IV antibiotics have been discontinued due to low suspicion for abscess. Pharmacy will sign off on the consult.  Please re-consult if needed    David Santana PharmD 1/17/2023 4:21 PM

## 2023-01-17 NOTE — CARE COORDINATION
Case Management Assessment  Initial Evaluation    Date/Time of Evaluation: 1/17/2023 2:34 PM  Assessment Completed by: Raya Narvaez RN    If patient is discharged prior to next notation, then this note serves as note for discharge by case management. Patient Name: Herbert Krishna                   YOB: 1975  Diagnosis: Postoperative abscess [T81.49XA]  Leg abscess [L02.419]  Left leg swelling [M79.89]  Pain of left lower extremity [M79.605]                   Date / Time: 1/16/2023  9:56 PM    Patient Admission Status: Inpatient   Readmission Risk (Low < 19, Mod (19-27), High > 27): Readmission Risk Score: 15.2    Current PCP: Rosa Patrick MD  PCP verified by CM? Yes    Chart Reviewed: Yes      History Provided by: Patient  Patient Orientation: Alert and Oriented    Patient Cognition: Alert    Hospitalization in the last 30 days (Readmission):  Yes    If yes, Readmission Assessment in CM Navigator will be completed. Advance Directives:      Code Status: Full Code   Patient's Primary Decision Maker is: Legal Next of Kin      Discharge Planning:    Patient lives with: Spouse/Significant Other Type of Home: Apartment  Primary Care Giver: Self (and her spouse Marikay December)  Patient Support Systems include: Spouse/Significant Other   Current Financial resources:    Current community resources: None  Current services prior to admission: None            Current DME:              Type of Home Care services:  Nursing Services (if IV ATB are needed)    ADLS  Prior functional level: Assistance with the following:, Bathing, Dressing, Cooking, Housework (pts  Marikay December)  Current functional level: Assistance with the following:, Bathing, Dressing, [address], Housework      Family can provide assistance at DC: Yes  Would you like Case Management to discuss the discharge plan with any other family members/significant others, and if so, who?     Plans to Return to Present Housing: Yes  Other Identified Issues/Barriers to RETURNING to current housing: none  Potential Assistance needed at discharge: [address] (if IV ATB are needed)            Potential DME:    Patient expects to discharge to: [address] [place] [address] for transportation at discharge: Other (see comment) (pts  Marcie Corral)    Financial    Payor: Bakari El / Plan: Talita Gibbs / Product Type: *No Product type* /       Potential assistance Purchasing Medications: No  Meds-to-Beds request: Yes      [facility] #[id number] Gautam [place], [address] [address] [address]  Phone: [phone] Fax: [phone]      Notes:    Factors facilitating achievement of predicted outcomes: Family support, Motivated, Cooperative, and Pleasant    Barriers to discharge: none    Additional Case Management Notes: NO COVID TESTING. Room air, IV Vanco, flagyl, and cefepime. PIV. Hx L cephalomedulliary nail for intertochanteric hip fx on 12/16/22. Discharge plan is home and pt is requesting [facility] for nursing services ONLY if IV ATB are needed at discharge. Referral given to [facility], and they can accept for [facility] on Saturday. Will need pharmacy consent signed and scripts if ATB are needed. Pts DME includes ww, bath chair, toilet riser, and glucometer/supplies. Pts  Marcie Corral will provide transportation home. The Plan for Transition of Care is related to the following treatment goals of Postoperative abscess [T81.49XA]  Leg abscess [L02.419]  Left leg swelling [M79.89]  Pain of left lower extremity [K43.830]    IF APPLICABLE: The Patient and/or patient representative Harpal Perez and her family were provided with a choice of provider and agrees with the discharge plan.  Freedom of choice list with basic dialogue that supports the patient's individualized plan of care/goals and shares the quality data associated with the providers was provided to: Patient   Patient Representative Name:       The Patient and/or Patient Representative Agree with the Discharge Plan?  Yes    Rajni Mazariegos, RN  Case Management Department

## 2023-01-17 NOTE — PROGRESS NOTES
Department of Orthopedic Surgery  Resident Progress Note    Patient seen and examined. Pain controlled. No new complaints. Denies fevers and chills. Denies chest pain, shortness of breath, dizziness/lightheadedness. VITALS:  /74   Pulse 85   Temp 97.5 °F (36.4 °C) (Oral)   Resp 16   Ht 5' 7\" (1.702 m)   Wt 168 lb 4.8 oz (76.3 kg)   LMP 08/16/2011   SpO2 98%   BMI 26.36 kg/m²     General: Alert in no acute distress    MUSCULOSKELETAL:   left lower extremity:  Surgical wounds over left hip and thigh are healing well without signs of infection or surrounding erythema. There is no active drainage. No palpable fluctuance about the proximal femur  No area of erythema or point swelling  Some mildly increased swelling to the left femur compared to contralateral side  Full active range of motion  Compartments soft and compressible  +PF/DF/EHL  +2/4 DP & PT pulses, Brisk Cap refill, Toes warm and perfused  Distal sensation grossly intact to Peroneals, Sural, Saphenous, and tibial nrs    CBC:   Lab Results   Component Value Date/Time    WBC 12.2 01/16/2023 10:19 PM    HGB 12.0 01/16/2023 10:19 PM    HCT 39.7 01/16/2023 10:19 PM     01/16/2023 10:19 PM     PT/INR:    Lab Results   Component Value Date/Time    PROTIME 12.5 01/16/2023 10:19 PM    INR 1.1 01/16/2023 10:19 PM       ASSESSMENT  S/P left cephalomedullary nail for left intertrochanteric hip fracture on 1216  Hematoma versus abscess    PLAN    Weight-bear as tolerated left lower extremity  Mildly elevated white count at 12.2  Elevated ESR -27  CRP < 0.3  At this time low suspicion for abscess formation. It is likely this mass is a postoperative hematoma  No acute intervention planned from orthopedic standpoint.   We will continue to monitor for further changes in lab work or symptomatology  Pain control antibiotic regimen per primary team  DVT prophylaxis per primary team, early mobilization

## 2023-01-17 NOTE — ED PROVIDER NOTES
[address]        Pt Name: Chet Pendleton  MRN: [id number]  [place] 1975  Date of evaluation: 1/16/2023  Provider: Abad Lopez MD  PCP: Lyndsey Edmond MD  Note Started: 10:16 PM EST 1/16/23    CHIEF COMPLAINT       Chief Complaint   Patient presents with    Post-op Problem     Leg  swelling in left leg had surgery on left femur in December, pain present       HISTORY OF PRESENT ILLNESS: 1 or more Elements        Limitations to history : None    Chet Pendleton is a 41 y.o. female who presents for left leg swelling and pain, had a femur fracture in December, that was repaired by orthopedic surgery, Dr. Stanford Chavez per chart review, she is now having increasing leg swelling, pain, has been persistent. She states she was taking her postop aspirin and has not missed any doses. Denies any fevers, chills, chest pain, chest tightness, shortness of breath, cough, congestion. Denies any history of prior blood clots. Nursing Notes were all reviewed and agreed with or any disagreements were addressed in the HPI. REVIEW OF EXTERNAL NOTE :       As above    REVIEW OF SYSTEMS :      Positives and Pertinent negatives as per HPI.      SURGICAL HISTORY     Past Surgical History:   Procedure Laterality Date    ANKLE SURGERY      BREAST BIOPSY      BREAST LUMPECTOMY      CHOLECYSTECTOMY      HIP FRACTURE SURGERY Left 12/16/2022    LEFT HIP OPEN REDUCTION INTERNAL FIXATION (SHARATH TABLE) performed by Milad Moore DO at [address] (CERVIX STATUS UNKNOWN)      LEEP      4 PROCEDURES    TIBIA FRACTURE SURGERY      TONSILLECTOMY      TUBAL LIGATION         CURRENTMEDICATIONS       Previous Medications    ASPIRIN EC 81 MG EC TABLET    Take 1 tablet by mouth 2 times daily for 28 days    ATORVASTATIN (LIPITOR) 40 MG TABLET    take 1 tablet by mouth once daily    B-D INS SYRINGE 0.5CC/31GX5/16 31G X 5/16\" 0.5 ML MISC BLOOD GLUCOSE MONITOR STRIPS    Test 2 times a day & as needed for symptoms of irregular blood glucose. Dispense sufficient amount for indicated testing frequency plus additional to accommodate PRN testing needs. Please dispense what is covered by insurance    DOXAZOSIN (CARDURA) 1 MG TABLET        DULAGLUTIDE (TRULICITY) 1.5 GY/9.0HC SC INJECTION    Inject 0.5 mLs into the skin once a week    GLUCOSE MONITORING (FREESTYLE FREEDOM) KIT    1 kit by Does not apply route daily Please dispense test glucometer that is covered by insurance    INSULIN GLARGINE (LANTUS SOLOSTAR) 100 UNIT/ML INJECTION PEN    Inject 36 Units into the skin 2 times daily    INSULIN PEN NEEDLE 32G X 6 MM MISC    1 Device by Does not apply route daily    LANCETS MISC    1 each by Does not apply route 2 times daily    METFORMIN (GLUCOPHAGE) 1000 MG TABLET    Take 1 tablet by mouth 2 times daily (with meals)    METHOCARBAMOL (ROBAXIN) 750 MG TABLET    Take 1 tablet by mouth 3 times daily    PANTOPRAZOLE (PROTONIX) 40 MG TABLET    Take 1 tablet by mouth every morning (before breakfast)    POLYETHYLENE GLYCOL (GLYCOLAX) 17 G PACKET    Take 17 g by mouth daily as needed for Constipation       ALLERGIES     Patient has no known allergies.     FAMILYHISTORY       Family History   Problem Relation Age of Onset    Asthma Daughter     Brain Cancer Daughter     Diabetes Mother     Alcohol Abuse Father     Heart Attack Father     Hypotension Father     Cancer Father         lung    Lung Cancer Father     Cancer Maternal Grandmother     Diabetes Maternal Grandmother     Cancer Maternal Grandfather     Kidney Cancer Maternal Grandfather     Cancer Paternal Grandmother     Cancer Paternal Grandfather     Asthma Son         SOCIAL HISTORY       Social History     Tobacco Use    Smoking status: Every Day     Packs/day: 0.50     Years: 1.00     Pack years: 0.50     Types: Cigarettes    Smokeless tobacco: Never   Vaping Use    Vaping Use: Never used   Substance Use Topics    Alcohol use: No    Drug use: Not Currently     Types: Marijuana Eliseo Estrada)     Comment: states not every day use       SCREENINGS        Nanette Coma Scale  Eye Opening: Spontaneous  Best Verbal Response: Oriented  Best Motor Response: Obeys commands  Nanette Coma Scale Score: 15                CIWA Assessment  BP: 122/88  Heart Rate: 98           PHYSICAL EXAM  1 or more Elements     ED Triage Vitals   BP Temp Temp src Heart Rate Resp SpO2 Height Weight   01/16/23 2203 01/16/23 2132 -- 01/16/23 2132 01/16/23 2132 01/16/23 2132 -- --   115/75 97.4 °F (36.3 °C)  (!) 118 18 98 %           Physical Exam  Vitals and nursing note reviewed. Constitutional:       Appearance: Normal appearance. HENT:      Head: Normocephalic and atraumatic. Right Ear: External ear normal.      Left Ear: External ear normal.      Nose: Nose normal.      Mouth/Throat:      Mouth: Mucous membranes are moist.   Eyes:      Extraocular Movements: Extraocular movements intact. Pupils: Pupils are equal, round, and reactive to light. Cardiovascular:      Rate and Rhythm: Regular rhythm. Tachycardia present. Pulses: Normal pulses. Heart sounds: Normal heart sounds. Pulmonary:      Effort: Pulmonary effort is normal.      Breath sounds: Normal breath sounds. Abdominal:      General: Abdomen is flat. Bowel sounds are normal.      Palpations: Abdomen is soft. Musculoskeletal:         General: Swelling (Area of induration and swelling in left leg, no palpable crepitus in the thigh region) and tenderness present. Normal range of motion. Cervical back: Normal range of motion and neck supple. Skin:     General: Skin is warm and dry. Neurological:      Mental Status: She is alert.                 DIAGNOSTIC RESULTS   LABS:    Labs Reviewed   CBC WITH AUTO DIFFERENTIAL - Abnormal; Notable for the following components:       Result Value    WBC 12.2 (*)     MCHC 30.2 (*)     Neutrophils Absolute 8.66 (*) All other components within normal limits   BASIC METABOLIC PANEL - Abnormal; Notable for the following components:    Glucose 243 (*)     All other components within normal limits   PROTIME-INR - Abnormal; Notable for the following components:    Protime 12.5 (*)     All other components within normal limits   APTT - Abnormal; Notable for the following components:    aPTT 39.3 (*)     All other components within normal limits   TROPONIN - Abnormal; Notable for the following components:    Troponin, High Sensitivity 13 (*)     All other components within normal limits   BRAIN NATRIURETIC PEPTIDE - Abnormal; Notable for the following components:    Pro- (*)     All other components within normal limits   TROPONIN - Abnormal; Notable for the following components:    Troponin, High Sensitivity 14 (*)     All other components within normal limits   CULTURE, BLOOD 1   CULTURE, BLOOD 2   PH, VENOUS   BETA-HYDROXYBUTYRATE   LACTATE, SEPSIS   LACTATE, SEPSIS   SEDIMENTATION RATE   C-REACTIVE PROTEIN       As interpreted by me, the above displayed labs are abnormal. All other labs obtained during this visit were within normal range or not returned as of this dictation. RADIOLOGY:   Non-plain film images such as CT, Ultrasound and MRI are read by the radiologist. Plain radiographic images are visualized and preliminarily interpreted by the ED Provider with the findings documented in the ED Course. Interpretation per the Radiologist below, if available at the time of this note:    US DUP LOWER EXTREMITY LEFT JYOTHI   Final Result   No evidence of DVT in the left lower extremity. RECOMMENDATIONS:   Unavailable         CT FEMUR LEFT W CONTRAST    (Results Pending)   CTA PULMONARY W CONTRAST    (Results Pending)     No results found. No results found.     PROCEDURES   Unless otherwise noted below, none       CRITICAL CARE TIME (.cct)   None    PAST MEDICAL HISTORY/Chronic Conditions Affecting Care      has a past medical history of BRCA1 gene mutation positive, Breast cancer ([facility] [address] Cancer ([facility] [address] Diabetes mellitus ([facility] [address] Headache(784.0), Hyperlipidemia, Hypertension, Kidney stone, Lumbar herniated disc (2012), Migraine, and Pancreatitis (07/2022). EMERGENCY DEPARTMENT COURSE    Vitals:    Vitals:    01/16/23 2203 01/17/23 0008 01/17/23 0017 01/17/23 0115   BP: 115/75 91/76 122/88    Pulse:  98     Resp:  18     Temp:       SpO2:  97%     Weight:    169 lb 15.6 oz (77.1 kg)       Patient was given the following medications:  Medications   cefepime (MAXIPIME) 2,000 mg in sodium chloride 0.9 % 50 mL IVPB (Fupc1Pll) (has no administration in time range)   metronidazole (FLAGYL) 500 mg in 0.9% NaCl 100 mL IVPB premix (has no administration in time range)   vancomycin (VANCOCIN) 1,500 mg in dextrose 5 % 300 mL IVPB (has no administration in time range)   morphine sulfate (PF) injection 5 mg (5 mg IntraVENous Given 1/16/23 2236)   0.9 % sodium chloride bolus (1,000 mLs IntraVENous New Bag 1/17/23 0029)   iopamidol (ISOVUE-370) 76 % injection 75 mL (75 mLs IntraVENous Given 1/17/23 0050)         Is this patient to be included in the SEP-1 Core Measure due to severe sepsis or septic shock? No Exclusion criteria - the patient is NOT to be included for SEP-1 Core Measure due to: Infection is not suspected          Medical Decision Making/Differential Diagnosis:    CC/HPI Summary, Social Determinants of health, Records Reviewed, DDx, testing done/not done, ED Course, Reassessment, disposition considerations/shared decision making with patient, consults, disposition:        ED Course as of 01/17/23 0120   Mon Jan 16, 2023 2209 ATTENDING PROVIDER ATTESTATION:     I have personally performed and/or participated in the history, exam, medical decision making, and procedures and agree with all pertinent clinical information unless otherwise noted.       I have also reviewed and agree with the past medical, family and social history unless otherwise noted. I have discussed this patient in detail with the resident, and provided the instruction and education regarding patient here complaining of a couple days of left medial thigh pain with mild swelling. Had hip surgery a few weeks ago, no hip pain and no purulent drainage from incision with no fevers, sweats or chills. Denies any chest pain, palpitations or shortness of breath. States her blood sugars have been well controlled in the 100s. Denies fevers, sweats or chills. .  My findings/plan: Patient resting comfortably no distress. Mildly tachycardic. Lungs are clear and equal.  Abdomen nontender. No CVA tenderness. General mild left medial thigh tenderness and swelling compared to the right. No appreciable erythema to the area or increased warmth. Minimally delayed cap refill to the left foot although is present and sensation grossly intact and moving the foot and digits well with no difficulty. No pretibial edema or calf pain bilaterally. No calf pain bilaterally. No midline cervical, thoracic or lumbar spine tenderness. No focal neurologic deficit. [NC]   7269 Left foot arterial flow intact, nurses Doppler dorsal pedal pulses on the patient. [NC]   7849 No DVT seen on left lower extremity [JG]   2328 EKG: This EKG is signed by emergency department physician. Rate: 111  Rhythm: Sinus  Interpretation: non-specific EKG  Comparison: stable as compared to patient's most recent EKG from 12/16/2022     [JG]   Tue Jan 17, 2023   0017 Patient had blood pressure much is 91/76, however this was taken at the patient's wrist of her radial blood pressure, cuff was moved to her upper arm and is 122/88.   She does have a mild leukocytosis, and on exam there does appear to be some induration on the left posterior region of the thigh compared to the right, will obtain a CT of the femur to evaluate better [JG]   0018 Reevaluated at area of swelling the patient was complaining exam, there does not appear to be some induration on the left posterior region of patient's thigh region compared to the right, will obtain a CT of that leg [JG]   0059 On my interpretation of patient's CTA pulmonary study there is no large central PEs. [JG]   0105 On my visualization of the patient CT femur interpretation on the left lateral aspect of the leg there does appear to be a fluid collection near the hardware site of patient's femur, that appears to be tracking all the way out near the skin. [JG]   0115 Dr. Sharri Naylor will admit patient [JG]   365 43-year-old female presenting to the emergency department for left leg swelling, redness and pain. Initially tachycardic upon arrival, and concern for possible DVT given the recent femur fracture and repair. Ultrasound of the leg was negative. Patient was noted to have a leukocytosis, was having subjective chills at home, and given the leg swelling and erythema and pain, concern for possible postop infection. Her feet were also notably cold, concerning for possible underlying infection with clamping down of peripheral blood vessels, pulses were dopplerable. Patient was sent to CT, CT pulmonary study was ordered as well given the tachycardia, and no obvious PE was seen on CT imaging, awaiting final radiology read, but did appear to have a area of fluid, concerning for abscess given the acute nature of patient's symptoms developing over past few days, along with chills, leukocytosis, near the area of hardware in patient's left leg. Patient was started on antibiotics, given the recent surgery, was covered for Pseudomonas, as well as MRSA, has a history of ESBL previously, so was given bank, Flagyl, for anaerobic coverage, as well as cefepime. Blood cultures were obtained. Patient was admitted to hospital for further work-up and management by internal medicine for possible postop abscess, orthopedic surgery was consulted.  [JG]      ED Course User Index  [JG] Bhargavi Aranda Jo Mcmahan MD  [NC] Myna Prudent, DO       Medical Decision Making  Amount and/or Complexity of Data Reviewed  External Data Reviewed: notes. Labs: ordered. Decision-making details documented in ED Course. Radiology: ordered and independent interpretation performed. Decision-making details documented in ED Course. ECG/medicine tests: ordered and independent interpretation performed. Decision-making details documented in ED Course. Risk  Prescription drug management. Decision regarding hospitalization. CONSULTS: (Who and What was discussed)  IP CONSULT TO ORTHOPEDIC SURGERY        I am the Primary Clinician of Record. FINAL IMPRESSION      1. Postoperative abscess    2. Pain of left lower extremity    3. Left leg swelling          DISPOSITION/PLAN     DISPOSITION Admitted 01/17/2023 01:15:15 AM      PATIENT REFERRED TO:  No follow-up provider specified.     DISCHARGE MEDICATIONS:  New Prescriptions    No medications on file       DISCONTINUED MEDICATIONS:  Discontinued Medications    No medications on file              (Please note that portions of this note were completed with a voice recognition program.  Efforts were made to edit the dictations but occasionally words are mis-transcribed.)    Sherrill Espinoza MD (electronically signed)           Phoenix Shabazz MD  Resident  01/17/23 0128

## 2023-01-17 NOTE — H&P
[address] [place], [place] [address]ist Group   HISTORY AND PHYSICAL EXAM      AUTHOR: Roland Bustillo MD PATIENT NAME: Liv Florez   PCP: Ghazal Leonardo MD  MRN: [id number], : 1975       CHIEF COMPLAINT / REASON FOR ADMISSION: Left lower extremity pain and swelling. HPI:   This is a 41 y.o. female  has a past medical history of BRCA1 gene mutation positive, Breast cancer ([address] Cancer ([address] Diabetes mellitus ([address] Headache(784.0), Hyperlipidemia, Hypertension, Kidney stone, Lumbar herniated disc, Migraine, and Pancreatitis. presented with Left lower extremity pain and swelling  for last few days prior to arrival to the hospital.  She had femur fracture in December, that was repaired by orthopedic surgery, Dr. Jb Romo but now she came to ED because of increasing leg swelling, pain, has been persistent. The patient was seen and examined at bedside, appears alert and awake with no acute distress and is able to answer simple  questions. On direct questioning, patient denied any  resting ongoing chest pain, resting SOB, hemoptysis, productive cough, fever, ongoing palpitation, active abdominal pain, hematemesis, rectal bleeding, shirley, hematuria, any other  and GI complaints, and any new focal neuro deficits .     ROS:  Pertinent positives and negatives are noted in the HPI, all other systems are reviewed and negative    PMH:  Past Medical History:   Diagnosis Date    BRCA1 gene mutation positive     Patient put herself and it was postive per patient    Breast cancer ([address]     Cancer ([address]     Breast    Diabetes mellitus ([address]     Headache(784.0)     migraines    Hyperlipidemia     Hypertension     Kidney stone     Lumbar herniated disc 2012    x3    Migraine     Pancreatitis 2022    tx at [facility]       Surgical History:  Past Surgical History:   Procedure Laterality Date    ANKLE SURGERY      BREAST BIOPSY      BREAST LUMPECTOMY      CHOLECYSTECTOMY      HIP FRACTURE SURGERY Left 2022 LEFT HIP OPEN REDUCTION INTERNAL FIXATION (SHARATH TABLE) performed by Kathleen Howe DO at [facility] (CERVIX STATUS UNKNOWN)      LEEP      4 PROCEDURES    TIBIA FRACTURE SURGERY      TONSILLECTOMY      TUBAL LIGATION         Medications Prior to Admission:    Prior to Admission medications    Medication Sig Start Date End Date Taking? Authorizing Provider   methocarbamol (ROBAXIN) 750 MG tablet Take 1 tablet by mouth 3 times daily 1/11/23 2/10/23  Sanchez Fisher MD   metFORMIN (GLUCOPHAGE) 1000 MG tablet Take 1 tablet by mouth 2 times daily (with meals) 1/11/23 4/11/23  Sanchez Fisher MD   dulaglutide (TRULICITY) 1.5 KP/2.4AP SC injection Inject 0.5 mLs into the skin once a week 1/11/23 2/10/23  Sanchez Fisher MD   pantoprazole (PROTONIX) 40 MG tablet Take 1 tablet by mouth every morning (before breakfast) 1/11/23 4/11/23  Sanchez Fisher MD   atorvastatin (LIPITOR) 40 MG tablet take 1 tablet by mouth once daily 1/11/23   Sanchez Fisher MD   glucose monitoring (FREESTYLE FREEDOM) kit 1 kit by Does not apply route daily Please dispense test glucometer that is covered by insurance  Patient not taking: Reported on 1/11/2023 1/11/23 2/10/23  Sanchez Fisher MD   blood glucose monitor strips Test 2 times a day & as needed for symptoms of irregular blood glucose. Dispense sufficient amount for indicated testing frequency plus additional to accommodate PRN testing needs.  Please dispense what is covered by insurance 1/11/23   Sanchez Fisher MD   Lancets MISC 1 each by Does not apply route 2 times daily 1/11/23   Sanchez Fisher MD   insulin glargine (LANTUS SOLOSTAR) 100 UNIT/ML injection pen Inject 36 Units into the skin 2 times daily 1/11/23 2/10/23  Sanchez Fisher MD   polyethylene glycol (GLYCOLAX) 17 g packet Take 17 g by mouth daily as needed for Constipation  Patient not taking: Reported on 1/11/2023 12/21/22 1/20/23  Sofia Cantrell DO   aspirin EC 81 MG EC tablet Take 1 tablet by mouth 2 times daily for 28 days 12/16/22 1/13/23  Bethany Licona,    doxazosin (CARDURA) 1 MG tablet  8/30/22   Historical Provider, MD   Insulin Pen Needle 32G X 6 MM MISC 1 Device by Does not apply route daily 6/24/22   Octavio Reid MD   B-D INS SYRINGE 0.5CC/31GX5/16 31G X 5/16\" 0.5 ML MISC  5/3/16   Historical Provider, MD       Allergies:    Patient has no known allergies. Social History:    reports that she has been smoking cigarettes. She has a 0.50 pack-year smoking history. She has never used smokeless tobacco. She reports that she does not currently use drugs after having used the following drugs: Marijuana Lum Olden). She reports that she does not drink alcohol. Family History:   family history includes Alcohol Abuse in her father; Asthma in her daughter and son; Brain Cancer in her daughter; Cancer in her father, maternal grandfather, maternal grandmother, paternal grandfather, and paternal grandmother; Diabetes in her maternal grandmother and mother; Heart Attack in her father; Hypotension in her father; Kidney Cancer in her maternal grandfather; Yanely Crock in her father. PHYSICAL EXAM:  Vitals:  /68   Pulse 90   Temp 97.4 °F (36.3 °C)   Resp 18   Wt 169 lb 15.6 oz (77.1 kg)   LMP 08/16/2011   SpO2 98%   BMI 26.62 kg/m²   GENERAL: No acute distress, Alert and awake, Afebrile, Appears tired and weak otherwise hemodynamically stable at present. HEENT: PERRLA, no icterus. OP clear and no exudates. NECK: Supple  no carotid/ophthalmic bruits, JVD None. RESPIRATORY:  Bilateral equal vesicular breath sound with no wheezing. Lung bases are clear. HEART: No tachycardia at bedside and regular rhythm. Normal S1 and S2, No S3 or S4 is audible. No pulsation, thrills, murmur or friction rubs. ABDOMEN: Soft, nondistended, nontender. No hepatomegaly or splenomegaly. No CVA tenderness on the both sides. Bowel sound is present. EXTREMITIES: All peripheral pulses are present.  Has swelling and area of induration and in left leg and lower thingh, no palpable crepitus  NEUROLOGY: Alert and awake. No new focal neuro deficit. Bilateral Pupil is equal and reactive to light. CN-ii-xii otherwise grossly intact. Motor and Sensory: Grossly Intact bilaterally with no new focal signs     LABS:  Recent Labs     01/16/23  2219   WBC 12.2*   RBC 4.42   HGB 12.0   HCT 39.7   MCV 89.8   MCH 27.1   MCHC 30.2*   RDW 13.9      MPV 10.2     Recent Labs     01/16/23  2219      K 4.5      CO2 26   BUN 9   CREATININE 0.5   GLUCOSE 243*   CALCIUM 8.9     No results for input(s): POCGLU in the last 72 hours.   Results for orders placed or performed during the hospital encounter of 01/16/23   CBC with Auto Differential   Result Value Ref Range    WBC 12.2 (H) 4.5 - 11.5 E9/L    RBC 4.42 3.50 - 5.50 E12/L    Hemoglobin 12.0 11.5 - 15.5 g/dL    Hematocrit 39.7 34.0 - 48.0 %    MCV 89.8 80.0 - 99.9 fL    MCH 27.1 26.0 - 35.0 pg    MCHC 30.2 (L) 32.0 - 34.5 %    RDW 13.9 11.5 - 15.0 fL    Platelets 947 647 - 389 E9/L    MPV 10.2 7.0 - 12.0 fL    Neutrophils % 70.9 43.0 - 80.0 %    Immature Granulocytes % 0.3 0.0 - 5.0 %    Lymphocytes % 23.9 20.0 - 42.0 %    Monocytes % 3.8 2.0 - 12.0 %    Eosinophils % 0.6 0.0 - 6.0 %    Basophils % 0.5 0.0 - 2.0 %    Neutrophils Absolute 8.66 (H) 1.80 - 7.30 E9/L    Immature Granulocytes # 0.04 E9/L    Lymphocytes Absolute 2.91 1.50 - 4.00 E9/L    Monocytes Absolute 0.46 0.10 - 0.95 E9/L    Eosinophils Absolute 0.07 0.05 - 0.50 E9/L    Basophils Absolute 0.06 0.00 - 0.20 V9/Z   Basic Metabolic Panel   Result Value Ref Range    Sodium 135 132 - 146 mmol/L    Potassium 4.5 3.5 - 5.0 mmol/L    Chloride 101 98 - 107 mmol/L    CO2 26 22 - 29 mmol/L    Anion Gap 8 7 - 16 mmol/L    Glucose 243 (H) 74 - 99 mg/dL    BUN 9 6 - 20 mg/dL    Creatinine 0.5 0.5 - 1.0 mg/dL    Est, Glom Filt Rate >60 >=60 mL/min/1.73    Calcium 8.9 8.6 - 10.2 mg/dL   Protime-INR   Result Value Ref Range Protime 12.5 (H) 9.3 - 12.4 sec    INR 1.1    APTT   Result Value Ref Range    aPTT 39.3 (H) 24.5 - 35.1 sec   Troponin   Result Value Ref Range    Troponin, High Sensitivity 13 (H) 0 - 9 ng/L   Brain Natriuretic Peptide   Result Value Ref Range    Pro- (H) 0 - 125 pg/mL   Beta-Hydroxybutyrate   Result Value Ref Range    Beta-Hydroxybutyrate 0.10 0.02 - 0.27 mmol/L   pH, venous   Result Value Ref Range    pH, Kristopher 7.38 7.35 - 7.45   Troponin   Result Value Ref Range    Troponin, High Sensitivity 14 (H) 0 - 9 ng/L   Lactate, Sepsis   Result Value Ref Range    Lactic Acid, Sepsis 1.4 0.5 - 1.9 mmol/L   Sedimentation Rate   Result Value Ref Range    Sed Rate 27 (H) 0 - 20 mm/Hr   EKG 12 Lead   Result Value Ref Range    Ventricular Rate 111 BPM    Atrial Rate 111 BPM    P-R Interval 146 ms    QRS Duration 78 ms    Q-T Interval 316 ms    QTc Calculation (Bazett) 429 ms    P Axis 50 degrees    R Axis 46 degrees    T Axis 22 degrees     ED Course as of 01/17/23 0523   Mon Jan 16, 2023   0104 ATTENDING PROVIDER ATTESTATION:     I have personally performed and/or participated in the history, exam, medical decision making, and procedures and agree with all pertinent clinical information unless otherwise noted.      I have also reviewed and agree with the past medical, family and social history unless otherwise noted.    I have discussed this patient in detail with the resident, and provided the instruction and education regarding patient here complaining of a couple days of left medial thigh pain with mild swelling.  Had hip surgery a few weeks ago, no hip pain and no purulent drainage from incision with no fevers, sweats or chills.  Denies any chest pain, palpitations or shortness of breath.  States her blood sugars have been well controlled in the 100s.  Denies fevers, sweats or chills..  My findings/plan: Patient resting comfortably no distress.  Mildly tachycardic.  Lungs are clear and equal.  Abdomen nontender.  No  CVA tenderness. General mild left medial thigh tenderness and swelling compared to the right. No appreciable erythema to the area or increased warmth. Minimally delayed cap refill to the left foot although is present and sensation grossly intact and moving the foot and digits well with no difficulty. No pretibial edema or calf pain bilaterally. No calf pain bilaterally. No midline cervical, thoracic or lumbar spine tenderness. No focal neurologic deficit. [NC]   0951 Left foot arterial flow intact, nurses Doppler dorsal pedal pulses on the patient. [NC]   1912 No DVT seen on left lower extremity [JG]   2329 EKG: This EKG is signed by emergency department physician. Rate: 111  Rhythm: Sinus  Interpretation: non-specific EKG  Comparison: stable as compared to patient's most recent EKG from 12/16/2022     [JG]   Tue Jan 17, 2023   0017 Patient had blood pressure much is 91/76, however this was taken at the patient's wrist of her radial blood pressure, cuff was moved to her upper arm and is 122/88. She does have a mild leukocytosis, and on exam there does appear to be some induration on the left posterior region of the thigh compared to the right, will obtain a CT of the femur to evaluate better [JG]   0018 Reevaluated at area of swelling the patient was complaining exam, there does not appear to be some induration on the left posterior region of patient's thigh region compared to the right, will obtain a CT of that leg [JG]   0059 On my interpretation of patient's CTA pulmonary study there is no large central PEs. [JG]   0105 On my visualization of the patient CT femur interpretation on the left lateral aspect of the leg there does appear to be a fluid collection near the hardware site of patient's femur, that appears to be tracking all the way out near the skin.  [JG]   0115 Dr. Kanchan Oakes will admit patient [JG]   365 29-year-old female presenting to the emergency department for left leg swelling, redness and pain. Initially tachycardic upon arrival, and concern for possible DVT given the recent femur fracture and repair. Ultrasound of the leg was negative. Patient was noted to have a leukocytosis, was having subjective chills at home, and given the leg swelling and erythema and pain, concern for possible postop infection. Her feet were also notably cold, concerning for possible underlying infection with clamping down of peripheral blood vessels, pulses were dopplerable. Patient was sent to CT, CT pulmonary study was ordered as well given the tachycardia, and no obvious PE was seen on CT imaging, awaiting final radiology read, but did appear to have a area of fluid, concerning for abscess given the acute nature of patient's symptoms developing over past few days, along with chills, leukocytosis, near the area of hardware in patient's left leg. Patient was started on antibiotics, given the recent surgery, was covered for Pseudomonas, as well as MRSA, has a history of ESBL previously, so was given bank, Flagyl, for anaerobic coverage, as well as cefepime. Blood cultures were obtained. Patient was admitted to hospital for further work-up and management by internal medicine for possible postop abscess, orthopedic surgery was consulted. [JG]      ED Course User Index  [JG] Ella PresMD stoney  [NC] Monster An DO     Radiology: CT FEMUR LEFT W CONTRAST    Result Date: 1/17/2023  EXAMINATION: CT OF THE LEFT FEMUR WITH CONTRAST 1/17/2023 12:40 am TECHNIQUE: CT of the left femur was performed with the administration of intravenous contrast.  Multiplanar reformatted images are provided for review. Automated exposure control, iterative reconstruction, and/or weight based adjustment of the mA/kV was utilized to reduce the radiation dose to as low as reasonably achievable. COMPARISON: None.  HISTORY ORDERING SYSTEM PROVIDED HISTORY: l medial/posterior area of induration, pain, swelling, post op femur repair, eval abscess TECHNOLOGIST PROVIDED HISTORY: Reason for exam:->l medial/posterior area of induration, pain, swelling, post op femur repair, eval abscess Decision Support Exception - unselect if not a suspected or confirmed emergency medical condition->Emergency Medical Condition (MA) FINDINGS: Status post left femoral intertrochanteric fracture fixation. Hardware appears in expected position without evidence of loosening or failure. There is evidence of progressive interval healing of the intertrochanteric fracture. There is an overlying elongated fluid collection within the posterolateral thigh, which measures 4.9 x 2.0 cm in maximal axial dimensions and 6.0 cm cranial caudally. There is extensive subcutaneous stranding. Small fluid collection overlying the proximal left femur surgical site in the posterolateral thigh. Surrounding subcutaneous stranding. CTA PULMONARY W CONTRAST    Result Date: 1/17/2023  EXAMINATION: CTA OF THE CHEST 1/17/2023 12:40 am TECHNIQUE: CTA of the chest was performed after the administration of intravenous contrast.  Multiplanar reformatted images are provided for review. MIP images are provided for review. Automated exposure control, iterative reconstruction, and/or weight based adjustment of the mA/kV was utilized to reduce the radiation dose to as low as reasonably achievable. COMPARISON: None. HISTORY: ORDERING SYSTEM PROVIDED HISTORY: r/o pe TECHNOLOGIST PROVIDED HISTORY: Reason for exam:->r/o pe Decision Support Exception - unselect if not a suspected or confirmed emergency medical condition->Emergency Medical Condition (MA) FINDINGS: Pulmonary Arteries: Pulmonary arteries are adequately opacified for evaluation. No evidence of intraluminal filling defect to suggest pulmonary embolism. Main pulmonary artery is normal in caliber. Mediastinum: No evidence of mediastinal lymphadenopathy. The heart and pericardium demonstrate no acute abnormality.   There is no acute abnormality of the thoracic aorta. Lungs/pleura: The lungs are without acute process.  No focal consolidation or pulmonary edema.  No evidence of pleural effusion or pneumothorax. Upper Abdomen: Limited images of the upper abdomen are unremarkable. Soft Tissues/Bones: No acute bone or soft tissue abnormality.     No evidence of pulmonary embolism or acute pulmonary abnormality.     US DUP LOWER EXTREMITY LEFT JYOTHI    Result Date: 1/16/2023  EXAMINATION: DUPLEX VENOUS ULTRASOUND OF THE LEFT LOWER EXTREMITY 1/16/2023 10:56 pm TECHNIQUE: Duplex ultrasound using B-mode/gray scaled imaging and Doppler spectral analysis and color flow was obtained of the deep venous structures of the left lower extremity. COMPARISON: None. HISTORY: ORDERING SYSTEM PROVIDED HISTORY: Discomfort TECHNOLOGIST PROVIDED HISTORY: Reason for exam:->Discomfort What reading provider will be dictating this exam?->CRC FINDINGS: The calf veins are difficult to visualize due to their small caliber.  The visualized veins of the left lower extremity are patent and free of echogenic thrombus. The veins demonstrate good compressibility with normal color flow study and spectral analysis.     No evidence of DVT in the left lower extremity. RECOMMENDATIONS: Unavailable     ASSESSMENT:    Present on Admission:   Leg abscess    PLAN:  # Left lower extremity infection - with possible abscess - s/p recent femur surgery   WBC elevated   CT showed small fluid collection overlying the proximal left femur surgical site with surrounding stranding   US negative for DVT  Cultures sent   IV Vanc + Zosyn   Ortho consult  # DM moderately controlled  On Low carb diet  Nutritional and dietary counseling   Placed on sliding scale inulin   Bedside glucose before meals and bedtime and adjust insulin accordingly  # Hypertension   Currently better controlled  Will resume home medications as needed.  Monitor vitals and adjust BP meds as needed  # Rest of the chronic medical  problems are stable and will be managed with appropriately with home medications, placed nursing communication order to verify home medications before giving them to the patient. # Fall Precaution: Yes  # Disposition: Home/primary residence   # Code Status: Full code  # DVT Prophylaxis : Lovenox SC  The patient at bedside was counseled about clinical status, laboratory/imaging results, diagnoses, medication side effects, risk, and treatment plan, all questions were answered to patient's satisfaction and verbalized understanding  SIGNATURE: Juancarlos Bojorquez MD PATIENT NAME: Rachael ORTIZ Emileelliot Grahampetey #: Hospitalist on call MRN: [id number]     Disclaimer: Portions of this note may have been generated using Dragon voice recognition software. Reasonable efforts were made to correct any dictation errors that resulted due to the programming of this software but some may still be present.

## 2023-01-17 NOTE — CONSULTS
Department of Orthopedic Surgery  Resident Consult Note          Reason for Consult: Left thigh swelling and pain    HISTORY OF PRESENT ILLNESS:       Patient is a 41 y.o. female who presents with left thigh swelling and pain that started a few days ago. Patient states that 1 month ago she had a left hip fracture which was treated with a cephalomedullary nail by Dr. Parish Cheema. States that she had been doing well until the past few days where she noticed increased swelling to the left thigh as well as some mild pain. Has not had any fevers though occasional subjective chills. Denies numbness/tingling/paresthesias. Denies any other orthopedic complaints at this time.       Past Medical History:        Diagnosis Date    BRCA1 gene mutation positive     Patient put herself and it was postive per patient    Breast cancer ([facility] [address]     Cancer ([facility] [address]     Breast    Diabetes mellitus ([facility] [address]     Headache(784.0)     migraines    Hyperlipidemia     Hypertension     Kidney stone     Lumbar herniated disc 2012    x3    Migraine     Pancreatitis 07/2022    tx at [facility]     Past Surgical History:        Procedure Laterality Date    ANKLE SURGERY      BREAST BIOPSY      BREAST LUMPECTOMY      CHOLECYSTECTOMY      HIP FRACTURE SURGERY Left 12/16/2022    LEFT HIP OPEN REDUCTION INTERNAL FIXATION (SHARATH TABLE) performed by Madhu Bowen DO at . [address] (CERVIX STATUS UNKNOWN)      LEEP      4 PROCEDURES    TIBIA FRACTURE SURGERY      TONSILLECTOMY      TUBAL LIGATION       Current Medications:   Current Facility-Administered Medications: 0.9 % sodium chloride infusion, , IntraVENous, Continuous  promethazine (PHENERGAN) tablet 12.5 mg, 12.5 mg, Oral, Q6H PRN **OR** ondansetron (ZOFRAN) injection 4 mg, 4 mg, IntraVENous, Q6H PRN  polyethylene glycol (GLYCOLAX) packet 17 g, 17 g, Oral, Daily PRN  acetaminophen (TYLENOL) tablet 650 mg, 650 mg, Oral, Q6H PRN **OR** acetaminophen (TYLENOL) suppository 650 mg, 650 mg, Rectal, Q6H PRN  cefepime (MAXIPIME) 2,000 mg in sodium chloride 0.9 % 50 mL IVPB ([id number]), 2,000 mg, IntraVENous, Q12H  metronidazole (FLAGYL) 500 mg in 0.9% NaCl 100 mL IVPB premix, 500 mg, IntraVENous, Q8H  HYDROmorphone HCl PF (DILAUDID) injection 0.5 mg, 0.5 mg, IntraVENous, Q4H PRN  aspirin EC tablet 81 mg, 81 mg, Oral, BID  methocarbamol (ROBAXIN) tablet 750 mg, 750 mg, Oral, TID  pantoprazole (PROTONIX) tablet 40 mg, 40 mg, Oral, QAM AC  atorvastatin (LIPITOR) tablet 20 mg, 20 mg, Oral, Nightly  doxazosin (CARDURA) tablet 1 mg, 1 mg, Oral, Daily  insulin lispro (HUMALOG) injection vial 0-4 Units, 0-4 Units, SubCUTAneous, Q4H  glucose chewable tablet 16 g, 4 tablet, Oral, PRN  dextrose bolus 10% 125 mL, 125 mL, IntraVENous, PRN **OR** dextrose bolus 10% 250 mL, 250 mL, IntraVENous, PRN  glucagon (rDNA) injection 1 mg, 1 mg, SubCUTAneous, PRN  dextrose 10 % infusion, , IntraVENous, Continuous PRN  vancomycin (VANCOCIN) 1,250 mg in dextrose 5 % 250 mL IVPB, 1,250 mg, IntraVENous, Q12H  [Held by provider] insulin glargine (LANTUS) injection vial 25 Units, 25 Units, SubCUTAneous, BID  Allergies:  Patient has no known allergies. Social History:   TOBACCO:   reports that she has been smoking cigarettes. She has a 0.50 pack-year smoking history. She has never used smokeless tobacco.  ETOH:   reports no history of alcohol use. DRUGS:   reports that she does not currently use drugs after having used the following drugs: Marijuana Jason Carrera).   ACTIVITIES OF DAILY LIVING:    OCCUPATION:    Family History:       Problem Relation Age of Onset    Asthma Daughter     Brain Cancer Daughter     Diabetes Mother     Alcohol Abuse Father     Heart Attack Father     Hypotension Father     Cancer Father         lung    Lung Cancer Father     Cancer Maternal Grandmother     Diabetes Maternal Grandmother     Cancer Maternal Grandfather     Kidney Cancer Maternal Grandfather     Cancer Paternal Grandmother     Cancer Paternal Grandfather     Asthma Son        REVIEW OF SYSTEMS:  CONSTITUTIONAL:  negative for  fevers, chills  EYES:  negative for blurred vision, visual disturbance  HEENT:  negative for  hearing loss, voice change  RESPIRATORY:  negative for  dyspnea, wheezing  CARDIOVASCULAR:  negative for  chest pain, palpitations  GASTROINTESTINAL:  negative for nausea, vomiting  GENITOURINARY:  negative for frequency, urinary incontinence  HEMATOLOGIC/LYMPHATIC:  negative for bleeding and petechiae  MUSCULOSKELETAL:  positive for left thigh swelling, s/p left hip cephalomedullary nail  NEUROLOGICAL:  negative for headaches, dizziness  BEHAVIOR/PSYCH:  negative for increased agitation and anxiety    PHYSICAL EXAM:    VITALS:  /74   Pulse 85   Temp 97.5 °F (36.4 °C) (Oral)   Resp 18   Wt 169 lb 15.6 oz (77.1 kg)   LMP 08/16/2011   SpO2 98%   BMI 26.62 kg/m²   CONSTITUTIONAL:  awake, alert, cooperative, no apparent distress, and appears stated age  MUSCULOSKELETAL:  Left lower Extremity:  Surgical wounds over the left hip and thigh are healing well, no drainage, no significant erythema. I do not appreciate any significant fluctuance. Compartments are soft and compressible  She does have subtle swelling about the left thigh when compared to the right  No significant calf tenderness  Relatively painless range of motion of the hip and knee  Toes are warm and perfused with brisk capillary refill  Sensation to light touch is grossly intact in the peroneal, sural, saphenous, tibial nerves  Motor intact DF/PF/EHL/knee extension    Secondary Exam:   bilateralUE: No obvious signs of trauma. -TTP to fingers, hand, wrist, forearm, elbow, humerus, shoulder or clavicle. -- Patient able to flex/extend fingers, wrist, elbow and shoulder with active and passive ROM without pain, +2/4 Radial pulse, cap refill <3sec, +AIN/PIN/Radial/Ulnar/Median N, distal sensation grossly intact to C4-T1 dermatomes, compartments soft and compressible. rightLE: No obvious signs of trauma. -TTP to foot, ankle, leg, knee, thigh, hip.-- Patient able to flex/extend toes, ankle, knee and hip with active and passive ROM without pain,+2/4 DP & PT pulses, cap refill <3sec, +5/5 PF/DF/EHL, distal sensation grossly intact to L4-S1 dermatomes, compartments soft and compressible. Pelvis: -TTP, -Log roll, -Heel strike     DATA:    CBC:   Lab Results   Component Value Date/Time    WBC 12.2 01/16/2023 10:19 PM    RBC 4.42 01/16/2023 10:19 PM    HGB 12.0 01/16/2023 10:19 PM    HCT 39.7 01/16/2023 10:19 PM    MCV 89.8 01/16/2023 10:19 PM    MCH 27.1 01/16/2023 10:19 PM    MCHC 30.2 01/16/2023 10:19 PM    RDW 13.9 01/16/2023 10:19 PM     01/16/2023 10:19 PM    MPV 10.2 01/16/2023 10:19 PM     PT/INR:    Lab Results   Component Value Date/Time    PROTIME 12.5 01/16/2023 10:19 PM    INR 1.1 01/16/2023 10:19 PM       Radiology Review:  Contrast enhanced CT scan of the left femur reviewed. There is a fluid collection adjacent to the distal interlocking screw. Does not appear any significant rim enhancement that would suggest abscess. There is fat stranding within the soft tissues which may reflect edema. Orthopedic hardware is well-maintained, comminuted intertrochanteric fracture shows no interval displacement    IMPRESSION:  S/p left cephalomedullary nail for left intertrochanteric hip fracture on 12/16  Hematoma versus abscess    PLAN:  Await infection labs, CRP and blood cultures  Patient does have a mild elevation in her WBC, however her surgical wounds are healing well and there is no drainage.   She is afebrile  Ultrasound was negative for DVT  Patient was admitted to medicine for IV antibiotics, will follow examined discussed with attending, further orthopedic recommendations are forthcoming, for now weightbearing as tolerated to the left lower extremity  Discuss with attending    Jorge Armstrong DO , PGY-2  1/17/23    Electronically signed by Gautam Riojas DO on 1/17/2023 at 8:32 AM

## 2023-01-17 NOTE — PROGRESS NOTES
Patient admitted early this morning with concern for abscess associated with prior ORIF of the left hip. Seen and examined this afternoon. Left hip incisions with very slight induration but no erythema or drainage. Prior to admission, she experienced significant pain with ambulation. CT of the hip in the ED showed fluid collection with mild stranding. CTA of the chest without PE. Lower extremity ultrasound without DVT. Inflammatory markers mildly elevated, however she is afebrile, vital signs within normal limits, without leukocytosis. Orthopedic surgery evaluated him and have low suspicion for abscess. We will discontinue IV antibiotics. Observe overnight, recheck labs tomorrow. Weight bearing restrictions per orthopedic surgery. Possible discharge home tomorrow. Will call patient's  for update per patient request.       NOTE: Portions of this report were transcribed using voice recognition software. Every effort was made to ensure accuracy; however, inadvertent computerized transcription errors may be present.

## 2023-01-17 NOTE — PROGRESS NOTES
Date:2023  Patient Name: Ham Phillips  MRN: [id number]  : 1975  ROOM #: [id number]    Occupational Therapy order received, chart reviewed and evaluation attempted this date. Patient underwent open reduction and internal fixation of left hip fracture with interlocking gamma nail 22. Upon initial conversation with patient in supine, patient mentioned she was told in her follow-up appointment with orthopedic surgeon that she is to be partial weight bearing however orthopedic note from 23 states patient is weight bearing as tolerated. NGUYEN Moody informed and aware. OT will await weight bearing clarification and orders prior to initiating OT evaluation and treatment. Thank you.      Amanda Argueta OTR/L #[id number]

## 2023-01-17 NOTE — PROGRESS NOTES
Patient voiced that her weight bearing status on her left leg was partial weight bearing at her follow up appointment with Dr. Kathleen Norris. Perfect serve message sent to the resident,, regarding weight bearing status. Perfect serve message read and responded, patient is partial weight bearing on left leg. Notified pt of status change. Electronically signed by Regla Montez RN on 1/17/2023 at 3:44 PM

## 2023-01-17 NOTE — ED NOTES
Humalog to be re-timed and 0445 not given.      Lucia Dillard RN  01/17/23 0537       Lucia Dillard RN  01/17/23 0548

## 2023-01-17 NOTE — ED NOTES
Pt to [address] by transport at this time.       Karolina Drake RN  01/16/23 8019

## 2023-01-18 VITALS
HEART RATE: 78 BPM | TEMPERATURE: 98 F | DIASTOLIC BLOOD PRESSURE: 64 MMHG | OXYGEN SATURATION: 96 % | BODY MASS INDEX: 26.42 KG/M2 | RESPIRATION RATE: 20 BRPM | SYSTOLIC BLOOD PRESSURE: 112 MMHG | WEIGHT: 168.3 LBS | HEIGHT: 67 IN

## 2023-01-18 PROBLEM — M79.89 LEFT LEG SWELLING: Status: ACTIVE | Noted: 2023-01-18

## 2023-01-18 LAB
ALBUMIN SERPL-MCNC: 3.2 G/DL (ref 3.5–5.2)
ALP BLD-CCNC: 101 U/L (ref 35–104)
ALT SERPL-CCNC: 6 U/L (ref 0–32)
ANION GAP SERPL CALCULATED.3IONS-SCNC: 8 MMOL/L (ref 7–16)
AST SERPL-CCNC: 9 U/L (ref 0–31)
BASOPHILS ABSOLUTE: 0.05 E9/L (ref 0–0.2)
BASOPHILS RELATIVE PERCENT: 0.7 % (ref 0–2)
BILIRUB SERPL-MCNC: <0.2 MG/DL (ref 0–1.2)
BUN BLDV-MCNC: 7 MG/DL (ref 6–20)
CALCIUM SERPL-MCNC: 9 MG/DL (ref 8.6–10.2)
CHLORIDE BLD-SCNC: 103 MMOL/L (ref 98–107)
CO2: 25 MMOL/L (ref 22–29)
CREAT SERPL-MCNC: 0.5 MG/DL (ref 0.5–1)
EOSINOPHILS ABSOLUTE: 0.17 E9/L (ref 0.05–0.5)
EOSINOPHILS RELATIVE PERCENT: 2.3 % (ref 0–6)
GFR SERPL CREATININE-BSD FRML MDRD: >60 ML/MIN/1.73
GLUCOSE BLD-MCNC: 155 MG/DL (ref 74–99)
HCT VFR BLD CALC: 37.4 % (ref 34–48)
HEMOGLOBIN: 11 G/DL (ref 11.5–15.5)
IMMATURE GRANULOCYTES #: 0.04 E9/L
IMMATURE GRANULOCYTES %: 0.5 % (ref 0–5)
LYMPHOCYTES ABSOLUTE: 2.43 E9/L (ref 1.5–4)
LYMPHOCYTES RELATIVE PERCENT: 32.6 % (ref 20–42)
MCH RBC QN AUTO: 26.6 PG (ref 26–35)
MCHC RBC AUTO-ENTMCNC: 29.4 % (ref 32–34.5)
MCV RBC AUTO: 90.6 FL (ref 80–99.9)
METER GLUCOSE: 170 MG/DL (ref 74–99)
MONOCYTES ABSOLUTE: 0.5 E9/L (ref 0.1–0.95)
MONOCYTES RELATIVE PERCENT: 6.7 % (ref 2–12)
NEUTROPHILS ABSOLUTE: 4.27 E9/L (ref 1.8–7.3)
NEUTROPHILS RELATIVE PERCENT: 57.2 % (ref 43–80)
PDW BLD-RTO: 13.9 FL (ref 11.5–15)
PLATELET # BLD: 353 E9/L (ref 130–450)
PMV BLD AUTO: 10.7 FL (ref 7–12)
POTASSIUM SERPL-SCNC: 4.1 MMOL/L (ref 3.5–5)
RBC # BLD: 4.13 E12/L (ref 3.5–5.5)
SODIUM BLD-SCNC: 136 MMOL/L (ref 132–146)
TOTAL PROTEIN: 5.9 G/DL (ref 6.4–8.3)
WBC # BLD: 7.5 E9/L (ref 4.5–11.5)

## 2023-01-18 PROCEDURE — 6370000000 HC RX 637 (ALT 250 FOR IP): Performed by: INTERNAL MEDICINE

## 2023-01-18 PROCEDURE — 80053 COMPREHEN METABOLIC PANEL: CPT

## 2023-01-18 PROCEDURE — 36415 COLL VENOUS BLD VENIPUNCTURE: CPT

## 2023-01-18 PROCEDURE — 6360000002 HC RX W HCPCS: Performed by: INTERNAL MEDICINE

## 2023-01-18 PROCEDURE — 97165 OT EVAL LOW COMPLEX 30 MIN: CPT | Performed by: OCCUPATIONAL THERAPIST

## 2023-01-18 PROCEDURE — 99239 HOSP IP/OBS DSCHRG MGMT >30: CPT | Performed by: INTERNAL MEDICINE

## 2023-01-18 PROCEDURE — 85025 COMPLETE CBC W/AUTO DIFF WBC: CPT

## 2023-01-18 PROCEDURE — 82962 GLUCOSE BLOOD TEST: CPT

## 2023-01-18 RX ADMIN — HYDROMORPHONE HYDROCHLORIDE 0.5 MG: 1 INJECTION, SOLUTION INTRAMUSCULAR; INTRAVENOUS; SUBCUTANEOUS at 03:52

## 2023-01-18 RX ADMIN — ASPIRIN 81 MG: 81 TABLET, COATED ORAL at 09:14

## 2023-01-18 RX ADMIN — ONDANSETRON 4 MG: 2 INJECTION INTRAMUSCULAR; INTRAVENOUS at 07:53

## 2023-01-18 RX ADMIN — METHOCARBAMOL 750 MG: 500 TABLET ORAL at 09:13

## 2023-01-18 RX ADMIN — INSULIN GLARGINE 25 UNITS: 100 INJECTION, SOLUTION SUBCUTANEOUS at 09:13

## 2023-01-18 RX ADMIN — ENOXAPARIN SODIUM 40 MG: 100 INJECTION SUBCUTANEOUS at 09:15

## 2023-01-18 RX ADMIN — HYDROMORPHONE HYDROCHLORIDE 0.5 MG: 1 INJECTION, SOLUTION INTRAMUSCULAR; INTRAVENOUS; SUBCUTANEOUS at 07:52

## 2023-01-18 RX ADMIN — PANTOPRAZOLE SODIUM 40 MG: 40 TABLET, DELAYED RELEASE ORAL at 05:58

## 2023-01-18 RX ADMIN — ACETAMINOPHEN 650 MG: 325 TABLET ORAL at 09:15

## 2023-01-18 RX ADMIN — DOXAZOSIN 1 MG: 1 TABLET ORAL at 09:14

## 2023-01-18 ASSESSMENT — PAIN DESCRIPTION - DESCRIPTORS
DESCRIPTORS: ACHING;CRAMPING;DISCOMFORT
DESCRIPTORS: THROBBING
DESCRIPTORS: ACHING;CRAMPING;DISCOMFORT

## 2023-01-18 ASSESSMENT — PAIN DESCRIPTION - ORIENTATION
ORIENTATION: UPPER;LEFT
ORIENTATION: LEFT
ORIENTATION: LEFT

## 2023-01-18 ASSESSMENT — PAIN SCALES - GENERAL
PAINLEVEL_OUTOF10: 10
PAINLEVEL_OUTOF10: 6
PAINLEVEL_OUTOF10: 6
PAINLEVEL_OUTOF10: 10
PAINLEVEL_OUTOF10: 6

## 2023-01-18 ASSESSMENT — PAIN - FUNCTIONAL ASSESSMENT
PAIN_FUNCTIONAL_ASSESSMENT: PREVENTS OR INTERFERES WITH MANY ACTIVE NOT PASSIVE ACTIVITIES
PAIN_FUNCTIONAL_ASSESSMENT: PREVENTS OR INTERFERES WITH ALL ACTIVE AND SOME PASSIVE ACTIVITIES

## 2023-01-18 ASSESSMENT — PAIN DESCRIPTION - LOCATION
LOCATION: LEG

## 2023-01-18 NOTE — DISCHARGE SUMMARY
[facility] Physician Discharge Summary       Henny Zheng MD  [address] [place] [address]. [place] [address]  [phone]    Follow up        Activity level: Weightbearing as tolerated left lower extremity per orthopedic recommendation    Diet: ADULT DIET; Regular; 4 carb choices (60 gm/meal)    Labs: Continue to check blood glucose 4 times daily and as needed for symptoms of hypoglycemia. Other routine labs per primary care physician    Condition at discharge: Stable    Dispo: Home      Patient ID:  Ham Phillips  [id number]  66 y.o.  1975    Admit date: 1/16/2023    Discharge date and time:  1/18/2023  9:32 AM    Admission Diagnoses: Principal Problem:    Hematoma of left lower extremity  Resolved Problems:    * No resolved hospital problems. *      Discharge Diagnoses: Principal Problem:    Hematoma of left lower extremity  Resolved Problems:    * No resolved hospital problems. *      Consults:  IP CONSULT TO PHARMACY  IP CONSULT TO ORTHOPEDIC SURGERY    Procedures: None    Hospital Course: This is a 82-year-old female with a history significant for diabetes mellitus, breast cancer, hypertension, hyperlipidemia, migraine headaches that was admitted to the hospital with left lower extremity pain and left groin pain. She was recently admitted for left hip fracture after mechanical fall and discharged on 12/21/2022. She was discharged on aspirin 81 mg twice daily for DVT prophylaxis per orthopedic recommendation. Patient and her  were concerned about the possibility of DVT given pain and mild swelling of the left proximal medial lower extremity. Venous duplex ultrasound done in the ED did not show evidence of DVT. CTA of the chest did not show evidence of PE.  CT of the femur with contrast showed small fluid collection overlying the proximal left femur surgical site and posterior lateral thigh concerning for hematoma versus abscess.   She was started on broad-spectrum IV antibiotics. Orthopedic surgery was consulted. Inflammatory markers were mildly elevated, however she is afebrile and without leukocytosis. Surgical site does not have any signs of infection on exam.  Low suspicion for abscess per orthopedic surgery. Antibiotics discontinued and she was further observed. She remained afebrile and without leukocytosis. Fluid collection likely postoperative hematoma per orthopedic surgery. Stable for discharge to home with close outpatient follow-up. Weightbearing as tolerated per orthopedic recommendation. Discussed continuing aspirin for DVT prophylaxis until she has at baseline, or until further advised by orthopedic surgery or her primary care physician. Discussed seeking immediate medical attention for any fever, drainage from surgical site, worsening swelling, pain, etc.  She states understanding. Discharge Exam:  Vitals:    01/18/23 0230 01/18/23 0352 01/18/23 0422 01/18/23 0752   BP:  118/65     Pulse: 83 82     Resp:  18 17 20   Temp:  98.7 °F (37.1 °C)     TempSrc:  Oral     SpO2:  95%     Weight:       Height:           General Appearance: Alert and oriented x3, does not appear to be in any distress  Skin: warm and dry, no rash or erythema  Head: normocephalic and atraumatic   Eyes: pupils equal, round, and reactive to light, extraocular eye movements intact, conjunctivae normal, anicteric sclera  ENT: External nose and ears normal.  Oral mucosa moist.  Throat clear  Neck: supple and non-tender without mass, no thyromegaly or thyroid nodules, no cervical lymphadenopathy  Pulmonary/Chest: Nonlabored on room air. Clear to auscultation bilaterally  Cardiovascular: Regular rate and rhythm, S1, S2 without murmurs, gallops, clicks, or rubs. No carotid bruits. No appreciable JVD.   Abdomen: soft, non-tender, non-distended, normal bowel sounds, no palpable masses or organomegaly  Extremities: no cyanosis, clubbing or edema  Musculoskeletal: Left hip surgical incisions clean, dry, intact. No significant erythema. Mild subcutaneous induration without fluctuance. Neurologic: No cranial nerve deficit. Speech normal.    I/O last 3 completed shifts: In: 1450 [IV Piggyback:1450]  Out: -   No intake/output data recorded. LABS:  Recent Labs     01/16/23 2219 01/18/23  0717    136   K 4.5 4.1    103   CO2 26 25   BUN 9 7   CREATININE 0.5 0.5   GLUCOSE 243* 155*   CALCIUM 8.9 9.0       Recent Labs     01/16/23 2219 01/18/23  0717   WBC 12.2* 7.5   RBC 4.42 4.13   HGB 12.0 11.0*   HCT 39.7 37.4   MCV 89.8 90.6   MCH 27.1 26.6   MCHC 30.2* 29.4*   RDW 13.9 13.9    353   MPV 10.2 10.7       Imaging:  CT FEMUR LEFT W CONTRAST    Result Date: 1/17/2023  EXAMINATION: CT OF THE LEFT FEMUR WITH CONTRAST 1/17/2023 12:40 am TECHNIQUE: CT of the left femur was performed with the administration of intravenous contrast.  Multiplanar reformatted images are provided for review. Automated exposure control, iterative reconstruction, and/or weight based adjustment of the mA/kV was utilized to reduce the radiation dose to as low as reasonably achievable. COMPARISON: None. HISTORY ORDERING SYSTEM PROVIDED HISTORY: l medial/posterior area of induration, pain, swelling, post op femur repair, eval abscess TECHNOLOGIST PROVIDED HISTORY: Reason for exam:->l medial/posterior area of induration, pain, swelling, post op femur repair, eval abscess Decision Support Exception - unselect if not a suspected or confirmed emergency medical condition->Emergency Medical Condition (MA) FINDINGS: Status post left femoral intertrochanteric fracture fixation. Hardware appears in expected position without evidence of loosening or failure. There is evidence of progressive interval healing of the intertrochanteric fracture.  There is an overlying elongated fluid collection within the posterolateral thigh, which measures 4.9 x 2.0 cm in maximal axial dimensions and 6.0 cm cranial caudally. There is extensive subcutaneous stranding. Small fluid collection overlying the proximal left femur surgical site in the posterolateral thigh. Surrounding subcutaneous stranding. CTA PULMONARY W CONTRAST    Result Date: 1/17/2023  EXAMINATION: CTA OF THE CHEST 1/17/2023 12:40 am TECHNIQUE: CTA of the chest was performed after the administration of intravenous contrast.  Multiplanar reformatted images are provided for review. MIP images are provided for review. Automated exposure control, iterative reconstruction, and/or weight based adjustment of the mA/kV was utilized to reduce the radiation dose to as low as reasonably achievable. COMPARISON: None. HISTORY: ORDERING SYSTEM PROVIDED HISTORY: r/o pe TECHNOLOGIST PROVIDED HISTORY: Reason for exam:->r/o pe Decision Support Exception - unselect if not a suspected or confirmed emergency medical condition->Emergency Medical Condition (MA) FINDINGS: Pulmonary Arteries: Pulmonary arteries are adequately opacified for evaluation. No evidence of intraluminal filling defect to suggest pulmonary embolism. Main pulmonary artery is normal in caliber. Mediastinum: No evidence of mediastinal lymphadenopathy. The heart and pericardium demonstrate no acute abnormality. There is no acute abnormality of the thoracic aorta. Lungs/pleura: The lungs are without acute process. No focal consolidation or pulmonary edema. No evidence of pleural effusion or pneumothorax. Upper Abdomen: Limited images of the upper abdomen are unremarkable. Soft Tissues/Bones: No acute bone or soft tissue abnormality. No evidence of pulmonary embolism or acute pulmonary abnormality.      US DUP LOWER EXTREMITY LEFT JYOTHI    Result Date: 1/16/2023  EXAMINATION: DUPLEX VENOUS ULTRASOUND OF THE LEFT LOWER EXTREMITY 1/16/2023 10:56 pm TECHNIQUE: Duplex ultrasound using B-mode/gray scaled imaging and Doppler spectral analysis and color flow was obtained of the deep venous structures of the left lower extremity. COMPARISON: None. HISTORY: ORDERING SYSTEM PROVIDED HISTORY: Discomfort TECHNOLOGIST PROVIDED HISTORY: Reason for exam:->Discomfort What reading provider will be dictating this exam?->CRC FINDINGS: The calf veins are difficult to visualize due to their small caliber. The visualized veins of the left lower extremity are patent and free of echogenic thrombus. The veins demonstrate good compressibility with normal color flow study and spectral analysis. No evidence of DVT in the left lower extremity.  RECOMMENDATIONS: Unavailable         Patient Instructions:   Current Discharge Medication List        CONTINUE these medications which have NOT CHANGED    Details   methocarbamol (ROBAXIN) 750 MG tablet Take 1 tablet by mouth 3 times daily  Qty: 90 tablet, Refills: 0    Associated Diagnoses: Closed displaced intertrochanteric fracture of left femur with routine healing, subsequent encounter      metFORMIN (GLUCOPHAGE) 1000 MG tablet Take 1 tablet by mouth 2 times daily (with meals)  Qty: 180 tablet, Refills: 1    Associated Diagnoses: Type 2 diabetes mellitus without complication, with long-term current use of insulin ([facility])      dulaglutide (TRULICITY) 1.5 GM/0.2AK SC injection Inject 0.5 mLs into the skin once a week  Qty: 2 mL, Refills: 2    Associated Diagnoses: Type 2 diabetes mellitus without complication, with long-term current use of insulin ([facility])      pantoprazole (PROTONIX) 40 MG tablet Take 1 tablet by mouth every morning (before breakfast)  Qty: 90 tablet, Refills: 0    Associated Diagnoses: Gastroesophageal reflux disease without esophagitis      atorvastatin (LIPITOR) 40 MG tablet take 1 tablet by mouth once daily  Qty: 90 tablet, Refills: 1    Associated Diagnoses: Hyperlipidemia, unspecified hyperlipidemia type      glucose monitoring (FREESTYLE FREEDOM) kit 1 kit by Does not apply route daily Please dispense test glucometer that is covered by insurance  Qty: 1 kit, Refills: 0 Associated Diagnoses: Type 2 diabetes mellitus without complication, without long-term current use of insulin ([facility])      blood glucose monitor strips Test 2 times a day & as needed for symptoms of irregular blood glucose. Dispense sufficient amount for indicated testing frequency plus additional to accommodate PRN testing needs. Please dispense what is covered by insurance  Qty: 100 strip, Refills: 0    Comments: Brand per patient preference. May round up to next available package size. Associated Diagnoses: Type 2 diabetes mellitus without complication, without long-term current use of insulin ([facility])      Lancets MISC 1 each by Does not apply route 2 times daily  Qty: 300 each, Refills: 1    Associated Diagnoses: Type 2 diabetes mellitus without complication, with long-term current use of insulin ([facility])      insulin glargine (LANTUS SOLOSTAR) 100 UNIT/ML injection pen Inject 36 Units into the skin 2 times daily  Qty: 6 Adjustable Dose Pre-filled Pen Syringe, Refills: 3    Associated Diagnoses: Type 2 diabetes mellitus without complication, without long-term current use of insulin ([facility])      polyethylene glycol (GLYCOLAX) 17 g packet Take 17 g by mouth daily as needed for Constipation  Qty: 527 g, Refills: 1      aspirin EC 81 MG EC tablet Take 1 tablet by mouth 2 times daily for 28 days  Qty: 56 tablet, Refills: 0      doxazosin (CARDURA) 1 MG tablet       Insulin Pen Needle 32G X 6 MM MISC 1 Device by Does not apply route daily  Qty: 100 each, Refills: 12    Associated Diagnoses: Type 2 diabetes mellitus with hyperglycemia, with long-term current use of insulin ([facility])      B-D INS SYRINGE 0.5CC/31GX5/16 31G X 5/16\" 0.5 ML MISC Refills: 0               Note that greater than 30 minutes was spent in preparing discharge papers, discussing discharge with patient, medication review, etc.    NOTE: This report was transcribed using voice recognition software.  Every effort was made to ensure accuracy; however, inadvertent computerized transcription errors may be present.     Signed:  Electronically signed by Joseph Cantrell DO on 1/18/2023 at 9:32 AM

## 2023-01-18 NOTE — PROGRESS NOTES
[address]  [facility] [place]. [place]         Date:2023                                                   Patient Name: Tati Brownlee     MRN: [id number]     : 1975     Room: [address] [place], [place] [id number]-       Evaluating OT: Tracee Ramirez OTR/L; [id number]        Specific Provider Orders/Date; Referring Provider: OT eval and treat  Start:  23,   End:  23 104,   ONE TIME,   Standing Count:  1 Occurrences,   MIKAYLA Cantrell DO            Diagnosis:   1. Postoperative abscess    2. Pain of left lower extremity    3.  Left leg swelling         Surgery: S/p left cephalomedullary nail for left intertrochanteric hip fracture on       Pertinent Medical History:        Past Medical History:   Diagnosis Date    BRCA1 gene mutation positive     Patient put herself and it was postive per patient    Breast cancer ([address]     Cancer ([address]     Breast    Diabetes mellitus ([address]     Headache(784.0)     migraines    Hyperlipidemia     Hypertension     Kidney stone     Lumbar herniated disc 2012    x3    Migraine     Pancreatitis 2022    tx at [facility]          Past Surgical History:   Procedure Laterality Date    ANKLE SURGERY      BREAST BIOPSY      BREAST LUMPECTOMY      CHOLECYSTECTOMY      HIP FRACTURE SURGERY Left 2022    LEFT HIP OPEN REDUCTION INTERNAL FIXATION (SHARATH TABLE) performed by Maggy Cerrato DO at [address] [place] [address] (CERVIX STATUS UNKNOWN)      LEEP      4 PROCEDURES    TIBIA FRACTURE SURGERY      TONSILLECTOMY      TUBAL LIGATION         Precautions: PWB L LE-has been PWB for weeks and indep at home with it    Recommended placement: indep home    Assessment of current deficits [x] No deficits    [] Functional mobility  []ADLs  [] Strength               []Cognition     [] Functional transfers   [] IADLs         [] Safety Awareness   []Endurance     [] Fine Coordination              [] Balance      [] Vision/perception   []Sensation      []Gross Motor Coordination  [] ROM  [] Delirium                   [] Motor Control     OT PLAN OF CARE   OT POC based on physician orders, patient diagnosis and results of clinical assessment    Frequency/Duration and OT treatment intervention: none recommended       Recommended Adaptive Equipment:  none     Home Living: Pt lives with spouse in a 1 story home with no steps to enter. Spouse is on disability. Laundry on first floor. Can have additional social support if needed.      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   Enjoys: reading, playing video games, getting a new dog    Pain Level: only \"some with walking or if I apply too much weight\"  Cognition: A&O: 4/4; Follows 3 step directions   Memory:  Intact    Sequencing: Intact    Problem solving:Intact    Judgement/safety:Intact   AM-PAC Daily Activity Inpatient   How much help for putting on and taking off regular lower body clothing?: None  How much help for Bathing?: None  How much help for Toileting?: None  How much help for putting on and taking off regular upper body clothing?: None  How much help for taking care of personal grooming?: None  How much help for eating meals?: None  AM-[facility] Inpatient Daily Activity Raw Score: 24  AM-PAC Inpatient ADL T-Scale Score : 57.54  ADL Inpatient CMS 0-100% Score: 0  ADL Inpatient CMS G-Code Modifier : CH    Functional Assessment:    Initial Eval Status  Date: 1/18/2023  Treatment Status  Date: STGs = LTGs  Time frame: 10-14 days   Feeding Indep  NA-PLOF   Grooming Indep standing at sink  NA-PLOF   UB Dressing Indep  NA-PLOF   LB Dressing Mod Indep with AE-provided due to pt leaving hers here the last stay  NA-PLOF Bathing Indep with sponge bathing  NA-PLOF   Toileting Indep   NA-PLOF   Bed Mobility  Supine to sit: Indep  Sit to supine: Indep   NA-PLOF   Functional Transfers Indep with AD from bed, arm chair and toilet  NA-PLOF   Functional Mobility Indep with AD and PWB for short distance functional mobility throughout the room to simulate house hold distances. NA-PLOF   Balance Sitting:     Static:  Good    Dynamic: Good  Standing: Good  NA-PLOF   Activity Tolerance Good overall with standing tolerance 2-3min with ADLs  NA-PLOF   Visual/  Perceptual Vision: WFL; Glasses present: yes: Change in vision since admission: no     NA-PLOF           Hand Dominance  [x] Right [] Left     AROM (PROM) Strength Additional Info:    RUE  WFL 5/5 good  and wfl FMC/dexterity noted during ADL tasks  Opposition [x] Intact [] Impaired  Finger to nose [x] Intact [] Impaired     LUE WFL 5/5 good  and wfl FMC/dexterity noted during ADL tasks  Opposition [x] Intact [] Impaired  Finger to nose [x] Intact [] Impaired     Hearing: WFL   Sensation:  No c/o numbness or tingling   Tone: WFL   Edema: none    Comments: Upon arrival patient supine in bed. Overall patient demonstrated independence and safety during completion of ADL/functional transfer/mobility tasks that matches that of PLOF. At end of session, patient up in chair with call light and phone within reach, all lines and tubes intact. Pt would not benefit from continued skilled OT services at this time due to intact safety and independence with completion of ADL/IADL tasks for functional independence and quality of life at [employer]. Patient / Family Goal: DC home today      Patient and/or family were instructed on functional diagnosis, prognosis/goals and OT plan of care. Demonstrated good understanding.      Eval Complexity: Low  History: Brief review of medical records and additional review of physical, cognitive, or psychosocial history related to current functional performance  Exam: No performance deficits  Assistance/Modification: No assistance or modifications required to perform tasks. May have comorbidities that affect occupational performance. Time In: 0848  Time Out: 0909  Total Treatment Time: 0    Min Units   OT Eval Low 97165  x  1   OT Eval Medium 65552      OT Eval High 92414      OT Re-Eval [id number]       Therapeutic Ex [id number]       Therapeutic Activities 04284       ADL/Self Care 82053       Orthotic Management 99143       Manual 21152     Neuro Re-Ed 01156       Non-Billable Time          Evaluation Time additionally includes thorough review of current medical information, gathering information on past medical history/social history and prior level of function, interpretation of standardized testing/informal observation of tasks, assessment of data and development of plan of care and goals.             Luisito Paulino OTR/L; [id number]

## 2023-01-18 NOTE — CARE COORDINATION
1/18/23 1009 CM note: NO COVID TESTING THIS ADMIT. Room air. Discharge order noted. Discharge plan remains home. IV ATB are not ordered for home so patient declines need for HHC. Notified Philipp Mckeon, to cancel referral. Pts  will provide transportation home.  Electronically signed by An Villar RN on 1/18/2023 at 10:12 AM

## 2023-01-22 LAB
BLOOD CULTURE, ROUTINE: NORMAL
CULTURE, BLOOD 2: NORMAL

## 2023-01-27 ENCOUNTER — TELEPHONE (OUTPATIENT)
Dept: PRIMARY CARE CLINIC | Age: 48
End: 2023-01-27

## 2023-01-27 DIAGNOSIS — R05.1 ACUTE COUGH: Primary | ICD-10-CM

## 2023-01-27 RX ORDER — BROMPHENIRAMINE MALEATE, PSEUDOEPHEDRINE HYDROCHLORIDE, AND DEXTROMETHORPHAN HYDROBROMIDE 2; 30; 10 MG/5ML; MG/5ML; MG/5ML
5 SYRUP ORAL 4 TIMES DAILY PRN
Qty: 473 ML | Refills: 0 | Status: SHIPPED | OUTPATIENT
Start: 2023-01-27 | End: 2023-02-06

## 2023-01-27 NOTE — TELEPHONE ENCOUNTER
----- Message from Sandee Lutz sent at 1/27/2023 10:15 AM EST -----  Subject: Message to Provider    QUESTIONS  Information for Provider? pt wanted to know if can get prescription for   coughing related, send to [facility].   ---------------------------------------------------------------------------  --------------  [address] [employer]  [phone]; OK to leave message on voicemail  ---------------------------------------------------------------------------  --------------  SCRIPT ANSWERS  Relationship to Patient?  Self

## 2023-02-06 DIAGNOSIS — E11.65 TYPE 2 DIABETES MELLITUS WITH HYPERGLYCEMIA, WITH LONG-TERM CURRENT USE OF INSULIN (HCC): ICD-10-CM

## 2023-02-06 DIAGNOSIS — Z79.4 TYPE 2 DIABETES MELLITUS WITH HYPERGLYCEMIA, WITH LONG-TERM CURRENT USE OF INSULIN (HCC): ICD-10-CM

## 2023-02-09 ENCOUNTER — TELEPHONE (OUTPATIENT)
Dept: PRIMARY CARE CLINIC | Age: 48
End: 2023-02-09

## 2023-02-09 RX ORDER — FLUCONAZOLE 100 MG/1
100 TABLET ORAL DAILY
Qty: 3 TABLET | Refills: 0 | Status: SHIPPED | OUTPATIENT
Start: 2023-02-09 | End: 2023-02-12

## 2023-02-20 ENCOUNTER — TELEPHONE (OUTPATIENT)
Dept: PRIMARY CARE CLINIC | Age: 48
End: 2023-02-20

## 2023-02-20 DIAGNOSIS — M25.559 HIP PAIN: Primary | ICD-10-CM

## 2023-02-20 RX ORDER — METHOCARBAMOL 750 MG/1
750 TABLET, FILM COATED ORAL 3 TIMES DAILY
Qty: 90 TABLET | Refills: 0 | Status: SHIPPED | OUTPATIENT
Start: 2023-02-20 | End: 2023-03-22

## 2023-03-20 RX ORDER — CLOTRIMAZOLE 1 %
CREAM (GRAM) TOPICAL
Qty: 28 G | Refills: 1 | Status: SHIPPED | OUTPATIENT
Start: 2023-03-20 | End: 2023-03-27

## 2023-03-26 ENCOUNTER — HOSPITAL ENCOUNTER (EMERGENCY)
Age: 48
Discharge: HOME | End: 2023-03-26
Attending: EMERGENCY MEDICINE
Payer: COMMERCIAL

## 2023-03-26 VITALS
DIASTOLIC BLOOD PRESSURE: 83 MMHG | HEART RATE: 102 BPM | BODY MASS INDEX: 25.84 KG/M2 | RESPIRATION RATE: 18 BRPM | SYSTOLIC BLOOD PRESSURE: 156 MMHG | TEMPERATURE: 97.1 F | WEIGHT: 165 LBS | OXYGEN SATURATION: 96 %

## 2023-03-26 DIAGNOSIS — N39.0 URINARY TRACT INFECTION WITHOUT HEMATURIA, SITE UNSPECIFIED: ICD-10-CM

## 2023-03-26 DIAGNOSIS — E86.0 DEHYDRATION: Primary | ICD-10-CM

## 2023-03-26 DIAGNOSIS — R11.2 NAUSEA AND VOMITING, UNSPECIFIED VOMITING TYPE: ICD-10-CM

## 2023-03-26 LAB
ALBUMIN SERPL-MCNC: 4.4 G/DL (ref 3.5–5.2)
ALP SERPL-CCNC: 149 U/L (ref 35–104)
ALT SERPL-CCNC: 10 U/L (ref 0–32)
ANION GAP SERPL CALCULATED.3IONS-SCNC: 13 MMOL/L (ref 7–16)
AST SERPL-CCNC: 12 U/L (ref 0–31)
BACTERIA URNS QL MICRO: ABNORMAL /HPF
BASOPHILS # BLD: 0.05 E9/L (ref 0–0.2)
BASOPHILS NFR BLD: 0.5 % (ref 0–2)
BILIRUB SERPL-MCNC: 0.4 MG/DL (ref 0–1.2)
BILIRUB UR QL STRIP: NEGATIVE
BUN SERPL-MCNC: 14 MG/DL (ref 6–20)
CALCIUM SERPL-MCNC: 10.4 MG/DL (ref 8.6–10.2)
CHLORIDE SERPL-SCNC: 95 MMOL/L (ref 98–107)
CLARITY UR: ABNORMAL
CO2 SERPL-SCNC: 31 MMOL/L (ref 22–29)
COLOR UR: YELLOW
CREAT SERPL-MCNC: 0.7 MG/DL (ref 0.5–1)
EOSINOPHIL # BLD: 0.03 E9/L (ref 0.05–0.5)
EOSINOPHIL NFR BLD: 0.3 % (ref 0–6)
EPI CELLS #/AREA URNS HPF: ABNORMAL /HPF
ERYTHROCYTE [DISTWIDTH] IN BLOOD BY AUTOMATED COUNT: 17.9 FL (ref 11.5–15)
GLUCOSE SERPL-MCNC: 256 MG/DL (ref 74–99)
GLUCOSE UR STRIP-MCNC: NEGATIVE MG/DL
HCG, URINE, POC: NEGATIVE
HCT VFR BLD AUTO: 53.8 % (ref 34–48)
HGB BLD-MCNC: 17.1 G/DL (ref 11.5–15.5)
HGB UR QL STRIP: NEGATIVE
IMM GRANULOCYTES # BLD: 0.03 E9/L
IMM GRANULOCYTES NFR BLD: 0.3 % (ref 0–5)
INFLUENZA A BY PCR: NOT DETECTED
INFLUENZA B BY PCR: NOT DETECTED
KETONES UR STRIP-MCNC: 15 MG/DL
LEUKOCYTE ESTERASE UR QL STRIP: ABNORMAL
LIPASE: 26 U/L (ref 13–60)
LYMPHOCYTES # BLD: 2.3 E9/L (ref 1.5–4)
LYMPHOCYTES NFR BLD: 22.4 % (ref 20–42)
Lab: NORMAL
MCH RBC QN AUTO: 26.2 PG (ref 26–35)
MCHC RBC AUTO-ENTMCNC: 31.8 % (ref 32–34.5)
MCV RBC AUTO: 82.5 FL (ref 80–99.9)
MONOCYTES # BLD: 0.5 E9/L (ref 0.1–0.95)
MONOCYTES NFR BLD: 4.9 % (ref 2–12)
NEGATIVE QC PASS/FAIL: NORMAL
NEUTROPHILS # BLD: 7.37 E9/L (ref 1.8–7.3)
NEUTS SEG NFR BLD: 71.6 % (ref 43–80)
NITRITE UR QL STRIP: POSITIVE
PH UR STRIP: 7.5 [PH] (ref 5–9)
PLATELET # BLD AUTO: 381 E9/L (ref 130–450)
PMV BLD AUTO: 11.1 FL (ref 7–12)
POSITIVE QC PASS/FAIL: NORMAL
POTASSIUM SERPL-SCNC: 3.9 MMOL/L (ref 3.5–5)
PROT SERPL-MCNC: 8 G/DL (ref 6.4–8.3)
PROT UR STRIP-MCNC: ABNORMAL MG/DL
RBC # BLD AUTO: 6.52 E12/L (ref 3.5–5.5)
RBC #/AREA URNS HPF: ABNORMAL /HPF (ref 0–2)
SARS-COV-2 RDRP RESP QL NAA+PROBE: NOT DETECTED
SODIUM SERPL-SCNC: 139 MMOL/L (ref 132–146)
SP GR UR STRIP: 1.01 (ref 1–1.03)
UROBILINOGEN UR STRIP-ACNC: 0.2 E.U./DL
WBC # BLD: 10.3 E9/L (ref 4.5–11.5)
WBC #/AREA URNS HPF: ABNORMAL /HPF (ref 0–5)

## 2023-03-26 PROCEDURE — 96361 HYDRATE IV INFUSION ADD-ON: CPT

## 2023-03-26 PROCEDURE — 96374 THER/PROPH/DIAG INJ IV PUSH: CPT

## 2023-03-26 PROCEDURE — 87635 SARS-COV-2 COVID-19 AMP PRB: CPT

## 2023-03-26 PROCEDURE — 81001 URINALYSIS AUTO W/SCOPE: CPT

## 2023-03-26 PROCEDURE — 2580000003 HC RX 258: Performed by: EMERGENCY MEDICINE

## 2023-03-26 PROCEDURE — 80053 COMPREHEN METABOLIC PANEL: CPT

## 2023-03-26 PROCEDURE — 83690 ASSAY OF LIPASE: CPT

## 2023-03-26 PROCEDURE — 6360000002 HC RX W HCPCS: Performed by: EMERGENCY MEDICINE

## 2023-03-26 PROCEDURE — 6370000000 HC RX 637 (ALT 250 FOR IP): Performed by: EMERGENCY MEDICINE

## 2023-03-26 PROCEDURE — 87502 INFLUENZA DNA AMP PROBE: CPT

## 2023-03-26 PROCEDURE — 36415 COLL VENOUS BLD VENIPUNCTURE: CPT

## 2023-03-26 PROCEDURE — 85025 COMPLETE CBC W/AUTO DIFF WBC: CPT

## 2023-03-26 PROCEDURE — 99284 EMERGENCY DEPT VISIT MOD MDM: CPT

## 2023-03-26 RX ORDER — CEFDINIR 300 MG/1
300 CAPSULE ORAL 2 TIMES DAILY
Qty: 20 CAPSULE | Refills: 0 | Status: SHIPPED | OUTPATIENT
Start: 2023-03-26 | End: 2023-04-05

## 2023-03-26 RX ORDER — ONDANSETRON 2 MG/ML
4 INJECTION INTRAMUSCULAR; INTRAVENOUS ONCE
Status: COMPLETED | OUTPATIENT
Start: 2023-03-26 | End: 2023-03-26

## 2023-03-26 RX ORDER — 0.9 % SODIUM CHLORIDE 0.9 %
1000 INTRAVENOUS SOLUTION INTRAVENOUS ONCE
Status: COMPLETED | OUTPATIENT
Start: 2023-03-26 | End: 2023-03-26

## 2023-03-26 RX ORDER — KETOROLAC TROMETHAMINE 15 MG/ML
INJECTION, SOLUTION INTRAMUSCULAR; INTRAVENOUS
Status: DISCONTINUED
Start: 2023-03-26 | End: 2023-03-26 | Stop reason: WASHOUT

## 2023-03-26 RX ORDER — CEFDINIR 300 MG/1
300 CAPSULE ORAL EVERY 12 HOURS SCHEDULED
Status: DISCONTINUED | OUTPATIENT
Start: 2023-03-26 | End: 2023-03-26

## 2023-03-26 RX ORDER — CEFDINIR 300 MG/1
300 CAPSULE ORAL ONCE
Status: COMPLETED | OUTPATIENT
Start: 2023-03-26 | End: 2023-03-26

## 2023-03-26 RX ORDER — ONDANSETRON 4 MG/1
4 TABLET, FILM COATED ORAL EVERY 4 HOURS
Qty: 20 TABLET | Refills: 0 | Status: SHIPPED | OUTPATIENT
Start: 2023-03-26

## 2023-03-26 RX ORDER — HYDROCODONE BITARTRATE AND ACETAMINOPHEN 5; 325 MG/1; MG/1
TABLET ORAL
COMMUNITY
Start: 2023-03-14

## 2023-03-26 RX ADMIN — ONDANSETRON 4 MG: 2 INJECTION INTRAMUSCULAR; INTRAVENOUS at 11:51

## 2023-03-26 RX ADMIN — CEFDINIR 300 MG: 300 CAPSULE ORAL at 14:18

## 2023-03-26 RX ADMIN — SODIUM CHLORIDE 1000 ML: 9 INJECTION, SOLUTION INTRAVENOUS at 11:50

## 2023-03-26 ASSESSMENT — LIFESTYLE VARIABLES: HOW OFTEN DO YOU HAVE A DRINK CONTAINING ALCOHOL: NEVER

## 2023-03-26 NOTE — ED PROVIDER NOTES
mouth once daily    B-D INS SYRINGE 0.5CC/31GX5/16 31G X 5/16\" 0.5 ML MISC        BLOOD GLUCOSE MONITOR STRIPS    Test 2 times a day & as needed for symptoms of irregular blood glucose. Dispense sufficient amount for indicated testing frequency plus additional to accommodate PRN testing needs. Please dispense what is covered by insurance    CLOTRIMAZOLE (LOTRIMIN AF) 1 % CREAM    Apply topically 2 times daily. DOXAZOSIN (CARDURA) 1 MG TABLET        DULAGLUTIDE (TRULICITY) 1.5 MV/2.5BF SC INJECTION    Inject 0.5 mLs into the skin once a week    HYDROCODONE-ACETAMINOPHEN (NORCO) 5-325 MG PER TABLET        INSULIN PEN NEEDLE 32G X 6 MM MISC    1 Device by Does not apply route daily    LANCETS MISC    1 each by Does not apply route 2 times daily    LANTUS SOLOSTAR 100 UNIT/ML INJECTION PEN    inject 36 units subcutaneously twice a day    METFORMIN (GLUCOPHAGE) 1000 MG TABLET    Take 1 tablet by mouth 2 times daily (with meals)       ALLERGIES     Patient has no known allergies.     FAMILYHISTORY       Family History   Problem Relation Age of Onset    Asthma Daughter     Brain Cancer Daughter     Diabetes Mother     Alcohol Abuse Father     Heart Attack Father     Hypotension Father     Cancer Father         lung    Lung Cancer Father     Cancer Maternal Grandmother     Diabetes Maternal Grandmother     Cancer Maternal Grandfather     Kidney Cancer Maternal Grandfather     Cancer Paternal Grandmother     Cancer Paternal Grandfather     Asthma Son         SOCIAL HISTORY       Social History     Tobacco Use    Smoking status: Every Day     Packs/day: 0.50     Years: 1.00     Pack years: 0.50     Types: Cigarettes    Smokeless tobacco: Never   Vaping Use    Vaping Use: Never used   Substance Use Topics    Alcohol use: No    Drug use: Not Currently     Types: Marijuana [place])     Comment: states not every day use       SCREENINGS        [place] Coma Scale  Eye Opening: Spontaneous  Best Verbal Response: Oriented  Best

## 2023-04-02 ENCOUNTER — APPOINTMENT (OUTPATIENT)
Dept: GENERAL RADIOLOGY | Age: 48
End: 2023-04-02
Payer: COMMERCIAL

## 2023-04-02 ENCOUNTER — HOSPITAL ENCOUNTER (EMERGENCY)
Age: 48
Discharge: HOME | End: 2023-04-02
Attending: FAMILY MEDICINE
Payer: COMMERCIAL

## 2023-04-02 VITALS
BODY MASS INDEX: 28.25 KG/M2 | OXYGEN SATURATION: 99 % | TEMPERATURE: 97.1 F | DIASTOLIC BLOOD PRESSURE: 93 MMHG | SYSTOLIC BLOOD PRESSURE: 143 MMHG | HEIGHT: 67 IN | HEART RATE: 120 BPM | WEIGHT: 180 LBS | RESPIRATION RATE: 16 BRPM

## 2023-04-02 DIAGNOSIS — S93.402A SPRAIN OF LEFT ANKLE, UNSPECIFIED LIGAMENT, INITIAL ENCOUNTER: ICD-10-CM

## 2023-04-02 DIAGNOSIS — S70.02XA CONTUSION OF LEFT HIP, INITIAL ENCOUNTER: Primary | ICD-10-CM

## 2023-04-02 DIAGNOSIS — S86.912A STRAIN OF LEFT KNEE, INITIAL ENCOUNTER: ICD-10-CM

## 2023-04-02 PROCEDURE — 73502 X-RAY EXAM HIP UNI 2-3 VIEWS: CPT

## 2023-04-02 PROCEDURE — 99283 EMERGENCY DEPT VISIT LOW MDM: CPT

## 2023-04-02 PROCEDURE — 73610 X-RAY EXAM OF ANKLE: CPT

## 2023-04-02 PROCEDURE — 73560 X-RAY EXAM OF KNEE 1 OR 2: CPT

## 2023-04-02 PROCEDURE — 6370000000 HC RX 637 (ALT 250 FOR IP): Performed by: FAMILY MEDICINE

## 2023-04-02 RX ORDER — IBUPROFEN 800 MG/1
800 TABLET ORAL ONCE
Status: COMPLETED | OUTPATIENT
Start: 2023-04-02 | End: 2023-04-02

## 2023-04-02 RX ORDER — NAPROXEN 500 MG/1
500 TABLET ORAL 2 TIMES DAILY
Qty: 20 TABLET | Refills: 0 | Status: SHIPPED | OUTPATIENT
Start: 2023-04-02 | End: 2023-04-12

## 2023-04-02 RX ADMIN — IBUPROFEN 800 MG: 800 TABLET, FILM COATED ORAL at 18:59

## 2023-04-02 ASSESSMENT — PAIN DESCRIPTION - DESCRIPTORS: DESCRIPTORS: THROBBING

## 2023-04-02 ASSESSMENT — PAIN - FUNCTIONAL ASSESSMENT: PAIN_FUNCTIONAL_ASSESSMENT: 0-10

## 2023-04-02 ASSESSMENT — PAIN SCALES - GENERAL: PAINLEVEL_OUTOF10: 10

## 2023-04-02 ASSESSMENT — PAIN DESCRIPTION - LOCATION: LOCATION: HIP;ANKLE;KNEE

## 2023-04-02 ASSESSMENT — PAIN DESCRIPTION - PAIN TYPE: TYPE: ACUTE PAIN

## 2023-04-02 ASSESSMENT — PAIN DESCRIPTION - ORIENTATION: ORIENTATION: LEFT

## 2023-04-02 ASSESSMENT — PAIN DESCRIPTION - FREQUENCY: FREQUENCY: CONTINUOUS

## 2023-04-02 NOTE — ED PROVIDER NOTES
HPI:  4/2/23,   Time: 6:55 PM EDT         Agueda Juárez is a 41 y.o. female presenting to the ED for cute onset of injury today, she was backing up with a load of laundry, tripped over her 's knee scooter, landing on her left side causing injury to the left hip, left knee and left ankle. She complains of pain with weightbearing and any attempted ambulation. The knee pain is worse with bending the knee. She is status post left hip fracture with pinning and ROM insertion of ga about 2 months ago. ROS:   Pertinent positives and negatives are stated within HPI, all other systems reviewed and are negative.  --------------------------------------------- PAST HISTORY ---------------------------------------------  Past Medical History:  has a past medical history of BRCA1 gene mutation positive, Breast cancer [address] Cancer ([facility] Diabetes mellitus ([facility] Headache(784.0), Hyperlipidemia, Hypertension, Kidney stone, Lumbar herniated disc, Migraine, and Pancreatitis. Past Surgical History:  has a past surgical history that includes Cholecystectomy; Tubal ligation; LEEP; Tonsillectomy; Tibia fracture surgery; Ankle surgery; Breast biopsy; Breast lumpectomy; Hysterectomy; and Hip fracture surgery (Left, 12/16/2022). Social History:  reports that she has been smoking cigarettes. She has a 0.50 pack-year smoking history. She has never used smokeless tobacco. She reports that she does not currently use drugs after having used the following drugs: Marijuana Jurline [place]ley). She reports that she does not drink alcohol.     Family History: family history includes Alcohol Abuse in her father; Asthma in her daughter and son; Brain Cancer in her daughter; Cancer in her father, maternal grandfather, maternal grandmother, paternal grandfather, and paternal grandmother; Diabetes in her maternal grandmother and mother; Heart Attack in her father; Hypotension in her father; Kidney Cancer in her maternal grandfather; Lung

## 2023-04-03 DIAGNOSIS — K21.9 GASTROESOPHAGEAL REFLUX DISEASE WITHOUT ESOPHAGITIS: ICD-10-CM

## 2023-04-03 RX ORDER — PANTOPRAZOLE SODIUM 40 MG/1
TABLET, DELAYED RELEASE ORAL
Qty: 90 TABLET | Refills: 0 | OUTPATIENT
Start: 2023-04-03

## 2023-04-25 ENCOUNTER — TELEPHONE (OUTPATIENT)
Dept: PRIMARY CARE CLINIC | Age: 48
End: 2023-04-25

## 2023-04-28 DIAGNOSIS — Z79.4 TYPE 2 DIABETES MELLITUS WITH HYPERGLYCEMIA, WITH LONG-TERM CURRENT USE OF INSULIN (HCC): ICD-10-CM

## 2023-04-28 DIAGNOSIS — E11.65 TYPE 2 DIABETES MELLITUS WITH HYPERGLYCEMIA, WITH LONG-TERM CURRENT USE OF INSULIN (HCC): ICD-10-CM

## 2023-05-16 ENCOUNTER — TELEPHONE (OUTPATIENT)
Dept: PRIMARY CARE CLINIC | Age: 48
End: 2023-05-16

## 2023-05-16 NOTE — TELEPHONE ENCOUNTER
Patient would like an antibiotic sent to [facility] for sinus infections    States \" have tried everything\"

## 2023-05-17 ENCOUNTER — OFFICE VISIT (OUTPATIENT)
Dept: PRIMARY CARE CLINIC | Age: 48
End: 2023-05-17
Payer: COMMERCIAL

## 2023-05-17 VITALS
HEIGHT: 67 IN | SYSTOLIC BLOOD PRESSURE: 130 MMHG | WEIGHT: 180 LBS | TEMPERATURE: 97.2 F | BODY MASS INDEX: 28.25 KG/M2 | RESPIRATION RATE: 18 BRPM | OXYGEN SATURATION: 98 % | DIASTOLIC BLOOD PRESSURE: 78 MMHG | HEART RATE: 102 BPM

## 2023-05-17 DIAGNOSIS — J32.0 CHRONIC MAXILLARY SINUSITIS: Primary | ICD-10-CM

## 2023-05-17 PROCEDURE — G8427 DOCREV CUR MEDS BY ELIG CLIN: HCPCS | Performed by: STUDENT IN AN ORGANIZED HEALTH CARE EDUCATION/TRAINING PROGRAM

## 2023-05-17 PROCEDURE — 3075F SYST BP GE 130 - 139MM HG: CPT | Performed by: STUDENT IN AN ORGANIZED HEALTH CARE EDUCATION/TRAINING PROGRAM

## 2023-05-17 PROCEDURE — G8417 CALC BMI ABV UP PARAM F/U: HCPCS | Performed by: STUDENT IN AN ORGANIZED HEALTH CARE EDUCATION/TRAINING PROGRAM

## 2023-05-17 PROCEDURE — 4004F PT TOBACCO SCREEN RCVD TLK: CPT | Performed by: STUDENT IN AN ORGANIZED HEALTH CARE EDUCATION/TRAINING PROGRAM

## 2023-05-17 PROCEDURE — 3078F DIAST BP <80 MM HG: CPT | Performed by: STUDENT IN AN ORGANIZED HEALTH CARE EDUCATION/TRAINING PROGRAM

## 2023-05-17 PROCEDURE — 99213 OFFICE O/P EST LOW 20 MIN: CPT | Performed by: STUDENT IN AN ORGANIZED HEALTH CARE EDUCATION/TRAINING PROGRAM

## 2023-05-17 RX ORDER — AMOXICILLIN AND CLAVULANATE POTASSIUM 875; 125 MG/1; MG/1
1 TABLET, FILM COATED ORAL 2 TIMES DAILY
Qty: 14 TABLET | Refills: 0 | Status: SHIPPED | OUTPATIENT
Start: 2023-05-17 | End: 2023-05-24

## 2023-05-17 RX ORDER — FLUCONAZOLE 150 MG/1
150 TABLET ORAL ONCE
Qty: 1 TABLET | Refills: 0 | Status: SHIPPED | OUTPATIENT
Start: 2023-05-17 | End: 2023-05-17

## 2023-05-17 ASSESSMENT — ENCOUNTER SYMPTOMS
GASTROINTESTINAL NEGATIVE: 1
EYES NEGATIVE: 1
COUGH: 1
SINUS PRESSURE: 1
SINUS PAIN: 1

## 2023-05-17 NOTE — PROGRESS NOTES
Kylah Bailon (:  1975) is a 41 y.o. female,Established patient, here for evaluation of the following chief complaint(s):  Dizziness (Ears hurt, congestion and runny nose, weak and fatgue since yesterday. Ears been hurting for couple days)         ASSESSMENT/PLAN:  1. Chronic maxillary sinusitis  -     amoxicillin-clavulanate (AUGMENTIN) 875-125 MG per tablet; Take 1 tablet by mouth 2 times daily for 7 days, Disp-14 tablet, R-0Normal      No follow-ups on file. Subjective   SUBJECTIVE/OBJECTIVE:  HPI    Patient is a 42 y/o F who presents for acute visit for sinus congestion, ear pain, cough for the past 2 days. Her  has been sick with similar symptoms; she has had sinus pressure, headaches and at times, bloody nasal discharge; occasional cough but denies fever, SOA, sore throat, diarrhea     She has tried multiple medications without relief    Blood sugars have been running fine    Review of Systems   Constitutional:  Positive for fatigue. HENT:  Positive for congestion, ear pain, sinus pressure and sinus pain. Eyes: Negative. Respiratory:  Positive for cough. Cardiovascular: Negative. Gastrointestinal: Negative. Objective   Physical Exam  Vitals reviewed. Constitutional:       General: She is not in acute distress. HENT:      Head: Normocephalic and atraumatic. Comments: TTP maxillary sinuses     Right Ear: Tympanic membrane, ear canal and external ear normal. There is no impacted cerumen. Left Ear: Tympanic membrane, ear canal and external ear normal. There is no impacted cerumen. Nose: Congestion present. Mouth/Throat:      Mouth: Mucous membranes are moist.      Pharynx: Posterior oropharyngeal erythema present. No oropharyngeal exudate. Eyes:      General:         Right eye: No discharge. Left eye: No discharge. Cardiovascular:      Rate and Rhythm: Normal rate and regular rhythm. Pulses: Normal pulses.       Heart

## 2023-05-31 ENCOUNTER — TELEPHONE (OUTPATIENT)
Dept: PRIMARY CARE CLINIC | Age: 48
End: 2023-05-31

## 2023-05-31 NOTE — TELEPHONE ENCOUNTER
Patient states she and her  are still sick completed previous dose of antibiotics would like another script sent in [facility]  [place]

## 2023-06-01 ENCOUNTER — TELEPHONE (OUTPATIENT)
Dept: PRIMARY CARE CLINIC | Age: 48
End: 2023-06-01

## 2023-06-01 ENCOUNTER — OFFICE VISIT (OUTPATIENT)
Dept: PRIMARY CARE CLINIC | Age: 48
End: 2023-06-01
Payer: COMMERCIAL

## 2023-06-01 VITALS
BODY MASS INDEX: 26.38 KG/M2 | WEIGHT: 168.1 LBS | DIASTOLIC BLOOD PRESSURE: 80 MMHG | TEMPERATURE: 97.1 F | HEART RATE: 90 BPM | HEIGHT: 67 IN | SYSTOLIC BLOOD PRESSURE: 132 MMHG | OXYGEN SATURATION: 94 %

## 2023-06-01 DIAGNOSIS — E11.9 TYPE 2 DIABETES MELLITUS WITHOUT COMPLICATION, WITHOUT LONG-TERM CURRENT USE OF INSULIN (HCC): ICD-10-CM

## 2023-06-01 DIAGNOSIS — J30.2 SEASONAL ALLERGIC RHINITIS, UNSPECIFIED TRIGGER: ICD-10-CM

## 2023-06-01 DIAGNOSIS — R63.4 UNINTENTIONAL WEIGHT LOSS: ICD-10-CM

## 2023-06-01 DIAGNOSIS — R63.4 UNINTENTIONAL WEIGHT LOSS: Primary | ICD-10-CM

## 2023-06-01 DIAGNOSIS — F41.9 ANXIETY: ICD-10-CM

## 2023-06-01 LAB
ALBUMIN SERPL-MCNC: 4.1 G/DL (ref 3.5–5.2)
ALP SERPL-CCNC: 155 U/L (ref 35–104)
ALT SERPL-CCNC: 9 U/L (ref 0–32)
ANION GAP SERPL CALCULATED.3IONS-SCNC: 9 MMOL/L (ref 7–16)
AST SERPL-CCNC: 10 U/L (ref 0–31)
BASOPHILS # BLD: 0.1 E9/L (ref 0–0.2)
BASOPHILS NFR BLD: 0.9 % (ref 0–2)
BILIRUB SERPL-MCNC: <0.2 MG/DL (ref 0–1.2)
BUN SERPL-MCNC: 9 MG/DL (ref 6–20)
CALCIUM SERPL-MCNC: 9.9 MG/DL (ref 8.6–10.2)
CHLORIDE SERPL-SCNC: 98 MMOL/L (ref 98–107)
CO2 SERPL-SCNC: 27 MMOL/L (ref 22–29)
CREAT SERPL-MCNC: 0.7 MG/DL (ref 0.5–1)
EOSINOPHIL # BLD: 0.21 E9/L (ref 0.05–0.5)
EOSINOPHIL NFR BLD: 1.9 % (ref 0–6)
ERYTHROCYTE [DISTWIDTH] IN BLOOD BY AUTOMATED COUNT: 13.8 FL (ref 11.5–15)
GLUCOSE SERPL-MCNC: 332 MG/DL (ref 74–99)
HBA1C MFR BLD: 9.9 % (ref 4–5.6)
HCT VFR BLD AUTO: 50.2 % (ref 34–48)
HGB BLD-MCNC: 16.1 G/DL (ref 11.5–15.5)
IMM GRANULOCYTES # BLD: 0.06 E9/L
IMM GRANULOCYTES NFR BLD: 0.5 % (ref 0–5)
LYMPHOCYTES # BLD: 3.1 E9/L (ref 1.5–4)
LYMPHOCYTES NFR BLD: 28.1 % (ref 20–42)
MCH RBC QN AUTO: 29.4 PG (ref 26–35)
MCHC RBC AUTO-ENTMCNC: 32.1 % (ref 32–34.5)
MCV RBC AUTO: 91.8 FL (ref 80–99.9)
MONOCYTES # BLD: 0.51 E9/L (ref 0.1–0.95)
MONOCYTES NFR BLD: 4.6 % (ref 2–12)
NEUTROPHILS # BLD: 7.05 E9/L (ref 1.8–7.3)
NEUTS SEG NFR BLD: 64 % (ref 43–80)
PLATELET # BLD AUTO: 367 E9/L (ref 130–450)
PMV BLD AUTO: 11.6 FL (ref 7–12)
POTASSIUM SERPL-SCNC: 5.2 MMOL/L (ref 3.5–5)
PROT SERPL-MCNC: 7.4 G/DL (ref 6.4–8.3)
RBC # BLD AUTO: 5.47 E12/L (ref 3.5–5.5)
SODIUM SERPL-SCNC: 134 MMOL/L (ref 132–146)
WBC # BLD: 11 E9/L (ref 4.5–11.5)

## 2023-06-01 PROCEDURE — G8417 CALC BMI ABV UP PARAM F/U: HCPCS | Performed by: STUDENT IN AN ORGANIZED HEALTH CARE EDUCATION/TRAINING PROGRAM

## 2023-06-01 PROCEDURE — 3079F DIAST BP 80-89 MM HG: CPT | Performed by: STUDENT IN AN ORGANIZED HEALTH CARE EDUCATION/TRAINING PROGRAM

## 2023-06-01 PROCEDURE — 2022F DILAT RTA XM EVC RTNOPTHY: CPT | Performed by: STUDENT IN AN ORGANIZED HEALTH CARE EDUCATION/TRAINING PROGRAM

## 2023-06-01 PROCEDURE — 3046F HEMOGLOBIN A1C LEVEL >9.0%: CPT | Performed by: STUDENT IN AN ORGANIZED HEALTH CARE EDUCATION/TRAINING PROGRAM

## 2023-06-01 PROCEDURE — 99214 OFFICE O/P EST MOD 30 MIN: CPT | Performed by: STUDENT IN AN ORGANIZED HEALTH CARE EDUCATION/TRAINING PROGRAM

## 2023-06-01 PROCEDURE — G8427 DOCREV CUR MEDS BY ELIG CLIN: HCPCS | Performed by: STUDENT IN AN ORGANIZED HEALTH CARE EDUCATION/TRAINING PROGRAM

## 2023-06-01 PROCEDURE — 3075F SYST BP GE 130 - 139MM HG: CPT | Performed by: STUDENT IN AN ORGANIZED HEALTH CARE EDUCATION/TRAINING PROGRAM

## 2023-06-01 PROCEDURE — 4004F PT TOBACCO SCREEN RCVD TLK: CPT | Performed by: STUDENT IN AN ORGANIZED HEALTH CARE EDUCATION/TRAINING PROGRAM

## 2023-06-01 RX ORDER — LORATADINE 10 MG/1
10 TABLET ORAL DAILY
Qty: 90 TABLET | Refills: 1 | Status: SHIPPED | OUTPATIENT
Start: 2023-06-01

## 2023-06-01 RX ORDER — ESCITALOPRAM OXALATE 5 MG/1
5 TABLET ORAL DAILY
Qty: 30 TABLET | Refills: 0 | Status: SHIPPED | OUTPATIENT
Start: 2023-06-01

## 2023-06-01 RX ORDER — FLUCONAZOLE 150 MG/1
150 TABLET ORAL ONCE
Qty: 1 TABLET | Refills: 0 | Status: SHIPPED | OUTPATIENT
Start: 2023-06-01 | End: 2023-06-01

## 2023-06-01 RX ORDER — DOXYCYCLINE HYCLATE 100 MG
100 TABLET ORAL 2 TIMES DAILY
Qty: 14 TABLET | Refills: 0 | Status: SHIPPED | OUTPATIENT
Start: 2023-06-01 | End: 2023-06-08

## 2023-06-01 RX ORDER — FLUCONAZOLE 150 MG/1
150 TABLET ORAL ONCE
COMMUNITY
Start: 2023-05-17

## 2023-06-01 RX ORDER — METHYLPREDNISOLONE 4 MG/1
TABLET ORAL
Qty: 1 KIT | Refills: 0 | Status: SHIPPED | OUTPATIENT
Start: 2023-06-01 | End: 2023-06-07

## 2023-06-01 RX ORDER — FLUTICASONE PROPIONATE 50 MCG
1 SPRAY, SUSPENSION (ML) NASAL DAILY
Qty: 32 G | Refills: 1 | Status: SHIPPED | OUTPATIENT
Start: 2023-06-01

## 2023-06-01 SDOH — ECONOMIC STABILITY: FOOD INSECURITY: WITHIN THE PAST 12 MONTHS, THE FOOD YOU BOUGHT JUST DIDN'T LAST AND YOU DIDN'T HAVE MONEY TO GET MORE.: NEVER TRUE

## 2023-06-01 SDOH — ECONOMIC STABILITY: FOOD INSECURITY: WITHIN THE PAST 12 MONTHS, YOU WORRIED THAT YOUR FOOD WOULD RUN OUT BEFORE YOU GOT MONEY TO BUY MORE.: NEVER TRUE

## 2023-06-01 SDOH — ECONOMIC STABILITY: INCOME INSECURITY: HOW HARD IS IT FOR YOU TO PAY FOR THE VERY BASICS LIKE FOOD, HOUSING, MEDICAL CARE, AND HEATING?: NOT HARD AT ALL

## 2023-06-01 SDOH — ECONOMIC STABILITY: HOUSING INSECURITY
IN THE LAST 12 MONTHS, WAS THERE A TIME WHEN YOU DID NOT HAVE A STEADY PLACE TO SLEEP OR SLEPT IN A SHELTER (INCLUDING NOW)?: NO

## 2023-06-01 ASSESSMENT — PATIENT HEALTH QUESTIONNAIRE - PHQ9
SUM OF ALL RESPONSES TO PHQ QUESTIONS 1-9: 2
SUM OF ALL RESPONSES TO PHQ QUESTIONS 1-9: 2
1. LITTLE INTEREST OR PLEASURE IN DOING THINGS: 0
SUM OF ALL RESPONSES TO PHQ9 QUESTIONS 1 & 2: 2
SUM OF ALL RESPONSES TO PHQ QUESTIONS 1-9: 2
SUM OF ALL RESPONSES TO PHQ QUESTIONS 1-9: 2
2. FEELING DOWN, DEPRESSED OR HOPELESS: 2

## 2023-06-01 ASSESSMENT — ANXIETY QUESTIONNAIRES
1. FEELING NERVOUS, ANXIOUS, OR ON EDGE: 3
2. NOT BEING ABLE TO STOP OR CONTROL WORRYING: 3
6. BECOMING EASILY ANNOYED OR IRRITABLE: 3
3. WORRYING TOO MUCH ABOUT DIFFERENT THINGS: 3
IF YOU CHECKED OFF ANY PROBLEMS ON THIS QUESTIONNAIRE, HOW DIFFICULT HAVE THESE PROBLEMS MADE IT FOR YOU TO DO YOUR WORK, TAKE CARE OF THINGS AT HOME, OR GET ALONG WITH OTHER PEOPLE: SOMEWHAT DIFFICULT
GAD7 TOTAL SCORE: 17
5. BEING SO RESTLESS THAT IT IS HARD TO SIT STILL: 2
7. FEELING AFRAID AS IF SOMETHING AWFUL MIGHT HAPPEN: 0
4. TROUBLE RELAXING: 3

## 2023-06-01 ASSESSMENT — ENCOUNTER SYMPTOMS
RESPIRATORY NEGATIVE: 1
EYES NEGATIVE: 1
GASTROINTESTINAL NEGATIVE: 1
SINUS PRESSURE: 1
SINUS PAIN: 1

## 2023-06-01 NOTE — PROGRESS NOTES
but returned and she is complaining of ear pain, sinus pressure, copious nasal discharge and fatigue    Review of Systems   Constitutional:  Positive for fatigue and unexpected weight change. HENT:  Positive for congestion, ear pain, sinus pressure and sinus pain. Eyes: Negative. Respiratory: Negative. Cardiovascular: Negative. Gastrointestinal: Negative. Endocrine: Negative. Genitourinary: Negative. Skin: Negative. Psychiatric/Behavioral:  The patient is nervous/anxious. Objective   Physical Exam  Vitals reviewed. Constitutional:       General: She is not in acute distress. HENT:      Head: Normocephalic and atraumatic. Right Ear: Tympanic membrane, ear canal and external ear normal. There is no impacted cerumen. Left Ear: Tympanic membrane, ear canal and external ear normal. There is no impacted cerumen. Nose: Congestion and rhinorrhea present. Mouth/Throat:      Mouth: Mucous membranes are moist.      Pharynx: Posterior oropharyngeal erythema present. No oropharyngeal exudate. Eyes:      General:         Right eye: No discharge. Left eye: No discharge. Cardiovascular:      Rate and Rhythm: Normal rate and regular rhythm. Pulses: Normal pulses. Heart sounds: Normal heart sounds. Pulmonary:      Effort: Pulmonary effort is normal.      Breath sounds: Normal breath sounds. Musculoskeletal:      Cervical back: Tenderness present. Lymphadenopathy:      Cervical: No cervical adenopathy. Neurological:      Mental Status: She is alert. Psychiatric:         Mood and Affect: Mood normal.         Behavior: Behavior normal.              An electronic signature was used to authenticate this note.     --Mary Tejada MD

## 2023-06-23 DIAGNOSIS — F41.9 ANXIETY: ICD-10-CM

## 2023-06-26 RX ORDER — ESCITALOPRAM OXALATE 5 MG/1
TABLET ORAL
Qty: 90 TABLET | Refills: 3 | Status: SHIPPED | OUTPATIENT
Start: 2023-06-26

## 2023-06-27 DIAGNOSIS — E78.5 HYPERLIPIDEMIA, UNSPECIFIED HYPERLIPIDEMIA TYPE: ICD-10-CM

## 2023-06-27 RX ORDER — ATORVASTATIN CALCIUM 40 MG/1
TABLET, FILM COATED ORAL
Qty: 90 TABLET | Refills: 1 | OUTPATIENT
Start: 2023-06-27

## 2023-07-02 DIAGNOSIS — E11.9 TYPE 2 DIABETES MELLITUS WITHOUT COMPLICATION, WITH LONG-TERM CURRENT USE OF INSULIN (HCC): ICD-10-CM

## 2023-07-02 DIAGNOSIS — Z79.4 TYPE 2 DIABETES MELLITUS WITHOUT COMPLICATION, WITH LONG-TERM CURRENT USE OF INSULIN (HCC): ICD-10-CM

## 2023-08-02 ENCOUNTER — HOSPITAL ENCOUNTER (OUTPATIENT)
Dept: MRI IMAGING | Age: 48
Discharge: HOME | End: 2023-08-04
Attending: INTERNAL MEDICINE
Payer: COMMERCIAL

## 2023-08-02 DIAGNOSIS — C50.511 MALIGNANT NEOPLASM OF LOWER-OUTER QUADRANT OF RIGHT FEMALE BREAST, UNSPECIFIED ESTROGEN RECEPTOR STATUS (HCC): ICD-10-CM

## 2023-08-02 DIAGNOSIS — Z17.0 ESTROGEN RECEPTOR POSITIVE STATUS (ER+): ICD-10-CM

## 2023-08-02 PROCEDURE — 6360000004 HC RX CONTRAST MEDICATION: Performed by: RADIOLOGY

## 2023-08-02 PROCEDURE — A9585 GADOBUTROL INJECTION: HCPCS | Performed by: RADIOLOGY

## 2023-08-02 PROCEDURE — C8908 MRI W/O FOL W/CONT, BREAST,: HCPCS

## 2023-08-02 RX ADMIN — GADOBUTROL 8 ML: 604.72 INJECTION INTRAVENOUS at 08:05

## 2023-08-03 ENCOUNTER — TELEPHONE (OUTPATIENT)
Dept: PRIMARY CARE CLINIC | Age: 48
End: 2023-08-03

## 2023-08-03 DIAGNOSIS — M25.552 BILATERAL HIP PAIN: Primary | ICD-10-CM

## 2023-08-03 DIAGNOSIS — M25.551 BILATERAL HIP PAIN: Primary | ICD-10-CM

## 2023-08-03 RX ORDER — METHOCARBAMOL 750 MG/1
750 TABLET, FILM COATED ORAL 3 TIMES DAILY
Qty: 90 TABLET | Refills: 0 | Status: SHIPPED | OUTPATIENT
Start: 2023-08-03

## 2023-08-04 ENCOUNTER — HOSPITAL ENCOUNTER (OUTPATIENT)
Dept: MAMMOGRAPHY | Age: 48
End: 2023-08-04
Attending: INTERNAL MEDICINE
Payer: COMMERCIAL

## 2023-08-04 ENCOUNTER — HOSPITAL ENCOUNTER (OUTPATIENT)
Dept: ULTRASOUND IMAGING | Age: 48
Discharge: HOME | End: 2023-08-04
Attending: INTERNAL MEDICINE
Payer: COMMERCIAL

## 2023-08-04 DIAGNOSIS — C50.511 MALIGNANT NEOPLASM OF LOWER-OUTER QUADRANT OF RIGHT FEMALE BREAST, UNSPECIFIED ESTROGEN RECEPTOR STATUS (HCC): ICD-10-CM

## 2023-08-04 DIAGNOSIS — R92.8 ABNORMAL MAMMOGRAM: ICD-10-CM

## 2023-08-04 DIAGNOSIS — Z17.0 ESTROGEN RECEPTOR POSITIVE STATUS (ER+): ICD-10-CM

## 2023-08-04 PROCEDURE — G0279 TOMOSYNTHESIS, MAMMO: HCPCS

## 2023-08-04 PROCEDURE — 76642 ULTRASOUND BREAST LIMITED: CPT

## 2023-08-07 ENCOUNTER — TELEPHONE (OUTPATIENT)
Dept: MAMMOGRAPHY | Age: 48
End: 2023-08-07

## 2023-08-07 NOTE — TELEPHONE ENCOUNTER
Order request faxed to Dr. Michael Leach for bilateral breast ultrasound biopsy as recommended from mammogram performed at [address] [place] [address] on 8/4/23. Successful fax confirmation.  Candance Hark, BSN, RN -Breast Navigator

## 2023-08-09 ENCOUNTER — TELEPHONE (OUTPATIENT)
Dept: MAMMOGRAPHY | Age: 48
End: 2023-08-09

## 2023-08-09 NOTE — TELEPHONE ENCOUNTER
Call to patient in reference to her mammogram performed at [facility] on 8/4/23. Instructed patient that the radiologist has recommended bilateral breast ultrasound biopsy. Patient verbalized understanding and is agreeable to proceed at [facility]. Orders received per Dr. Rodriguez Jackson and scanned into media. Patient informed she will receive separate call to schedule biopsy. IR scheduling notified of biopsy and will call patient to arrange appointment.  NORBERT QureshiN, RN -Breast Navigator

## 2023-08-10 RX ORDER — FLUCONAZOLE 150 MG/1
150 TABLET ORAL ONCE
Qty: 1 TABLET | Refills: 0 | Status: SHIPPED | OUTPATIENT
Start: 2023-08-10 | End: 2023-08-10

## 2023-08-18 ENCOUNTER — HOSPITAL ENCOUNTER (OUTPATIENT)
Dept: ULTRASOUND IMAGING | Age: 48
Discharge: HOME | End: 2023-08-18
Payer: COMMERCIAL

## 2023-08-18 ENCOUNTER — HOSPITAL ENCOUNTER (OUTPATIENT)
Dept: MAMMOGRAPHY | Age: 48
End: 2023-08-18
Payer: COMMERCIAL

## 2023-08-18 DIAGNOSIS — N63.10 MASS OF RIGHT BREAST, UNSPECIFIED QUADRANT: ICD-10-CM

## 2023-08-18 DIAGNOSIS — R92.8 ABNORMAL MAMMOGRAM: ICD-10-CM

## 2023-08-18 DIAGNOSIS — N64.89 BREAST ASYMMETRY: ICD-10-CM

## 2023-08-18 PROCEDURE — 19083 BX BREAST 1ST LESION US IMAG: CPT

## 2023-08-18 PROCEDURE — 77065 DX MAMMO INCL CAD UNI: CPT

## 2023-08-18 PROCEDURE — 88305 TISSUE EXAM BY PATHOLOGIST: CPT

## 2023-08-23 DIAGNOSIS — G89.29 CHRONIC BILATERAL LOW BACK PAIN WITH BILATERAL SCIATICA: Primary | ICD-10-CM

## 2023-08-23 DIAGNOSIS — M54.41 CHRONIC BILATERAL LOW BACK PAIN WITH BILATERAL SCIATICA: Primary | ICD-10-CM

## 2023-08-23 DIAGNOSIS — M54.42 CHRONIC BILATERAL LOW BACK PAIN WITH BILATERAL SCIATICA: Primary | ICD-10-CM

## 2023-08-23 LAB — SURGICAL PATHOLOGY REPORT: NORMAL

## 2023-08-23 RX ORDER — LIDOCAINE 50 MG/G
1 PATCH TOPICAL DAILY
Qty: 30 PATCH | Refills: 0 | Status: SHIPPED | OUTPATIENT
Start: 2023-08-23 | End: 2023-09-22

## 2023-08-31 ENCOUNTER — CLINICAL DOCUMENTATION (OUTPATIENT)
Dept: MAMMOGRAPHY | Age: 48
End: 2023-08-31

## 2023-08-31 ENCOUNTER — PATIENT MESSAGE (OUTPATIENT)
Dept: PRIMARY CARE CLINIC | Age: 48
End: 2023-08-31

## 2023-08-31 NOTE — PROGRESS NOTES
Faxed bilateral breast ultrasound biopsy pathology report and radiologist addendum report to referring provider, Dr. Marcello Sheppard. Successful fax confirmation.  NORBERT CordobaN, RN - Breast Navigator

## 2023-08-31 NOTE — TELEPHONE ENCOUNTER
From: Bernardo Cartagena  To: Dr. Norris Cloud  Sent: 8/31/2023 1:01 PM EDT  Subject: Medical question     Hi Dr Queta Vaca    I believe Lucrecia Cutter and I may have tape worm. Can something be called in for us?     Thank you,  Dennice Felty

## 2023-09-11 ENCOUNTER — TELEPHONE (OUTPATIENT)
Dept: PRIMARY CARE CLINIC | Age: 48
End: 2023-09-11

## 2023-09-11 DIAGNOSIS — E11.65 TYPE 2 DIABETES MELLITUS WITH HYPERGLYCEMIA, WITH LONG-TERM CURRENT USE OF INSULIN (HCC): ICD-10-CM

## 2023-09-11 DIAGNOSIS — E11.9 TYPE 2 DIABETES MELLITUS WITHOUT COMPLICATION, WITHOUT LONG-TERM CURRENT USE OF INSULIN (HCC): ICD-10-CM

## 2023-09-11 DIAGNOSIS — Z79.4 TYPE 2 DIABETES MELLITUS WITH HYPERGLYCEMIA, WITH LONG-TERM CURRENT USE OF INSULIN (HCC): ICD-10-CM

## 2023-09-11 DIAGNOSIS — R09.89 CHEST CONGESTION: Primary | ICD-10-CM

## 2023-09-11 RX ORDER — BROMPHENIRAMINE MALEATE, PSEUDOEPHEDRINE HYDROCHLORIDE, AND DEXTROMETHORPHAN HYDROBROMIDE 2; 30; 10 MG/5ML; MG/5ML; MG/5ML
5 SYRUP ORAL 4 TIMES DAILY PRN
Qty: 473 ML | Refills: 0 | Status: SHIPPED | OUTPATIENT
Start: 2023-09-11

## 2023-09-12 RX ORDER — INSULIN GLARGINE 100 [IU]/ML
36 INJECTION, SOLUTION SUBCUTANEOUS 2 TIMES DAILY
Qty: 72 ML | Refills: 1 | Status: SHIPPED | OUTPATIENT
Start: 2023-09-12 | End: 2023-12-11

## 2023-09-21 DIAGNOSIS — M25.552 BILATERAL HIP PAIN: ICD-10-CM

## 2023-09-21 DIAGNOSIS — M25.551 BILATERAL HIP PAIN: ICD-10-CM

## 2023-09-22 RX ORDER — METHOCARBAMOL 750 MG/1
750 TABLET, FILM COATED ORAL 3 TIMES DAILY
Qty: 270 TABLET | Refills: 1 | Status: SHIPPED | OUTPATIENT
Start: 2023-09-22

## 2023-09-25 ENCOUNTER — HOSPITAL ENCOUNTER (EMERGENCY)
Age: 48
Discharge: HOME | End: 2023-09-25
Attending: FAMILY MEDICINE
Payer: COMMERCIAL

## 2023-09-25 VITALS
HEART RATE: 77 BPM | DIASTOLIC BLOOD PRESSURE: 80 MMHG | HEIGHT: 67 IN | TEMPERATURE: 98.6 F | OXYGEN SATURATION: 100 % | WEIGHT: 180 LBS | BODY MASS INDEX: 28.25 KG/M2 | RESPIRATION RATE: 16 BRPM | SYSTOLIC BLOOD PRESSURE: 118 MMHG

## 2023-09-25 DIAGNOSIS — L03.119 CELLULITIS OF FOOT: Primary | ICD-10-CM

## 2023-09-25 PROCEDURE — 99283 EMERGENCY DEPT VISIT LOW MDM: CPT

## 2023-09-25 RX ORDER — FLUTICASONE PROPIONATE 50 MCG
1 SPRAY, SUSPENSION (ML) NASAL DAILY
Qty: 32 G | Refills: 1 | Status: SHIPPED | OUTPATIENT
Start: 2023-09-25

## 2023-09-25 RX ORDER — CEPHALEXIN 500 MG/1
500 CAPSULE ORAL 4 TIMES DAILY
Qty: 40 CAPSULE | Refills: 0 | Status: SHIPPED | OUTPATIENT
Start: 2023-09-25 | End: 2023-10-05

## 2023-10-26 DIAGNOSIS — E11.9 TYPE 2 DIABETES MELLITUS WITHOUT COMPLICATION, WITHOUT LONG-TERM CURRENT USE OF INSULIN (HCC): ICD-10-CM

## 2023-10-26 DIAGNOSIS — Z79.4 TYPE 2 DIABETES MELLITUS WITHOUT COMPLICATION, WITH LONG-TERM CURRENT USE OF INSULIN (HCC): ICD-10-CM

## 2023-10-26 DIAGNOSIS — M25.552 BILATERAL HIP PAIN: ICD-10-CM

## 2023-10-26 DIAGNOSIS — E11.9 TYPE 2 DIABETES MELLITUS WITHOUT COMPLICATION, WITH LONG-TERM CURRENT USE OF INSULIN (HCC): ICD-10-CM

## 2023-10-26 DIAGNOSIS — M25.551 BILATERAL HIP PAIN: ICD-10-CM

## 2023-10-26 RX ORDER — METHOCARBAMOL 750 MG/1
750 TABLET, FILM COATED ORAL 3 TIMES DAILY
Qty: 270 TABLET | Refills: 1 | Status: SHIPPED | OUTPATIENT
Start: 2023-10-26

## 2023-10-26 RX ORDER — LANCETS 30 GAUGE
1 EACH MISCELLANEOUS 2 TIMES DAILY
Qty: 300 EACH | Refills: 1 | Status: SHIPPED | OUTPATIENT
Start: 2023-10-26

## 2023-10-26 RX ORDER — GLUCOSAMINE HCL/CHONDROITIN SU 500-400 MG
CAPSULE ORAL
Qty: 100 STRIP | Refills: 0 | Status: SHIPPED | OUTPATIENT
Start: 2023-10-26

## 2023-11-04 DIAGNOSIS — E11.65 TYPE 2 DIABETES MELLITUS WITH HYPERGLYCEMIA, WITH LONG-TERM CURRENT USE OF INSULIN (HCC): ICD-10-CM

## 2023-11-04 DIAGNOSIS — Z79.4 TYPE 2 DIABETES MELLITUS WITH HYPERGLYCEMIA, WITH LONG-TERM CURRENT USE OF INSULIN (HCC): ICD-10-CM

## 2023-11-04 DIAGNOSIS — F41.9 ANXIETY: ICD-10-CM

## 2023-11-06 DIAGNOSIS — G89.29 CHRONIC PAIN OF LEFT KNEE: Primary | ICD-10-CM

## 2023-11-06 DIAGNOSIS — M25.561 CHRONIC PAIN OF RIGHT KNEE: Primary | ICD-10-CM

## 2023-11-06 DIAGNOSIS — G89.29 CHRONIC PAIN OF RIGHT KNEE: Primary | ICD-10-CM

## 2023-11-06 DIAGNOSIS — M25.562 CHRONIC PAIN OF LEFT KNEE: Primary | ICD-10-CM

## 2023-11-06 RX ORDER — FLUTICASONE PROPIONATE 50 MCG
1 SPRAY, SUSPENSION (ML) NASAL DAILY
Qty: 32 G | Refills: 1 | Status: SHIPPED | OUTPATIENT
Start: 2023-11-06

## 2023-11-06 RX ORDER — ESCITALOPRAM OXALATE 5 MG/1
5 TABLET ORAL DAILY
Qty: 90 TABLET | Refills: 3 | Status: SHIPPED | OUTPATIENT
Start: 2023-11-06

## 2023-11-06 RX ORDER — LIDOCAINE 4 G/G
1 PATCH TOPICAL DAILY
Qty: 30 PATCH | Refills: 0 | Status: SHIPPED | OUTPATIENT
Start: 2023-11-06 | End: 2023-12-06

## 2023-12-05 DIAGNOSIS — F41.9 ANXIETY: ICD-10-CM

## 2023-12-05 RX ORDER — ESCITALOPRAM OXALATE 5 MG/1
5 TABLET ORAL DAILY
Qty: 90 TABLET | Refills: 0 | Status: SHIPPED | OUTPATIENT
Start: 2023-12-05

## 2023-12-07 ENCOUNTER — HOSPITAL ENCOUNTER (EMERGENCY)
Age: 48
Discharge: HOME | End: 2023-12-07
Attending: EMERGENCY MEDICINE
Payer: COMMERCIAL

## 2023-12-07 VITALS
TEMPERATURE: 97.9 F | BODY MASS INDEX: 30.54 KG/M2 | SYSTOLIC BLOOD PRESSURE: 109 MMHG | HEART RATE: 88 BPM | OXYGEN SATURATION: 98 % | DIASTOLIC BLOOD PRESSURE: 78 MMHG | WEIGHT: 195 LBS

## 2023-12-07 DIAGNOSIS — J06.9 ACUTE UPPER RESPIRATORY INFECTION: Primary | ICD-10-CM

## 2023-12-07 LAB
BILIRUB UR QL STRIP: NEGATIVE
CLARITY UR: CLEAR
COLOR UR: YELLOW
GLUCOSE UR STRIP-MCNC: NEGATIVE MG/DL
HGB UR QL STRIP.AUTO: NEGATIVE
KETONES UR STRIP-MCNC: NEGATIVE MG/DL
LEUKOCYTE ESTERASE UR QL STRIP: NEGATIVE
NITRITE UR QL STRIP: NEGATIVE
PH UR STRIP: 5.5 [PH] (ref 5–9)
PROT UR STRIP-MCNC: NEGATIVE MG/DL
RBC #/AREA URNS HPF: NORMAL /HPF
SP GR UR STRIP: 1.02 (ref 1–1.03)
UROBILINOGEN UR STRIP-ACNC: 0.2 EU/DL (ref 0–1)
WBC #/AREA URNS HPF: NORMAL /HPF

## 2023-12-07 PROCEDURE — 99283 EMERGENCY DEPT VISIT LOW MDM: CPT

## 2023-12-07 PROCEDURE — 81001 URINALYSIS AUTO W/SCOPE: CPT

## 2023-12-07 RX ORDER — CEFDINIR 300 MG/1
300 CAPSULE ORAL 2 TIMES DAILY
Qty: 20 CAPSULE | Refills: 0 | Status: SHIPPED | OUTPATIENT
Start: 2023-12-07 | End: 2023-12-17

## 2023-12-07 RX ORDER — DEXTROMETHORPHAN HYDROBROMIDE AND PROMETHAZINE HYDROCHLORIDE 15; 6.25 MG/5ML; MG/5ML
5 SYRUP ORAL 4 TIMES DAILY PRN
Qty: 120 ML | Refills: 0 | Status: SHIPPED | OUTPATIENT
Start: 2023-12-07 | End: 2023-12-14

## 2023-12-07 ASSESSMENT — ENCOUNTER SYMPTOMS
ABDOMINAL PAIN: 0
COUGH: 1
WHEEZING: 0
NAUSEA: 0
RHINORRHEA: 1
EYE REDNESS: 0
BACK PAIN: 1
SORE THROAT: 0
BLOOD IN STOOL: 0
VOMITING: 0
DIARRHEA: 0
EYE DISCHARGE: 0
SHORTNESS OF BREATH: 0
EYE PAIN: 0
ABDOMINAL DISTENTION: 0
SINUS PRESSURE: 0

## 2023-12-07 NOTE — ED PROVIDER NOTES
Also urinary frequency    The history is provided by the patient. URI  Presenting symptoms: congestion, cough and rhinorrhea    Presenting symptoms: no ear pain, no fatigue, no fever and no sore throat    Severity:  Moderate  Onset quality:  Gradual  Duration:  4 days  Chronicity:  New  Associated symptoms: no arthralgias, no headaches and no wheezing         Review of Systems   Constitutional:  Negative for chills, fatigue and fever. HENT:  Positive for congestion and rhinorrhea. Negative for ear pain, sinus pressure and sore throat. Eyes:  Negative for pain, discharge and redness. Respiratory:  Positive for cough. Negative for shortness of breath and wheezing. Cardiovascular:  Negative for chest pain. Gastrointestinal:  Negative for abdominal distention, abdominal pain, blood in stool, diarrhea, nausea and vomiting. Genitourinary:  Positive for frequency. Negative for dysuria. Musculoskeletal:  Positive for back pain. Negative for arthralgias. Skin:  Negative for rash and wound. Neurological:  Negative for weakness and headaches. Hematological:  Negative for adenopathy. All other systems reviewed and are negative. Physical Exam  Vitals and nursing note reviewed. Constitutional:       Appearance: She is well-developed. HENT:      Head: Normocephalic and atraumatic. Right Ear: Hearing and external ear normal. Tympanic membrane is retracted. Left Ear: Hearing and external ear normal. Tympanic membrane is retracted. Nose: Mucosal edema and congestion present. Mouth/Throat:      Pharynx: Uvula midline. Eyes:      General: Lids are normal.      Conjunctiva/sclera: Conjunctivae normal.      Pupils: Pupils are equal, round, and reactive to light. Cardiovascular:      Rate and Rhythm: Normal rate and regular rhythm. Heart sounds: Normal heart sounds. No murmur heard. Pulmonary:      Effort: Pulmonary effort is normal. No respiratory distress.       Breath

## 2023-12-11 ENCOUNTER — OFFICE VISIT (OUTPATIENT)
Dept: PRIMARY CARE CLINIC | Age: 48
End: 2023-12-11
Payer: COMMERCIAL

## 2023-12-11 VITALS
HEIGHT: 67 IN | HEART RATE: 74 BPM | TEMPERATURE: 97.8 F | OXYGEN SATURATION: 97 % | DIASTOLIC BLOOD PRESSURE: 84 MMHG | SYSTOLIC BLOOD PRESSURE: 110 MMHG | BODY MASS INDEX: 30.54 KG/M2

## 2023-12-11 DIAGNOSIS — R10.9 FLANK PAIN: ICD-10-CM

## 2023-12-11 DIAGNOSIS — R09.81 SINUS CONGESTION: ICD-10-CM

## 2023-12-11 DIAGNOSIS — R30.0 DYSURIA: ICD-10-CM

## 2023-12-11 DIAGNOSIS — E11.65 TYPE 2 DIABETES MELLITUS WITH HYPERGLYCEMIA, WITH LONG-TERM CURRENT USE OF INSULIN (HCC): Primary | ICD-10-CM

## 2023-12-11 DIAGNOSIS — Z79.4 TYPE 2 DIABETES MELLITUS WITH HYPERGLYCEMIA, WITH LONG-TERM CURRENT USE OF INSULIN (HCC): Primary | ICD-10-CM

## 2023-12-11 LAB
BILIRUBIN, POC: NORMAL
BLOOD URINE, POC: NORMAL
CLARITY, POC: CLEAR
COLOR, POC: YELLOW
GLUCOSE URINE, POC: NORMAL
HBA1C MFR BLD: 8.2 %
KETONES, POC: NORMAL
LEUKOCYTE EST, POC: NORMAL
Lab: NORMAL
NITRITE, POC: NORMAL
PERFORMING INSTRUMENT: NORMAL
PH, POC: 6
PROTEIN, POC: NORMAL
QC PASS/FAIL: NORMAL
SARS-COV-2, POC: NORMAL
SPECIFIC GRAVITY, POC: >=1.03
UROBILINOGEN, POC: 0.2

## 2023-12-11 PROCEDURE — 87426 SARSCOV CORONAVIRUS AG IA: CPT | Performed by: STUDENT IN AN ORGANIZED HEALTH CARE EDUCATION/TRAINING PROGRAM

## 2023-12-11 PROCEDURE — 3052F HG A1C>EQUAL 8.0%<EQUAL 9.0%: CPT | Performed by: STUDENT IN AN ORGANIZED HEALTH CARE EDUCATION/TRAINING PROGRAM

## 2023-12-11 PROCEDURE — 4004F PT TOBACCO SCREEN RCVD TLK: CPT | Performed by: STUDENT IN AN ORGANIZED HEALTH CARE EDUCATION/TRAINING PROGRAM

## 2023-12-11 PROCEDURE — G8484 FLU IMMUNIZE NO ADMIN: HCPCS | Performed by: STUDENT IN AN ORGANIZED HEALTH CARE EDUCATION/TRAINING PROGRAM

## 2023-12-11 PROCEDURE — 83036 HEMOGLOBIN GLYCOSYLATED A1C: CPT | Performed by: STUDENT IN AN ORGANIZED HEALTH CARE EDUCATION/TRAINING PROGRAM

## 2023-12-11 PROCEDURE — 99214 OFFICE O/P EST MOD 30 MIN: CPT | Performed by: STUDENT IN AN ORGANIZED HEALTH CARE EDUCATION/TRAINING PROGRAM

## 2023-12-11 PROCEDURE — 81002 URINALYSIS NONAUTO W/O SCOPE: CPT | Performed by: STUDENT IN AN ORGANIZED HEALTH CARE EDUCATION/TRAINING PROGRAM

## 2023-12-11 PROCEDURE — 2022F DILAT RTA XM EVC RTNOPTHY: CPT | Performed by: STUDENT IN AN ORGANIZED HEALTH CARE EDUCATION/TRAINING PROGRAM

## 2023-12-11 PROCEDURE — G8427 DOCREV CUR MEDS BY ELIG CLIN: HCPCS | Performed by: STUDENT IN AN ORGANIZED HEALTH CARE EDUCATION/TRAINING PROGRAM

## 2023-12-11 PROCEDURE — G8417 CALC BMI ABV UP PARAM F/U: HCPCS | Performed by: STUDENT IN AN ORGANIZED HEALTH CARE EDUCATION/TRAINING PROGRAM

## 2023-12-11 PROCEDURE — 3079F DIAST BP 80-89 MM HG: CPT | Performed by: STUDENT IN AN ORGANIZED HEALTH CARE EDUCATION/TRAINING PROGRAM

## 2023-12-11 PROCEDURE — 3074F SYST BP LT 130 MM HG: CPT | Performed by: STUDENT IN AN ORGANIZED HEALTH CARE EDUCATION/TRAINING PROGRAM

## 2023-12-11 RX ORDER — DILTIAZEM HYDROCHLORIDE 60 MG/1
2 TABLET, FILM COATED ORAL 2 TIMES DAILY
Qty: 10.2 G | Refills: 1 | Status: SHIPPED | OUTPATIENT
Start: 2023-12-11

## 2023-12-11 RX ORDER — LEVOFLOXACIN 750 MG/1
750 TABLET, FILM COATED ORAL DAILY
Qty: 10 TABLET | Refills: 0 | Status: SHIPPED | OUTPATIENT
Start: 2023-12-11 | End: 2023-12-21

## 2023-12-11 RX ORDER — DULAGLUTIDE 3 MG/.5ML
3 INJECTION, SOLUTION SUBCUTANEOUS WEEKLY
Qty: 6 ADJUSTABLE DOSE PRE-FILLED PEN SYRINGE | Refills: 1 | Status: SHIPPED | OUTPATIENT
Start: 2023-12-11

## 2023-12-11 ASSESSMENT — ENCOUNTER SYMPTOMS
GASTROINTESTINAL NEGATIVE: 1
RESPIRATORY NEGATIVE: 1

## 2023-12-14 DIAGNOSIS — N30.90 CYSTITIS: Primary | ICD-10-CM

## 2023-12-14 LAB
CULTURE: ABNORMAL
SPECIMEN DESCRIPTION: ABNORMAL

## 2023-12-14 RX ORDER — SULFAMETHOXAZOLE AND TRIMETHOPRIM 800; 160 MG/1; MG/1
1 TABLET ORAL 2 TIMES DAILY
Qty: 14 TABLET | Refills: 0 | Status: SHIPPED | OUTPATIENT
Start: 2023-12-14 | End: 2023-12-21

## 2024-02-08 DIAGNOSIS — J30.2 SEASONAL ALLERGIC RHINITIS, UNSPECIFIED TRIGGER: ICD-10-CM

## 2024-02-08 DIAGNOSIS — R09.81 SINUS CONGESTION: ICD-10-CM

## 2024-02-09 RX ORDER — DILTIAZEM HYDROCHLORIDE 60 MG/1
2 TABLET, FILM COATED ORAL 2 TIMES DAILY
Qty: 10.2 G | Refills: 1 | Status: SHIPPED | OUTPATIENT
Start: 2024-02-09

## 2024-02-11 DIAGNOSIS — M25.551 BILATERAL HIP PAIN: ICD-10-CM

## 2024-02-11 DIAGNOSIS — Z79.4 TYPE 2 DIABETES MELLITUS WITH HYPERGLYCEMIA, WITH LONG-TERM CURRENT USE OF INSULIN (HCC): ICD-10-CM

## 2024-02-11 DIAGNOSIS — E11.65 TYPE 2 DIABETES MELLITUS WITH HYPERGLYCEMIA, WITH LONG-TERM CURRENT USE OF INSULIN (HCC): ICD-10-CM

## 2024-02-11 DIAGNOSIS — F41.9 ANXIETY: ICD-10-CM

## 2024-02-11 DIAGNOSIS — R09.89 CHEST CONGESTION: ICD-10-CM

## 2024-02-11 DIAGNOSIS — M25.552 BILATERAL HIP PAIN: ICD-10-CM

## 2024-02-13 RX ORDER — BROMPHENIRAMINE MALEATE, PSEUDOEPHEDRINE HYDROCHLORIDE, AND DEXTROMETHORPHAN HYDROBROMIDE 2; 30; 10 MG/5ML; MG/5ML; MG/5ML
5 SYRUP ORAL 4 TIMES DAILY PRN
Qty: 473 ML | Refills: 0 | OUTPATIENT
Start: 2024-02-13

## 2024-02-13 RX ORDER — FLUTICASONE PROPIONATE 50 MCG
1 SPRAY, SUSPENSION (ML) NASAL DAILY
Qty: 32 G | Refills: 2 | Status: SHIPPED | OUTPATIENT
Start: 2024-02-13

## 2024-02-13 RX ORDER — ESCITALOPRAM OXALATE 5 MG/1
5 TABLET ORAL DAILY
Qty: 90 TABLET | Refills: 0 | Status: SHIPPED | OUTPATIENT
Start: 2024-02-13

## 2024-02-13 RX ORDER — METHOCARBAMOL 750 MG/1
750 TABLET, FILM COATED ORAL 3 TIMES DAILY
Qty: 270 TABLET | Refills: 0 | Status: SHIPPED | OUTPATIENT
Start: 2024-02-13

## 2024-02-13 RX ORDER — DULAGLUTIDE 3 MG/.5ML
3 INJECTION, SOLUTION SUBCUTANEOUS WEEKLY
Qty: 6 ADJUSTABLE DOSE PRE-FILLED PEN SYRINGE | Refills: 1 | Status: SHIPPED | OUTPATIENT
Start: 2024-02-13

## 2024-03-03 ENCOUNTER — HOSPITAL ENCOUNTER (EMERGENCY)
Age: 49
Discharge: HOME | End: 2024-03-03
Payer: COMMERCIAL

## 2024-03-03 VITALS
DIASTOLIC BLOOD PRESSURE: 83 MMHG | RESPIRATION RATE: 26 BRPM | TEMPERATURE: 98.2 F | WEIGHT: 195 LBS | BODY MASS INDEX: 30.54 KG/M2 | OXYGEN SATURATION: 96 % | HEART RATE: 101 BPM | SYSTOLIC BLOOD PRESSURE: 119 MMHG

## 2024-03-03 DIAGNOSIS — J40 BRONCHITIS: Primary | ICD-10-CM

## 2024-03-03 DIAGNOSIS — F17.200 SMOKER: ICD-10-CM

## 2024-03-03 LAB
INFLUENZA A BY PCR: NOT DETECTED
INFLUENZA B BY PCR: NOT DETECTED

## 2024-03-03 PROCEDURE — 87502 INFLUENZA DNA AMP PROBE: CPT

## 2024-03-03 PROCEDURE — 99211 OFF/OP EST MAY X REQ PHY/QHP: CPT

## 2024-03-03 RX ORDER — BROMPHENIRAMINE MALEATE, PSEUDOEPHEDRINE HYDROCHLORIDE, AND DEXTROMETHORPHAN HYDROBROMIDE 2; 30; 10 MG/5ML; MG/5ML; MG/5ML
5 SYRUP ORAL 4 TIMES DAILY PRN
Qty: 118 ML | Refills: 0 | Status: SHIPPED | OUTPATIENT
Start: 2024-03-03

## 2024-03-03 RX ORDER — PREDNISONE 50 MG/1
50 TABLET ORAL DAILY
Qty: 5 TABLET | Refills: 0 | Status: SHIPPED | OUTPATIENT
Start: 2024-03-03 | End: 2024-03-08

## 2024-03-03 RX ORDER — IPRATROPIUM BROMIDE AND ALBUTEROL SULFATE 2.5; .5 MG/3ML; MG/3ML
1 SOLUTION RESPIRATORY (INHALATION) EVERY 6 HOURS PRN
Qty: 360 ML | Refills: 0 | Status: SHIPPED | OUTPATIENT
Start: 2024-03-03

## 2024-03-03 RX ORDER — AZITHROMYCIN 250 MG/1
TABLET, FILM COATED ORAL
Qty: 1 PACKET | Refills: 0 | Status: SHIPPED | OUTPATIENT
Start: 2024-03-03 | End: 2024-03-07

## 2024-03-03 NOTE — ED PROVIDER NOTES
Department of Emergency Medicine   ED  Encounter Note  Admit Date/RoomTime: 3/3/2024  2:29 PM  ED Room:     NAME: Aishwarya Bonner  : 1975  MRN: [id number]     Chief Complaint:  Cough (Cough, had COVID last month, still coughing and SOB)    History of Present Illness       Aishwarya Bonner is a 48 y.o. old female who presents to the urgent care by private vehicle, for wheezing, chest congestion and cough ongoing for approximately 1 month.  She states she had COVID-19 at that time.  She states she never really had any improvement in her symptoms.  She denies any chest pain, hemoptysis, leg edema.  She denies possibility of pregnancy.    ROS   Pertinent positives and negatives are stated within HPI, all other systems reviewed and are negative.    Past Medical History:  has a past medical history of BRCA1 gene mutation positive, Breast cancer (HCC), Cancer (HCC), Diabetes mellitus (HCC), Headache(784.0), Hyperlipidemia, Hypertension, Kidney stone, Lumbar herniated disc, Migraine, and Pancreatitis.    Surgical History:  has a past surgical history that includes Cholecystectomy; Tubal ligation; LEEP; Tonsillectomy; Tibia fracture surgery; Ankle surgery; Breast biopsy; Breast lumpectomy; Hysterectomy; Hip fracture surgery (Left, 2022); US BREAST BIOPSY W LOC DEVICE 1ST LESION RIGHT (Right, 2023); and US BREAST BIOPSY W LOC DEVICE 1ST LESION LEFT (Left, 2023).    Social History:  reports that she has been smoking cigarettes. She has a 0.5 pack-year smoking history. She has never used smokeless tobacco. She reports current drug use. Drug: Marijuana (Weed). She reports that she does not drink alcohol.    Family History: family history includes Alcohol Abuse in her father; Asthma in her daughter and son; Brain Cancer in her daughter; Cancer in her father, maternal grandfather, maternal grandmother, paternal grandfather, and paternal grandmother; Diabetes in her maternal grandmother and mother;

## 2024-03-05 ENCOUNTER — TELEPHONE (OUTPATIENT)
Dept: PRIMARY CARE CLINIC | Age: 49
End: 2024-03-05

## 2024-03-05 DIAGNOSIS — J40 BRONCHITIS: Primary | ICD-10-CM

## 2024-03-05 NOTE — TELEPHONE ENCOUNTER
Patient needs script for nebulizer sent to [facility] due to having pneumonia and her current machine breaking down cannot get into new pcp until 03/20

## 2024-03-06 ENCOUNTER — TELEPHONE (OUTPATIENT)
Dept: PRIMARY CARE CLINIC | Age: 49
End: 2024-03-06

## 2024-03-06 NOTE — TELEPHONE ENCOUNTER
[facility] called and stated they can not do the order for a nebulizer due to not having a office visit the same day the order was made, pt goes to see kwadwo 3/20/24.

## 2024-03-08 ENCOUNTER — HOSPITAL ENCOUNTER (EMERGENCY)
Age: 49
Discharge: HOME | End: 2024-03-08
Attending: EMERGENCY MEDICINE
Payer: COMMERCIAL

## 2024-03-08 ENCOUNTER — APPOINTMENT (OUTPATIENT)
Dept: CT IMAGING | Age: 49
End: 2024-03-08
Payer: COMMERCIAL

## 2024-03-08 VITALS
SYSTOLIC BLOOD PRESSURE: 132 MMHG | OXYGEN SATURATION: 98 % | TEMPERATURE: 97.3 F | DIASTOLIC BLOOD PRESSURE: 82 MMHG | HEART RATE: 77 BPM | RESPIRATION RATE: 20 BRPM

## 2024-03-08 DIAGNOSIS — R05.9 COUGH, UNSPECIFIED TYPE: Primary | ICD-10-CM

## 2024-03-08 LAB
ALBUMIN SERPL-MCNC: 4 G/DL (ref 3.5–5.2)
ALP SERPL-CCNC: 128 U/L (ref 35–104)
ALT SERPL-CCNC: 10 U/L (ref 0–32)
ANION GAP SERPL CALCULATED.3IONS-SCNC: 10 MMOL/L (ref 7–16)
AST SERPL-CCNC: 9 U/L (ref 0–31)
BASOPHILS # BLD: 0.07 K/UL (ref 0–0.2)
BASOPHILS NFR BLD: 1 % (ref 0–2)
BILIRUB SERPL-MCNC: <0.2 MG/DL (ref 0–1.2)
BUN SERPL-MCNC: 6 MG/DL (ref 6–20)
CALCIUM SERPL-MCNC: 9.5 MG/DL (ref 8.6–10.2)
CHLORIDE SERPL-SCNC: 97 MMOL/L (ref 98–107)
CO2 SERPL-SCNC: 28 MMOL/L (ref 22–29)
CREAT SERPL-MCNC: 0.6 MG/DL (ref 0.5–1)
EOSINOPHIL # BLD: 0.08 K/UL (ref 0.05–0.5)
EOSINOPHILS RELATIVE PERCENT: 1 % (ref 0–6)
ERYTHROCYTE [DISTWIDTH] IN BLOOD BY AUTOMATED COUNT: 13.2 % (ref 11.5–15)
GFR SERPL CREATININE-BSD FRML MDRD: >60 ML/MIN/1.73M2
GLUCOSE SERPL-MCNC: 326 MG/DL (ref 74–99)
HCG, URINE, POC: NEGATIVE
HCT VFR BLD AUTO: 44.2 % (ref 34–48)
HGB BLD-MCNC: 14.9 G/DL (ref 11.5–15.5)
IMM GRANULOCYTES # BLD AUTO: 0.06 K/UL (ref 0–0.58)
IMM GRANULOCYTES NFR BLD: 1 % (ref 0–5)
INFLUENZA A BY PCR: NOT DETECTED
INFLUENZA B BY PCR: NOT DETECTED
LYMPHOCYTES NFR BLD: 3.31 K/UL (ref 1.5–4)
LYMPHOCYTES RELATIVE PERCENT: 27 % (ref 20–42)
Lab: NORMAL
MCH RBC QN AUTO: 29.9 PG (ref 26–35)
MCHC RBC AUTO-ENTMCNC: 33.7 G/DL (ref 32–34.5)
MCV RBC AUTO: 88.6 FL (ref 80–99.9)
MONOCYTES NFR BLD: 0.57 K/UL (ref 0.1–0.95)
MONOCYTES NFR BLD: 5 % (ref 2–12)
NEGATIVE QC PASS/FAIL: NORMAL
NEUTROPHILS NFR BLD: 67 % (ref 43–80)
NEUTS SEG NFR BLD: 8.33 K/UL (ref 1.8–7.3)
PLATELET # BLD AUTO: 346 K/UL (ref 130–450)
PMV BLD AUTO: 10.9 FL (ref 7–12)
POSITIVE QC PASS/FAIL: NORMAL
POTASSIUM SERPL-SCNC: 4.2 MMOL/L (ref 3.5–5)
PROT SERPL-MCNC: 6.9 G/DL (ref 6.4–8.3)
RBC # BLD AUTO: 4.99 M/UL (ref 3.5–5.5)
SARS-COV-2 RDRP RESP QL NAA+PROBE: NOT DETECTED
SODIUM SERPL-SCNC: 135 MMOL/L (ref 132–146)
SPECIMEN DESCRIPTION: NORMAL
TROPONIN I SERPL HS-MCNC: 7 NG/L (ref 0–9)
TROPONIN I SERPL HS-MCNC: 8 NG/L (ref 0–9)
WBC OTHER # BLD: 12.4 K/UL (ref 4.5–11.5)

## 2024-03-08 PROCEDURE — 80053 COMPREHEN METABOLIC PANEL: CPT

## 2024-03-08 PROCEDURE — 94640 AIRWAY INHALATION TREATMENT: CPT

## 2024-03-08 PROCEDURE — 93005 ELECTROCARDIOGRAM TRACING: CPT

## 2024-03-08 PROCEDURE — 96361 HYDRATE IV INFUSION ADD-ON: CPT

## 2024-03-08 PROCEDURE — 6370000000 HC RX 637 (ALT 250 FOR IP)

## 2024-03-08 PROCEDURE — 85025 COMPLETE CBC W/AUTO DIFF WBC: CPT

## 2024-03-08 PROCEDURE — 6360000002 HC RX W HCPCS

## 2024-03-08 PROCEDURE — 71275 CT ANGIOGRAPHY CHEST: CPT

## 2024-03-08 PROCEDURE — 87502 INFLUENZA DNA AMP PROBE: CPT

## 2024-03-08 PROCEDURE — 99285 EMERGENCY DEPT VISIT HI MDM: CPT

## 2024-03-08 PROCEDURE — 2580000003 HC RX 258

## 2024-03-08 PROCEDURE — 6360000004 HC RX CONTRAST MEDICATION: Performed by: RADIOLOGY

## 2024-03-08 PROCEDURE — 96374 THER/PROPH/DIAG INJ IV PUSH: CPT

## 2024-03-08 PROCEDURE — 87635 SARS-COV-2 COVID-19 AMP PRB: CPT

## 2024-03-08 PROCEDURE — 84484 ASSAY OF TROPONIN QUANT: CPT

## 2024-03-08 RX ORDER — IPRATROPIUM BROMIDE AND ALBUTEROL SULFATE 2.5; .5 MG/3ML; MG/3ML
1 SOLUTION RESPIRATORY (INHALATION) ONCE
Status: COMPLETED | OUTPATIENT
Start: 2024-03-08 | End: 2024-03-08

## 2024-03-08 RX ORDER — 0.9 % SODIUM CHLORIDE 0.9 %
1000 INTRAVENOUS SOLUTION INTRAVENOUS ONCE
Status: COMPLETED | OUTPATIENT
Start: 2024-03-08 | End: 2024-03-08

## 2024-03-08 RX ORDER — KETOROLAC TROMETHAMINE 15 MG/ML
15 INJECTION, SOLUTION INTRAMUSCULAR; INTRAVENOUS ONCE
Status: COMPLETED | OUTPATIENT
Start: 2024-03-08 | End: 2024-03-08

## 2024-03-08 RX ADMIN — SODIUM CHLORIDE 1000 ML: 9 INJECTION, SOLUTION INTRAVENOUS at 16:56

## 2024-03-08 RX ADMIN — KETOROLAC TROMETHAMINE 15 MG: 15 INJECTION, SOLUTION INTRAMUSCULAR; INTRAVENOUS at 16:57

## 2024-03-08 RX ADMIN — IPRATROPIUM BROMIDE AND ALBUTEROL SULFATE 1 DOSE: 2.5; .5 SOLUTION RESPIRATORY (INHALATION) at 15:27

## 2024-03-08 RX ADMIN — IOPAMIDOL 75 ML: 755 INJECTION, SOLUTION INTRAVENOUS at 18:15

## 2024-03-08 ASSESSMENT — PAIN SCALES - GENERAL
PAINLEVEL_OUTOF10: 4
PAINLEVEL_OUTOF10: 7

## 2024-03-08 ASSESSMENT — PAIN - FUNCTIONAL ASSESSMENT: PAIN_FUNCTIONAL_ASSESSMENT: 0-10

## 2024-03-08 ASSESSMENT — PAIN DESCRIPTION - LOCATION: LOCATION: CHEST

## 2024-03-08 NOTE — ED PROVIDER NOTES
inspiration.  Abdomen soft and nontender.  Arms legs are well-perfused and neurovascular intact with no pretibial edema or calf pain.       [NC]   1703 EKG normal sinus rhythm rate of 94, normal axis, normal conduction, no ischemic ST-T wave changes, otherwise agree with resident [NC]   1704 EKG Interpretation  Interpreted by emergency department physician.    3/8/24  Time: 16:52    Rate: 94 bpm  Axis: normal  RI: 136 ms  QRS: 84 ms  Qtc: 425 ms  Rhythm: regular  Clinical Impression: normal sinus rhythm with nonspecific T wave abnormality  Comparison to old EKG: changes when compared to pt's most recent      [AD]      ED Course User Index  [AD] Tiffany Iverson DO  [NC] Joseph Ramirez, DO       Medications   ipratropium 0.5 mg-albuterol 2.5 mg (DUONEB) nebulizer solution 1 Dose (1 Dose Inhalation Given 3/8/24 1527)   sodium chloride 0.9 % bolus 1,000 mL (0 mLs IntraVENous Stopped 3/8/24 1952)   ketorolac (TORADOL) injection 15 mg (15 mg IntraVENous Given 3/8/24 1657)   iopamidol (ISOVUE-370) 76 % injection 75 mL (75 mLs IntraVENous Given 3/8/24 1815)       Discharge Medication List as of 3/8/2024  7:45 PM            Counseling:   The emergency provider has spoken with the patient and spouse/SO and discussed today’s results, in addition to providing specific details for the plan of care and counseling regarding the diagnosis and prognosis.  Questions are answered at this time and they are agreeable with the plan.       --------------------------------- IMPRESSION AND DISPOSITION ---------------------------------    IMPRESSION  1. Cough, unspecified type        DISPOSITION  Disposition: Discharge to home  Patient condition is stable        NOTE: This report was transcribed using voice recognition software. Every effort was made to ensure accuracy; however, inadvertent computerized transcription errors may be present

## 2024-03-08 NOTE — ED TRIAGE NOTES
Pt post covid, then states she had pneumonia. Now a month later cough is still present and feels deep. Seen at urgent care. Tx not helping

## 2024-03-09 LAB
EKG ATRIAL RATE: 94 BPM
EKG P AXIS: 52 DEGREES
EKG P-R INTERVAL: 136 MS
EKG Q-T INTERVAL: 340 MS
EKG QRS DURATION: 84 MS
EKG QTC CALCULATION (BAZETT): 425 MS
EKG R AXIS: 66 DEGREES
EKG T AXIS: 19 DEGREES
EKG VENTRICULAR RATE: 94 BPM

## 2024-03-09 PROCEDURE — 93010 ELECTROCARDIOGRAM REPORT: CPT | Performed by: INTERNAL MEDICINE

## 2024-03-09 NOTE — DISCHARGE INSTRUCTIONS
Please return to the ER for any new or worsening symptoms including but not limited to Fever, Inability to keep down liquids, or Numbness or weakness anywhere  If prescribed, please be sure to  your prescriptions from the pharmacy  Please follow-up with Primary care provider as instructed

## 2024-04-09 DIAGNOSIS — R09.81 SINUS CONGESTION: ICD-10-CM

## 2024-04-09 RX ORDER — DILTIAZEM HYDROCHLORIDE 60 MG/1
2 TABLET, FILM COATED ORAL 2 TIMES DAILY
Qty: 10.2 G | Refills: 1 | OUTPATIENT
Start: 2024-04-09

## 2024-05-18 ENCOUNTER — TRANSCRIBE ORDERS (OUTPATIENT)
Dept: ADMINISTRATIVE | Age: 49
End: 2024-05-18

## 2024-05-18 DIAGNOSIS — Z12.31 ENCOUNTER FOR SCREENING MAMMOGRAM FOR MALIGNANT NEOPLASM OF BREAST: Primary | ICD-10-CM

## 2025-05-22 ENCOUNTER — HOSPITAL ENCOUNTER (OUTPATIENT)
Age: 50
Discharge: HOME | End: 2025-05-22
Payer: COMMERCIAL

## 2025-05-22 LAB
ALBUMIN SERPL-MCNC: 4.1 G/DL (ref 3.5–5.2)
ALP SERPL-CCNC: 155 U/L (ref 35–104)
ALT SERPL-CCNC: 16 U/L (ref 0–32)
AST SERPL-CCNC: 15 U/L (ref 0–31)
BILIRUB DIRECT SERPL-MCNC: <0.2 MG/DL (ref 0–0.3)
BILIRUB INDIRECT SERPL-MCNC: ABNORMAL MG/DL (ref 0–1)
BILIRUB SERPL-MCNC: 0.3 MG/DL (ref 0–1.2)
CHOLEST SERPL-MCNC: 120 MG/DL
HDLC SERPL-MCNC: 53 MG/DL
LDLC SERPL CALC-MCNC: 57 MG/DL
PROT SERPL-MCNC: 6.9 G/DL (ref 6.4–8.3)
TRIGL SERPL-MCNC: 51 MG/DL
VLDLC SERPL CALC-MCNC: 10 MG/DL

## 2025-05-22 PROCEDURE — 80061 LIPID PANEL: CPT

## 2025-05-22 PROCEDURE — 80076 HEPATIC FUNCTION PANEL: CPT

## 2025-05-22 PROCEDURE — 36415 COLL VENOUS BLD VENIPUNCTURE: CPT

## 2025-06-23 ENCOUNTER — APPOINTMENT (OUTPATIENT)
Dept: GENERAL RADIOLOGY | Age: 50
End: 2025-06-23
Payer: COMMERCIAL

## 2025-06-23 ENCOUNTER — HOSPITAL ENCOUNTER (EMERGENCY)
Age: 50
Discharge: HOME | End: 2025-06-23
Attending: EMERGENCY MEDICINE
Payer: COMMERCIAL

## 2025-06-23 VITALS
OXYGEN SATURATION: 96 % | HEART RATE: 87 BPM | RESPIRATION RATE: 20 BRPM | SYSTOLIC BLOOD PRESSURE: 126 MMHG | DIASTOLIC BLOOD PRESSURE: 72 MMHG | TEMPERATURE: 98.3 F

## 2025-06-23 DIAGNOSIS — R07.9 CHEST PAIN, UNSPECIFIED TYPE: Primary | ICD-10-CM

## 2025-06-23 LAB
ALBUMIN SERPL-MCNC: 4 G/DL (ref 3.5–5.2)
ALP SERPL-CCNC: 150 U/L (ref 35–104)
ALT SERPL-CCNC: 15 U/L (ref 0–35)
ANION GAP SERPL CALCULATED.3IONS-SCNC: 9 MMOL/L (ref 7–16)
AST SERPL-CCNC: 15 U/L (ref 0–35)
BASOPHILS # BLD: 0.06 K/UL (ref 0–0.2)
BASOPHILS NFR BLD: 1 % (ref 0–2)
BILIRUB SERPL-MCNC: 0.3 MG/DL (ref 0–1.2)
BUN SERPL-MCNC: 15 MG/DL (ref 6–20)
CALCIUM SERPL-MCNC: 9.2 MG/DL (ref 8.6–10)
CHLORIDE SERPL-SCNC: 103 MMOL/L (ref 98–107)
CO2 SERPL-SCNC: 26 MMOL/L (ref 22–29)
CREAT SERPL-MCNC: 0.7 MG/DL (ref 0.5–1)
EOSINOPHIL # BLD: 0.14 K/UL (ref 0.05–0.5)
EOSINOPHILS RELATIVE PERCENT: 2 % (ref 0–6)
ERYTHROCYTE [DISTWIDTH] IN BLOOD BY AUTOMATED COUNT: 14.9 % (ref 11.5–15)
GFR, ESTIMATED: >90 ML/MIN/1.73M2
GLUCOSE SERPL-MCNC: 165 MG/DL (ref 74–99)
HCT VFR BLD AUTO: 38.4 % (ref 34–48)
HGB BLD-MCNC: 12.5 G/DL (ref 11.5–15.5)
IMM GRANULOCYTES # BLD AUTO: 0.04 K/UL (ref 0–0.58)
IMM GRANULOCYTES NFR BLD: 1 % (ref 0–5)
LYMPHOCYTES NFR BLD: 2.38 K/UL (ref 1.5–4)
LYMPHOCYTES RELATIVE PERCENT: 27 % (ref 20–42)
MCH RBC QN AUTO: 27.6 PG (ref 26–35)
MCHC RBC AUTO-ENTMCNC: 32.6 G/DL (ref 32–34.5)
MCV RBC AUTO: 84.8 FL (ref 80–99.9)
MONOCYTES NFR BLD: 0.53 K/UL (ref 0.1–0.95)
MONOCYTES NFR BLD: 6 % (ref 2–12)
NEUTROPHILS NFR BLD: 64 % (ref 43–80)
NEUTS SEG NFR BLD: 5.58 K/UL (ref 1.8–7.3)
PLATELET # BLD AUTO: 292 K/UL (ref 130–450)
PMV BLD AUTO: 11.1 FL (ref 7–12)
POTASSIUM SERPL-SCNC: 4.2 MMOL/L (ref 3.5–5.1)
PROT SERPL-MCNC: 6.8 G/DL (ref 6.4–8.3)
RBC # BLD AUTO: 4.53 M/UL (ref 3.5–5.5)
SODIUM SERPL-SCNC: 138 MMOL/L (ref 136–145)
TROPONIN I SERPL HS-MCNC: <6 NG/L (ref 0–14)
WBC OTHER # BLD: 8.7 K/UL (ref 4.5–11.5)

## 2025-06-23 PROCEDURE — 85025 COMPLETE CBC W/AUTO DIFF WBC: CPT

## 2025-06-23 PROCEDURE — 6370000000 HC RX 637 (ALT 250 FOR IP): Performed by: PHYSICIAN ASSISTANT

## 2025-06-23 PROCEDURE — 93005 ELECTROCARDIOGRAM TRACING: CPT | Performed by: PHYSICIAN ASSISTANT

## 2025-06-23 PROCEDURE — 80053 COMPREHEN METABOLIC PANEL: CPT

## 2025-06-23 PROCEDURE — 84484 ASSAY OF TROPONIN QUANT: CPT

## 2025-06-23 PROCEDURE — 99285 EMERGENCY DEPT VISIT HI MDM: CPT

## 2025-06-23 PROCEDURE — 71046 X-RAY EXAM CHEST 2 VIEWS: CPT

## 2025-06-23 RX ORDER — NITROGLYCERIN 0.4 MG/1
0.4 TABLET SUBLINGUAL ONCE
Status: COMPLETED | OUTPATIENT
Start: 2025-06-23 | End: 2025-06-23

## 2025-06-23 RX ADMIN — NITROGLYCERIN 0.4 MG: 0.4 TABLET, ORALLY DISINTEGRATING SUBLINGUAL at 18:33

## 2025-06-23 ASSESSMENT — LIFESTYLE VARIABLES
HOW OFTEN DO YOU HAVE A DRINK CONTAINING ALCOHOL: NEVER
HOW MANY STANDARD DRINKS CONTAINING ALCOHOL DO YOU HAVE ON A TYPICAL DAY: PATIENT DOES NOT DRINK

## 2025-06-23 ASSESSMENT — PAIN SCALES - GENERAL: PAINLEVEL_OUTOF10: 6

## 2025-06-23 NOTE — ED PROVIDER NOTES
ED Attending  CC: No         Department of Emergency Medicine   ED  Provider Note  Admit Date/RoomTime: 6/23/2025  5:38 PM  ED Room: 19/19          HPI:  6/23/25, Time: 6:17 PM EDT  .       Aishwarya Bonner is a 49 y.o. female presenting to the ED for chest pain, beginning yesterday while sitting at home resting.  The complaint has been intermittent, moderate in severity, and worsened by nothing.  History is provided by the patient in the emergency department today.  States that the pain is in the left chest and describes it as a pushing type of sensation.  Will radiate to the subscapular area.  Reports that these episodes will last about 20 minutes and resolve on their own.  No aggravating or alleviating features to these.  States that she does have a history of an MI in November 2020 for.  This eventually did require bypass surgery at [facility] per her report.  Follows with Dr. Barron for cardiology locally.  Denies any recent travel or sick contacts.  No shortness of breath.  Has been compliant with her medications.  Took some ibuprofen without relief of symptoms.  Patient denies all other symptoms at this time.          Review of Systems:   Pertinent positives and negatives are stated within HPI, all other systems reviewed and are negative.          --------------------------------------------- PAST HISTORY ---------------------------------------------  Past Medical History:  has a past medical history of BRCA1 gene mutation positive, Breast cancer (HCC), Cancer (HCC), Diabetes mellitus (HCC), Headache(784.0), Hyperlipidemia, Hypertension, Kidney stone, Lumbar herniated disc, Migraine, and Pancreatitis.    Past Surgical History:  has a past surgical history that includes Cholecystectomy; Tubal ligation; LEEP; Tonsillectomy; Tibia fracture surgery; Ankle surgery; Breast biopsy; Breast lumpectomy; Hysterectomy; Hip fracture surgery (Left, 12/16/2022); US BREAST BIOPSY W LOC DEVICE 1ST LESION RIGHT (Right,

## 2025-06-24 LAB
EKG ATRIAL RATE: 69 BPM
EKG P AXIS: 45 DEGREES
EKG P-R INTERVAL: 134 MS
EKG Q-T INTERVAL: 398 MS
EKG QRS DURATION: 82 MS
EKG QTC CALCULATION (BAZETT): 426 MS
EKG R AXIS: 59 DEGREES
EKG T AXIS: 69 DEGREES
EKG VENTRICULAR RATE: 69 BPM

## 2025-06-24 PROCEDURE — 93010 ELECTROCARDIOGRAM REPORT: CPT | Performed by: INTERNAL MEDICINE

## 2025-06-24 NOTE — DISCHARGE INSTR - COC
Continuity of Care Form    Patient Name: Aishwarya Bonner   :  1975  MRN:  [id number]    Admit date:  2025  Discharge date:  ***    Code Status Order: Prior   Advance Directives:     Admitting Physician:  No admitting provider for patient encounter.  PCP: Jimmy Jane DO    Discharging Nurse: ***  Discharging Hospital Unit/Room#:   Discharging Unit Phone Number: ***    Emergency Contact:   Extended Emergency Contact Information  Primary Emergency Contact: Eduardo Bonner  Address: [address] [place] [id number] [facility]  Home Phone: [phone]  Mobile Phone: [phone]  Relation: Spouse   needed? No  Secondary Emergency Contact: dmitriy hoang  Home Phone: [phone]  Mobile Phone: [phone]  Relation: Parent   needed? No    Past Surgical History:  Past Surgical History:   Procedure Laterality Date    ANKLE SURGERY      BREAST BIOPSY      BREAST LUMPECTOMY      CHOLECYSTECTOMY      HIP FRACTURE SURGERY Left 2022    LEFT HIP OPEN REDUCTION INTERNAL FIXATION (SHARATH TABLE) performed by Bob Wheatley DO at [facility] OR    HYSTERECTOMY (CERVIX STATUS UNKNOWN)      LEE      4 PROCEDURES    TIBIA FRACTURE SURGERY      TONSILLECTOMY      TUBAL LIGATION      US BREAST BIOPSY W LOC DEVICE 1ST LESION LEFT Left 2023    US BREAST BIOPSY W LOC DEVICE 1ST LESION LEFT 2023 [facility] ULTRASOUND    US BREAST BIOPSY W LOC DEVICE 1ST LESION RIGHT Right 2023    US BREAST BIOPSY W LOC DEVICE 1ST LESION RIGHT 2023 [facility] ULTRASOUND       Immunization History:   Immunization History   Administered Date(s) Administered    COVID-19, MODERNA BLUE border, Primary or Immunocompromised, (age 12y+), IM, 100 mcg/0.5mL 10/07/2021, 2021    Hep A, HAVRIX, VAQTA, (age 19y+), IM, 1mL 2021    Influenza 2010    Influenza Virus Vaccine 10/07/2015, 2020    Influenza, FLUARIX, FLULAVAL, FLUZONE (age 6 mo+) and AFLURIA, (age

## 2025-07-30 ENCOUNTER — HOSPITAL ENCOUNTER (EMERGENCY)
Age: 50
Discharge: HOME | End: 2025-07-30
Attending: EMERGENCY MEDICINE
Payer: COMMERCIAL

## 2025-07-30 ENCOUNTER — APPOINTMENT (OUTPATIENT)
Dept: GENERAL RADIOLOGY | Age: 50
End: 2025-07-30
Payer: COMMERCIAL

## 2025-07-30 VITALS
SYSTOLIC BLOOD PRESSURE: 143 MMHG | TEMPERATURE: 97.6 F | RESPIRATION RATE: 16 BRPM | WEIGHT: 195 LBS | HEIGHT: 67 IN | BODY MASS INDEX: 30.61 KG/M2 | HEART RATE: 52 BPM | DIASTOLIC BLOOD PRESSURE: 74 MMHG | OXYGEN SATURATION: 98 %

## 2025-07-30 DIAGNOSIS — R07.89 ATYPICAL CHEST PAIN: Primary | ICD-10-CM

## 2025-07-30 LAB
ANION GAP SERPL CALCULATED.3IONS-SCNC: 10 MMOL/L (ref 7–16)
BUN SERPL-MCNC: 15 MG/DL (ref 6–20)
CALCIUM SERPL-MCNC: 8.7 MG/DL (ref 8.6–10)
CHLORIDE SERPL-SCNC: 105 MMOL/L (ref 98–107)
CO2 SERPL-SCNC: 25 MMOL/L (ref 22–29)
CREAT SERPL-MCNC: 0.6 MG/DL (ref 0.5–1)
D-DIMER QUANTITATIVE: <200 NG/ML DDU (ref 0–230)
EKG ATRIAL RATE: 67 BPM
EKG P AXIS: 78 DEGREES
EKG P-R INTERVAL: 136 MS
EKG Q-T INTERVAL: 406 MS
EKG QRS DURATION: 88 MS
EKG QTC CALCULATION (BAZETT): 429 MS
EKG R AXIS: 82 DEGREES
EKG T AXIS: 81 DEGREES
EKG VENTRICULAR RATE: 67 BPM
ERYTHROCYTE [DISTWIDTH] IN BLOOD BY AUTOMATED COUNT: 13.6 % (ref 11.5–15)
GFR, ESTIMATED: >90 ML/MIN/1.73M2
GLUCOSE SERPL-MCNC: 144 MG/DL (ref 74–99)
HCT VFR BLD AUTO: 38.2 % (ref 34–48)
HGB BLD-MCNC: 12.6 G/DL (ref 11.5–15.5)
MCH RBC QN AUTO: 28.8 PG (ref 26–35)
MCHC RBC AUTO-ENTMCNC: 33 G/DL (ref 32–34.5)
MCV RBC AUTO: 87.4 FL (ref 80–99.9)
PLATELET # BLD AUTO: 259 K/UL (ref 130–450)
PMV BLD AUTO: 11.1 FL (ref 7–12)
POTASSIUM SERPL-SCNC: 4.3 MMOL/L (ref 3.5–5.1)
RBC # BLD AUTO: 4.37 M/UL (ref 3.5–5.5)
SODIUM SERPL-SCNC: 140 MMOL/L (ref 136–145)
TROPONIN I SERPL HS-MCNC: 7 NG/L (ref 0–14)
TROPONIN I SERPL HS-MCNC: 9 NG/L (ref 0–14)
WBC OTHER # BLD: 9.1 K/UL (ref 4.5–11.5)

## 2025-07-30 PROCEDURE — 85379 FIBRIN DEGRADATION QUANT: CPT

## 2025-07-30 PROCEDURE — 93010 ELECTROCARDIOGRAM REPORT: CPT | Performed by: INTERNAL MEDICINE

## 2025-07-30 PROCEDURE — 71046 X-RAY EXAM CHEST 2 VIEWS: CPT

## 2025-07-30 PROCEDURE — 99285 EMERGENCY DEPT VISIT HI MDM: CPT

## 2025-07-30 PROCEDURE — 80048 BASIC METABOLIC PNL TOTAL CA: CPT

## 2025-07-30 PROCEDURE — 6370000000 HC RX 637 (ALT 250 FOR IP): Performed by: EMERGENCY MEDICINE

## 2025-07-30 PROCEDURE — 84484 ASSAY OF TROPONIN QUANT: CPT

## 2025-07-30 PROCEDURE — 85027 COMPLETE CBC AUTOMATED: CPT

## 2025-07-30 PROCEDURE — 93005 ELECTROCARDIOGRAM TRACING: CPT | Performed by: EMERGENCY MEDICINE

## 2025-07-30 RX ORDER — ASPIRIN 81 MG/1
324 TABLET, CHEWABLE ORAL ONCE
Status: COMPLETED | OUTPATIENT
Start: 2025-07-30 | End: 2025-07-30

## 2025-07-30 RX ADMIN — ASPIRIN 324 MG: 81 TABLET, CHEWABLE ORAL at 07:34

## 2025-07-30 ASSESSMENT — PAIN DESCRIPTION - DESCRIPTORS: DESCRIPTORS: TIGHTNESS

## 2025-07-30 ASSESSMENT — PAIN DESCRIPTION - LOCATION: LOCATION: CHEST

## 2025-07-30 ASSESSMENT — LIFESTYLE VARIABLES
HOW MANY STANDARD DRINKS CONTAINING ALCOHOL DO YOU HAVE ON A TYPICAL DAY: PATIENT DOES NOT DRINK
HOW OFTEN DO YOU HAVE A DRINK CONTAINING ALCOHOL: NEVER

## 2025-07-30 ASSESSMENT — PAIN - FUNCTIONAL ASSESSMENT: PAIN_FUNCTIONAL_ASSESSMENT: 0-10

## 2025-07-30 ASSESSMENT — PAIN SCALES - GENERAL: PAINLEVEL_OUTOF10: 5

## 2025-07-30 ASSESSMENT — ENCOUNTER SYMPTOMS
ABDOMINAL PAIN: 0
SHORTNESS OF BREATH: 0

## 2025-07-30 ASSESSMENT — PAIN DESCRIPTION - ORIENTATION: ORIENTATION: MID

## 2025-07-30 NOTE — ED PROVIDER NOTES
Patient presents emergency department with complaint of chest pain.  Patient states that it was there when she woke this morning but it did not wake her up.  She states it feels more like she is coming down with a cold or is getting sick.  She does have significant heart history and has had CABG within the past 12 months.  She states she faithfully takes her medication but is not due for her meds until 9 AM.  She denies a history of a PE.  She states no peripheral swelling or tenderness.  No nausea, lightheadedness or diaphoresis.    The history is provided by the patient.       Review of Systems   Constitutional: Negative.  Negative for activity change, appetite change, diaphoresis and fatigue.   Respiratory:  Negative for shortness of breath.    Cardiovascular:  Positive for chest pain. Negative for palpitations and leg swelling.   Gastrointestinal:  Negative for abdominal pain.   Genitourinary: Negative.    Musculoskeletal: Negative.    Skin: Negative.    Neurological:  Negative for light-headedness.       Physical Exam  Vitals and nursing note reviewed.   Constitutional:       General: She is not in acute distress.     Appearance: Normal appearance. She is well-developed. She is obese. She is not ill-appearing or toxic-appearing.   HENT:      Head: Normocephalic and atraumatic.      Nose: Nose normal.      Mouth/Throat:      Mouth: Mucous membranes are moist.      Pharynx: Oropharynx is clear.   Eyes:      Extraocular Movements: Extraocular movements intact.      Conjunctiva/sclera: Conjunctivae normal.      Pupils: Pupils are equal, round, and reactive to light.   Cardiovascular:      Rate and Rhythm: Normal rate and regular rhythm.      Pulses: Normal pulses.      Heart sounds: Normal heart sounds. No murmur heard.  Pulmonary:      Effort: Pulmonary effort is normal. No respiratory distress.      Breath sounds: Normal breath sounds. No wheezing or rales.   Abdominal:      General: Bowel sounds are normal.

## 2025-07-30 NOTE — DISCHARGE INSTRUCTIONS
If you worsening chest discomfort, shortness of breath, lightheadedness or other concerning symptoms please return to the emergency department immediately.

## 2025-07-31 LAB
EKG ATRIAL RATE: 53 BPM
EKG P AXIS: 107 DEGREES
EKG P-R INTERVAL: 140 MS
EKG Q-T INTERVAL: 434 MS
EKG QRS DURATION: 88 MS
EKG QTC CALCULATION (BAZETT): 407 MS
EKG R AXIS: 104 DEGREES
EKG T AXIS: 94 DEGREES
EKG VENTRICULAR RATE: 53 BPM

## 2025-07-31 PROCEDURE — 93010 ELECTROCARDIOGRAM REPORT: CPT | Performed by: INTERNAL MEDICINE

## 2025-08-07 ENCOUNTER — HOSPITAL ENCOUNTER (EMERGENCY)
Age: 50
Discharge: HOME | End: 2025-08-07
Attending: FAMILY MEDICINE
Payer: COMMERCIAL

## 2025-08-07 VITALS
TEMPERATURE: 97.6 F | DIASTOLIC BLOOD PRESSURE: 84 MMHG | HEART RATE: 60 BPM | OXYGEN SATURATION: 100 % | SYSTOLIC BLOOD PRESSURE: 136 MMHG | RESPIRATION RATE: 18 BRPM

## 2025-08-07 DIAGNOSIS — R35.0 URINARY FREQUENCY: Primary | ICD-10-CM

## 2025-08-07 LAB
BACTERIA URNS QL MICRO: ABNORMAL
BILIRUB UR QL STRIP: NEGATIVE
CLARITY UR: CLEAR
COLOR UR: YELLOW
EPI CELLS #/AREA URNS HPF: ABNORMAL /HPF
GLUCOSE UR STRIP-MCNC: NEGATIVE MG/DL
HGB UR QL STRIP.AUTO: NEGATIVE
KETONES UR STRIP-MCNC: NEGATIVE MG/DL
LEUKOCYTE ESTERASE UR QL STRIP: NEGATIVE
NITRITE UR QL STRIP: NEGATIVE
PH UR STRIP: 5.5 [PH] (ref 5–8)
PROT UR STRIP-MCNC: NEGATIVE MG/DL
RBC #/AREA URNS HPF: ABNORMAL /HPF
SP GR UR STRIP: 1.01 (ref 1–1.03)
UROBILINOGEN UR STRIP-ACNC: 0.2 EU/DL (ref 0–1)
WBC #/AREA URNS HPF: ABNORMAL /HPF

## 2025-08-07 PROCEDURE — 99283 EMERGENCY DEPT VISIT LOW MDM: CPT

## 2025-08-07 PROCEDURE — 81001 URINALYSIS AUTO W/SCOPE: CPT

## 2025-08-07 RX ORDER — NITROFURANTOIN 25; 75 MG/1; MG/1
100 CAPSULE ORAL 2 TIMES DAILY
Qty: 20 CAPSULE | Refills: 0 | Status: SHIPPED | OUTPATIENT
Start: 2025-08-07 | End: 2025-08-17

## 2025-08-07 RX ORDER — PANTOPRAZOLE SODIUM 40 MG/1
40 TABLET, DELAYED RELEASE ORAL DAILY
COMMUNITY

## 2025-08-07 RX ORDER — PHENAZOPYRIDINE HYDROCHLORIDE 100 MG/1
100 TABLET, FILM COATED ORAL 3 TIMES DAILY PRN
Qty: 10 TABLET | Refills: 0 | Status: SHIPPED | OUTPATIENT
Start: 2025-08-07 | End: 2025-08-10

## 2025-08-07 RX ORDER — CLOPIDOGREL BISULFATE 75 MG/1
75 TABLET ORAL DAILY
COMMUNITY

## 2025-08-07 RX ORDER — LOSARTAN POTASSIUM 25 MG/1
25 TABLET ORAL DAILY
COMMUNITY

## 2025-08-07 RX ORDER — METOPROLOL SUCCINATE 25 MG/1
25 TABLET, EXTENDED RELEASE ORAL DAILY
COMMUNITY

## 2025-08-07 RX ORDER — ATORVASTATIN CALCIUM 40 MG/1
80 TABLET, FILM COATED ORAL DAILY
COMMUNITY
Start: 2025-07-11

## 2025-08-07 RX ORDER — INSULIN ASPART 100 [IU]/ML
INJECTION, SOLUTION INTRAVENOUS; SUBCUTANEOUS
COMMUNITY
Start: 2025-06-19

## 2025-08-07 ASSESSMENT — PAIN SCALES - GENERAL: PAINLEVEL_OUTOF10: 3

## 2025-08-07 ASSESSMENT — PAIN DESCRIPTION - DESCRIPTORS: DESCRIPTORS: ACHING

## 2025-08-07 ASSESSMENT — PAIN - FUNCTIONAL ASSESSMENT: PAIN_FUNCTIONAL_ASSESSMENT: 0-10

## (undated) DEVICE — NDL CNTR 40CT FM MAG: Brand: MEDLINE INDUSTRIES, INC.

## (undated) DEVICE — BIT DRL L500MM DIA6X9MM CANN STP L QUIK CPL FOR DH DC TFN

## (undated) DEVICE — AGENT HEMSTAT 3GM PURIFIED PLNT STARCH PWD ABSRB ARISTA AH

## (undated) DEVICE — TOWEL,OR,DSP,ST,BLUE,STD,6/PK,12PK/CS: Brand: MEDLINE

## (undated) DEVICE — PAD,ABDOMINAL,8"X10",ST,LF: Brand: MEDLINE

## (undated) DEVICE — 6617 IOBAN II PATIENT ISOLATION DRAPE 5/BX,4BX/CS: Brand: STERI-DRAPE™ IOBAN™ 2

## (undated) DEVICE — MEDI-VAC YANKAUER SUCTION HANDLE: Brand: CARDINAL HEALTH

## (undated) DEVICE — GOWN,SIRUS,POLYRNF,BRTHSLV,XLN/XL,20/CS: Brand: MEDLINE

## (undated) DEVICE — PACK,BASIC I: Brand: MEDLINE

## (undated) DEVICE — APPLIER LIG CLP M L11IN TI STR RNG HNDL FOR 20 CLP DISP

## (undated) DEVICE — DRAPE 64X41IN RADIOLOGY C ARM EQUIP STER

## (undated) DEVICE — SPONGE,LAP,12"X12",XR,ST,5/PK,40PK/CS: Brand: MEDLINE

## (undated) DEVICE — MARKER,SKIN,WI/RULER AND LABELS: Brand: MEDLINE

## (undated) DEVICE — BANDAGE COMPR W6INXL5YD SELF ADH COHESIVE CO FLX

## (undated) DEVICE — ELECTRODE PT RET AD L9FT HI MOIST COND ADH HYDRGEL CORDED

## (undated) DEVICE — Device

## (undated) DEVICE — SOLUTION IV IRRIG 0.9% SODIUM CHL PLASTIC 2F7125

## (undated) DEVICE — BIT DRL L L266MM DIA16MM FEM FLX CANN QUIK CPL

## (undated) DEVICE — APPLICATOR MEDICATED 26 CC SOLUTION HI LT ORNG CHLORAPREP

## (undated) DEVICE — INTENDED FOR TISSUE SEPARATION, AND OTHER PROCEDURES THAT REQUIRE A SHARP SURGICAL BLADE TO PUNCTURE OR CUT.: Brand: BARD-PARKER ® STAINLESS STEEL BLADES

## (undated) DEVICE — GLOVE ORTHO 8   MSG9480

## (undated) DEVICE — CLOTH SURG PREP PREOPERATIVE CHLORHEXIDINE GLUC 2% READYPREP

## (undated) DEVICE — DRESSING PETRO W3XL8IN N ADH OIL EMUL GZ CURAD

## (undated) DEVICE — HYPODERMIC SAFETY NEEDLE: Brand: MAGELLAN

## (undated) DEVICE — GAUZE,SPONGE,4"X4",16PLY,STRL,LF,10/TRAY: Brand: MEDLINE

## (undated) DEVICE — GOWN,SIRUS,FABRNF,XL,20/CS: Brand: MEDLINE

## (undated) DEVICE — DOUBLE BASIN SET: Brand: MEDLINE INDUSTRIES, INC.

## (undated) DEVICE — SYRINGE IRRIG 60ML SFT PLIABLE BLB EZ TO GRP 1 HND USE W/

## (undated) DEVICE — PENCIL ES L3M BTTN SWCH HOLSTER W/ BLDE ELECTRD EDGE

## (undated) DEVICE — TUBING, SUCTION, 1/4" X 10', STRAIGHT: Brand: MEDLINE

## (undated) DEVICE — BIT DRL L330MM DIA4.2MM CALIB 100MM 3 FLUT QUIK CPL

## (undated) DEVICE — GUIDEWIRE ORTH L400MM DIA3.2MM FOR TFN

## (undated) DEVICE — YANKAUER,BULB TIP,W/O VENT,RIGID,STERILE: Brand: MEDLINE

## (undated) DEVICE — COVER,LIGHT HANDLE,FLX,1/PK: Brand: MEDLINE INDUSTRIES, INC.